# Patient Record
Sex: MALE | Race: WHITE | Employment: OTHER | URBAN - METROPOLITAN AREA
[De-identification: names, ages, dates, MRNs, and addresses within clinical notes are randomized per-mention and may not be internally consistent; named-entity substitution may affect disease eponyms.]

---

## 2016-10-15 LAB
ALBUMIN SERPL-MCNC: 4.5 G/DL
ALP BLD-CCNC: 69 U/L
ALT SERPL-CCNC: 39 U/L
ANION GAP SERPL CALCULATED.3IONS-SCNC: NORMAL MMOL/L
AST SERPL-CCNC: 26 U/L
BASOPHILS ABSOLUTE: NORMAL /ΜL
BASOPHILS RELATIVE PERCENT: NORMAL %
BILIRUB SERPL-MCNC: 0.7 MG/DL (ref 0.1–1.4)
BUN BLDV-MCNC: 17 MG/DL
CALCIUM SERPL-MCNC: 10 MG/DL
CHLORIDE BLD-SCNC: 106 MMOL/L
CHOLESTEROL, TOTAL: 112 MG/DL
CHOLESTEROL/HDL RATIO: NORMAL
CO2: 28 MMOL/L
CREAT SERPL-MCNC: 0.9 MG/DL
EOSINOPHILS ABSOLUTE: NORMAL /ΜL
EOSINOPHILS RELATIVE PERCENT: NORMAL %
GFR CALCULATED: 89
GLUCOSE BLD-MCNC: 94 MG/DL
HCT VFR BLD CALC: 48.6 % (ref 41–53)
HDLC SERPL-MCNC: 44 MG/DL (ref 35–70)
HEMOGLOBIN: 16.5 G/DL (ref 13.5–17.5)
LDL CHOLESTEROL CALCULATED: 51 MG/DL (ref 0–160)
LYMPHOCYTES ABSOLUTE: NORMAL /ΜL
LYMPHOCYTES RELATIVE PERCENT: NORMAL %
MCH RBC QN AUTO: NORMAL PG
MCHC RBC AUTO-ENTMCNC: NORMAL G/DL
MCV RBC AUTO: NORMAL FL
MONOCYTES ABSOLUTE: NORMAL /ΜL
MONOCYTES RELATIVE PERCENT: NORMAL %
NEUTROPHILS ABSOLUTE: NORMAL /ΜL
NEUTROPHILS RELATIVE PERCENT: NORMAL %
PLATELET # BLD: 173 K/ΜL
PMV BLD AUTO: NORMAL FL
POTASSIUM SERPL-SCNC: 4.3 MMOL/L
RBC # BLD: 5.58 10^6/ΜL
SODIUM BLD-SCNC: 144 MMOL/L
TOTAL PROTEIN: 7.2
TRIGL SERPL-MCNC: 84 MG/DL
TSH SERPL DL<=0.05 MIU/L-ACNC: 2.3 UIU/ML
VLDLC SERPL CALC-MCNC: NORMAL MG/DL
WBC # BLD: 5.2 10^3/ML

## 2017-01-17 RX ORDER — EZETIMIBE 10 MG/1
10 TABLET ORAL DAILY
Qty: 30 TABLET | Refills: 11 | Status: SHIPPED | OUTPATIENT
Start: 2017-01-17 | End: 2017-10-27 | Stop reason: SDUPTHER

## 2017-01-23 ENCOUNTER — HOSPITAL ENCOUNTER (OUTPATIENT)
Dept: GENERAL RADIOLOGY | Age: 67
Discharge: HOME | End: 2017-01-23
Attending: SPECIALIST | Admitting: SPECIALIST

## 2017-01-23 LAB — HBV SURFACE AB TITR SER: <3.5 {TITER}

## 2017-01-31 ENCOUNTER — HOSPITAL ENCOUNTER (OUTPATIENT)
Dept: GENERAL RADIOLOGY | Age: 67
Discharge: HOME | End: 2017-01-31
Attending: SPECIALIST | Admitting: SPECIALIST

## 2017-02-03 LAB
HBV DNA ULTRA QNT INTERP REALT: NOT DETECTED
HBV DNA ULTRA QNT REALTIME PCR: <1.3
HBV DNA ULTRA QNT REALTIME PCR: <20

## 2017-03-14 ENCOUNTER — HOSPITAL ENCOUNTER (OUTPATIENT)
Dept: SPEECH THERAPY | Age: 67
Discharge: HOME | End: 2017-03-31
Attending: OTOLARYNGOLOGY | Admitting: OTOLARYNGOLOGY

## 2017-04-01 ENCOUNTER — HOSPITAL ENCOUNTER (OUTPATIENT)
Dept: OTHER | Age: 67
Discharge: HOME | End: 2017-04-30
Attending: OTOLARYNGOLOGY | Admitting: OTOLARYNGOLOGY

## 2017-05-09 ENCOUNTER — OFFICE VISIT (OUTPATIENT)
Dept: CARDIOLOGY CLINIC | Age: 67
End: 2017-05-09

## 2017-05-09 VITALS
HEIGHT: 68 IN | BODY MASS INDEX: 30.25 KG/M2 | WEIGHT: 199.6 LBS | DIASTOLIC BLOOD PRESSURE: 68 MMHG | SYSTOLIC BLOOD PRESSURE: 126 MMHG | HEART RATE: 72 BPM

## 2017-05-09 DIAGNOSIS — I25.10 CORONARY ARTERY DISEASE INVOLVING NATIVE CORONARY ARTERY OF NATIVE HEART WITHOUT ANGINA PECTORIS: Primary | ICD-10-CM

## 2017-05-09 DIAGNOSIS — I10 ESSENTIAL HYPERTENSION: ICD-10-CM

## 2017-05-09 DIAGNOSIS — Z82.49 FAMILY HISTORY OF CORONARY ARTERY DISEASE: ICD-10-CM

## 2017-05-09 DIAGNOSIS — K21.9 GASTROESOPHAGEAL REFLUX DISEASE WITHOUT ESOPHAGITIS: ICD-10-CM

## 2017-05-09 DIAGNOSIS — E78.5 HYPERLIPIDEMIA, UNSPECIFIED HYPERLIPIDEMIA TYPE: ICD-10-CM

## 2017-05-09 DIAGNOSIS — Z95.1 S/P CABG X 1: ICD-10-CM

## 2017-05-09 PROCEDURE — G8598 ASA/ANTIPLAT THER USED: HCPCS | Performed by: INTERNAL MEDICINE

## 2017-05-09 PROCEDURE — 3017F COLORECTAL CA SCREEN DOC REV: CPT | Performed by: INTERNAL MEDICINE

## 2017-05-09 PROCEDURE — 1123F ACP DISCUSS/DSCN MKR DOCD: CPT | Performed by: INTERNAL MEDICINE

## 2017-05-09 PROCEDURE — 1036F TOBACCO NON-USER: CPT | Performed by: INTERNAL MEDICINE

## 2017-05-09 PROCEDURE — 99213 OFFICE O/P EST LOW 20 MIN: CPT | Performed by: INTERNAL MEDICINE

## 2017-05-09 PROCEDURE — G8427 DOCREV CUR MEDS BY ELIG CLIN: HCPCS | Performed by: INTERNAL MEDICINE

## 2017-05-09 PROCEDURE — G8419 CALC BMI OUT NRM PARAM NOF/U: HCPCS | Performed by: INTERNAL MEDICINE

## 2017-05-09 PROCEDURE — 4040F PNEUMOC VAC/ADMIN/RCVD: CPT | Performed by: INTERNAL MEDICINE

## 2017-05-09 RX ORDER — ATORVASTATIN CALCIUM 40 MG/1
40 TABLET, FILM COATED ORAL DAILY
Qty: 30 TABLET | Refills: 11 | Status: SHIPPED | OUTPATIENT
Start: 2017-05-09 | End: 2017-06-29 | Stop reason: SDUPTHER

## 2017-05-09 RX ORDER — FERROUS SULFATE 325(65) MG
325 TABLET ORAL
COMMUNITY
End: 2019-03-14

## 2017-05-09 RX ORDER — AMLODIPINE BESYLATE 5 MG/1
5 TABLET ORAL DAILY
Qty: 30 TABLET | Refills: 11 | Status: SHIPPED | OUTPATIENT
Start: 2017-05-09 | End: 2019-01-23

## 2017-06-29 RX ORDER — ATORVASTATIN CALCIUM 40 MG/1
TABLET, FILM COATED ORAL
Qty: 30 TABLET | Refills: 5 | Status: SHIPPED | OUTPATIENT
Start: 2017-06-29 | End: 2018-01-09 | Stop reason: SDUPTHER

## 2017-10-27 ENCOUNTER — OFFICE VISIT (OUTPATIENT)
Dept: CARDIOLOGY CLINIC | Age: 67
End: 2017-10-27

## 2017-10-27 VITALS — WEIGHT: 191.8 LBS | BODY MASS INDEX: 29.07 KG/M2 | HEIGHT: 68 IN

## 2017-10-27 DIAGNOSIS — E78.5 HYPERLIPIDEMIA, UNSPECIFIED HYPERLIPIDEMIA TYPE: ICD-10-CM

## 2017-10-27 DIAGNOSIS — Z82.49 FAMILY HISTORY OF CORONARY ARTERY DISEASE: ICD-10-CM

## 2017-10-27 DIAGNOSIS — I10 ESSENTIAL HYPERTENSION: ICD-10-CM

## 2017-10-27 DIAGNOSIS — I25.10 CORONARY ARTERY DISEASE INVOLVING NATIVE CORONARY ARTERY OF NATIVE HEART WITHOUT ANGINA PECTORIS: Primary | ICD-10-CM

## 2017-10-27 DIAGNOSIS — K21.9 GASTROESOPHAGEAL REFLUX DISEASE WITHOUT ESOPHAGITIS: ICD-10-CM

## 2017-10-27 DIAGNOSIS — Z95.1 S/P CABG X 1: ICD-10-CM

## 2017-10-27 PROCEDURE — G8427 DOCREV CUR MEDS BY ELIG CLIN: HCPCS | Performed by: INTERNAL MEDICINE

## 2017-10-27 PROCEDURE — G8417 CALC BMI ABV UP PARAM F/U: HCPCS | Performed by: INTERNAL MEDICINE

## 2017-10-27 PROCEDURE — G8484 FLU IMMUNIZE NO ADMIN: HCPCS | Performed by: INTERNAL MEDICINE

## 2017-10-27 PROCEDURE — 3017F COLORECTAL CA SCREEN DOC REV: CPT | Performed by: INTERNAL MEDICINE

## 2017-10-27 PROCEDURE — 1123F ACP DISCUSS/DSCN MKR DOCD: CPT | Performed by: INTERNAL MEDICINE

## 2017-10-27 PROCEDURE — 1036F TOBACCO NON-USER: CPT | Performed by: INTERNAL MEDICINE

## 2017-10-27 PROCEDURE — 99214 OFFICE O/P EST MOD 30 MIN: CPT | Performed by: INTERNAL MEDICINE

## 2017-10-27 PROCEDURE — 4040F PNEUMOC VAC/ADMIN/RCVD: CPT | Performed by: INTERNAL MEDICINE

## 2017-10-27 PROCEDURE — G8598 ASA/ANTIPLAT THER USED: HCPCS | Performed by: INTERNAL MEDICINE

## 2017-10-27 RX ORDER — EZETIMIBE 10 MG/1
10 TABLET ORAL DAILY
Qty: 30 TABLET | Refills: 6 | Status: SHIPPED | OUTPATIENT
Start: 2017-10-27 | End: 2018-07-28 | Stop reason: SDUPTHER

## 2017-10-27 NOTE — PATIENT INSTRUCTIONS
CAD:Yes   clinically stable. Patient is on optimal medical regimen ( see medication list above )  -     CORONARY ARTERY DISEASE: Patient is currently  asymptomatic from CAD. - changes in  treatment:   no           - Testing ordered:  no  [facility] [place] [facility] classification: 1  [place] RISK SCORE:  ZEKE RISK SCORE:  HTN:well controlled on current medical regimen, see list above. - changes in  treatment:   no      VHD: No significant VHD noted  DYSLIPIDEMIA: Patient's profile is at / near Goal.yes,                                 HDL is low                                Tolerating current medical regimen wellyes,                                  See most recent Lab values in Labs section above. OTHER RELEVANT DIAGNOSIS:as noted in patient's active problem list: recent Syncope ? Vaso Vagal.  TESTS ORDERED:  Shun Charles. All previously ordered tests reviewed. ARRHYTHMIAS: None known   MEDICATIONS: CPM.Patient to see [id number] [place] f/u in three months. Primary/secondary prevention is the goal by aggressive risk modification, healthy and therapeutic life style changes for cardiovascular risk reduction. Various goals are discussed and multiple questions answered.

## 2017-10-27 NOTE — PROGRESS NOTES
Jana Lyons is a 78 y.o. male who has    CHIEF COMPLAINT AS FOLLOWS:  CHEST PAIN: Patient denies any C/O chest pains at this time. SOB: No C/O SOB at this time. LEG EDEMA: No leg edema   PALPITATIONS: Denies any C/O Palpitations                                   DIZZINESS: No C/O Dizziness                         SYNCOPE:Reportedly passed out on September 17 th, while lifting a [employer]. OTHER:                                     HPI: Patient is here for F/U on his CAD, HTN & Dyslipidemia problems. He does not have any complaints at this time. Hima Weller has the following history recorded in care path:  Patient Active Problem List    Diagnosis Date Noted    Iron deficiency anemia due to chronic blood loss 03/23/2016     Priority: Low    Family history of coronary artery disease      Priority: Low    HTN (hypertension)      Priority: Low    CAD (coronary artery disease)      Priority: Low    Hyperlipidemia      Priority: Low    GERD (gastroesophageal reflux disease)      Priority: Low    S/P CABG x 1      Priority: Low     Current Outpatient Prescriptions   Medication Sig Dispense Refill    atorvastatin (LIPITOR) 40 MG tablet TAKE 1 TABLET BY MOUTH ONE TIME A DAY  30 tablet 5    ferrous sulfate 325 (65 FE) MG tablet Take 325 mg by mouth daily (with breakfast)      amLODIPine (NORVASC) 5 MG tablet Take 1 tablet by mouth daily 30 tablet 11    ezetimibe (ZETIA) 10 MG tablet Take 1 tablet by mouth daily 30 tablet 11    omeprazole (PRILOSEC) 20 MG capsule Take 20 mg by mouth daily.  therapeutic multivitamin-minerals (THERAGRAN-M) tablet Take 1 tablet by mouth daily.  aspirin 81 MG EC tablet Take 81 mg by mouth daily. No current facility-administered medications for this visit. Allergies: Review of patient's allergies indicates no known allergies.   Past Medical History: Negative for headaches, nasal congestion, sinus pain or vertigo  Eyes: Negative for visual disturbance. Endocrine: Negative for polydipsia/polyuria  Respiratory: Negative for shortness of breath  Cardiovascular: Negative for chest pain, dyspnea on exertion, claudication, edema, irregular heartbeat, murmur, palpitations or shortness of breath  Gastrointestinal: Negative for abdominal pain or heartburn  Genito-Urinary: Negative for urinary frequency/urgency  Musculoskeletal: Negative for muscle pain, muscular weakness, negative for pain in arm and leg or swelling in foot and leg  Neurological: Negative for dizziness, headaches, memory loss, numbness/tingling, visual changes, syncope  Dermatological: Negative for rash    Objective:  Ht 5' 8\" (1.727 m)   Wt 191 lb 12.8 oz (87 kg)   BMI 29.16 kg/m²   Wt Readings from Last 3 Encounters:   10/27/17 191 lb 12.8 oz (87 kg)   05/09/17 199 lb 9.6 oz (90.5 kg)   11/16/16 189 lb (85.7 kg)     Body mass index is 29.16 kg/m². GENERAL - Alert, oriented, pleasant, in no apparent distress. EYES: No jaundice, no conjunctival pallor. SKIN: It is warm & dry. No rashes. No Echhymosis    HEENT  No clinically significant abnormalities seen. Neck - Supple. No jugular venous distention noted. No carotid bruits. Cardiovascular  Normal S1 and S2 without obvious murmur or gallop. Extremities - No cyanosis, clubbing, or significant edema. Pulmonary  No respiratory distress. No wheezes or rales. Abdomen  No masses, tenderness, or organomegaly. Musculoskeletal  No significant edema. No joint deformities. No muscle wasting. Neurologic  Cranial nerves II through XII are grossly intact. There were no gross focal neurologic abnormalities.     Lab Review   No results found for: CKTOTAL, CKMB, CKMBINDEX, TROPONINI  BNP:  No results found for: BNP  PT/INR:  No results found for: INR  No results found for: LABA1C  Lab Results   Component Value Date    WBC 5.8 11/06/2014    HCT 40.3 (A) 11/06/2014     11/06/2014     Lab Results   Component Value Date    CHOL 125 10/06/2014    TRIG 105 10/06/2014    HDL 45 10/06/2014    LDLCALC 59 10/06/2014    LDLDIRECT 67 02/08/2012     Lab Results   Component Value Date    ALT 39 10/06/2014    AST 24 10/06/2014     BMP:    Lab Results   Component Value Date     10/06/2014    K 4.1 10/06/2014     10/06/2014    CO2 22 10/06/2014    BUN 14 10/06/2014    CREATININE 0.9 10/06/2014     CMP:   Lab Results   Component Value Date     10/06/2014    K 4.1 10/06/2014     10/06/2014    CO2 22 10/06/2014    BUN 14 10/06/2014    PROT 7.0 02/08/2012     TSH:    Lab Results   Component Value Date    TSH 2.4 08/16/2013       QUALITY MEASURES REVIEWED:  1.CAD:Patient is taking anti platelet agent:Yes  2. DYSLIPIDEMIA: Patient is on cholesterol lowering medication:Yes  3. Beta-Blocker therapy for CAD, if prior Myocardial Infarction:No  4. Atrial fibrillation & anticoagulation therapy No  5. Discussed weight management strategies. Impression:    1. Coronary artery disease involving native coronary artery of native heart without angina pectoris    2. S/P CABG x 1    3. Hyperlipidemia, unspecified hyperlipidemia type    4. Essential hypertension    5. Gastroesophageal reflux disease without esophagitis    6. Family history of coronary artery disease       Patient Active Problem List   Diagnosis Code    CAD (coronary artery disease) I25.10    Hyperlipidemia E78.5    GERD (gastroesophageal reflux disease) K21.9    S/P CABG x 1 Z95.1    HTN (hypertension) I10    Family history of coronary artery disease Z80.55    Iron deficiency anemia due to chronic blood loss D50.0       Assessment & Plan:    CAD:Yes   clinically stable. Patient is on optimal medical regimen ( see medication list above )  -     CORONARY ARTERY DISEASE: Patient is currently  asymptomatic from CAD.             - changes in  treatment:   no           - Testing ordered: no  [place] classification: 1  [place] RISK SCORE:  ZEKE RISK SCORE:  HTN:well controlled on current medical regimen, see list above. - changes in  treatment:   no      VHD: No significant VHD noted  DYSLIPIDEMIA: Patient's profile is at / near Goal.yes,                                 HDL is low                                Tolerating current medical regimen wellyes,                                  See most recent Lab values in Labs section above. OTHER RELEVANT DIAGNOSIS:as noted in patient's active problem list: recent Syncope ? Vaso Vagal.  TESTS ORDERED:  Clementina Hoskins. All previously ordered tests reviewed. ARRHYTHMIAS: None known   MEDICATIONS: CPM.Patient to see [id number] [place] f/u in three months. Primary/secondary prevention is the goal by aggressive risk modification, healthy and therapeutic life style changes for cardiovascular risk reduction. Various goals are discussed and multiple questions answered.

## 2017-10-31 ENCOUNTER — PROCEDURE VISIT (OUTPATIENT)
Dept: CARDIOLOGY CLINIC | Age: 67
End: 2017-10-31

## 2017-10-31 DIAGNOSIS — I25.10 CORONARY ARTERY DISEASE INVOLVING NATIVE CORONARY ARTERY OF NATIVE HEART WITHOUT ANGINA PECTORIS: ICD-10-CM

## 2017-10-31 DIAGNOSIS — K21.9 GASTROESOPHAGEAL REFLUX DISEASE WITHOUT ESOPHAGITIS: ICD-10-CM

## 2017-10-31 DIAGNOSIS — Z82.49 FAMILY HISTORY OF CORONARY ARTERY DISEASE: ICD-10-CM

## 2017-10-31 DIAGNOSIS — Z95.1 S/P CABG X 1: ICD-10-CM

## 2017-10-31 DIAGNOSIS — I10 ESSENTIAL HYPERTENSION: ICD-10-CM

## 2017-10-31 DIAGNOSIS — R55 SYNCOPE AND COLLAPSE: Primary | ICD-10-CM

## 2017-10-31 DIAGNOSIS — E78.5 HYPERLIPIDEMIA, UNSPECIFIED HYPERLIPIDEMIA TYPE: ICD-10-CM

## 2017-10-31 LAB
LV EF: 65 %
LVEF MODALITY: NORMAL

## 2017-10-31 PROCEDURE — 93018 CV STRESS TEST I&R ONLY: CPT | Performed by: INTERNAL MEDICINE

## 2017-10-31 PROCEDURE — 78452 HT MUSCLE IMAGE SPECT MULT: CPT | Performed by: INTERNAL MEDICINE

## 2017-10-31 PROCEDURE — A9500 TC99M SESTAMIBI: HCPCS | Performed by: INTERNAL MEDICINE

## 2017-10-31 PROCEDURE — 93017 CV STRESS TEST TRACING ONLY: CPT | Performed by: INTERNAL MEDICINE

## 2017-10-31 PROCEDURE — 93016 CV STRESS TEST SUPVJ ONLY: CPT | Performed by: INTERNAL MEDICINE

## 2017-10-31 NOTE — PROGRESS NOTES
11:03 AM    10/31/17    Site: antecubital right, condition patent and no redness    # of attempts: 1

## 2017-11-02 ENCOUNTER — TELEPHONE (OUTPATIENT)
Dept: CARDIOLOGY CLINIC | Age: 67
End: 2017-11-02

## 2017-11-07 ENCOUNTER — TELEPHONE (OUTPATIENT)
Dept: CARDIOLOGY CLINIC | Age: 67
End: 2017-11-07

## 2017-11-17 ENCOUNTER — INITIAL CONSULT (OUTPATIENT)
Dept: CARDIOLOGY CLINIC | Age: 67
End: 2017-11-17

## 2017-11-17 ENCOUNTER — NURSE ONLY (OUTPATIENT)
Dept: CARDIOLOGY CLINIC | Age: 67
End: 2017-11-17

## 2017-11-17 DIAGNOSIS — R55 SYNCOPE, UNSPECIFIED SYNCOPE TYPE: Primary | ICD-10-CM

## 2017-11-17 PROCEDURE — G8417 CALC BMI ABV UP PARAM F/U: HCPCS | Performed by: INTERNAL MEDICINE

## 2017-11-17 PROCEDURE — G8598 ASA/ANTIPLAT THER USED: HCPCS | Performed by: INTERNAL MEDICINE

## 2017-11-17 PROCEDURE — 1123F ACP DISCUSS/DSCN MKR DOCD: CPT | Performed by: INTERNAL MEDICINE

## 2017-11-17 PROCEDURE — 3017F COLORECTAL CA SCREEN DOC REV: CPT | Performed by: INTERNAL MEDICINE

## 2017-11-17 PROCEDURE — 99213 OFFICE O/P EST LOW 20 MIN: CPT | Performed by: INTERNAL MEDICINE

## 2017-11-17 PROCEDURE — 93000 ELECTROCARDIOGRAM COMPLETE: CPT | Performed by: INTERNAL MEDICINE

## 2017-11-17 PROCEDURE — 1036F TOBACCO NON-USER: CPT | Performed by: INTERNAL MEDICINE

## 2017-11-17 PROCEDURE — 4040F PNEUMOC VAC/ADMIN/RCVD: CPT | Performed by: INTERNAL MEDICINE

## 2017-11-17 PROCEDURE — G8484 FLU IMMUNIZE NO ADMIN: HCPCS | Performed by: INTERNAL MEDICINE

## 2017-11-17 PROCEDURE — G8427 DOCREV CUR MEDS BY ELIG CLIN: HCPCS | Performed by: INTERNAL MEDICINE

## 2017-11-17 NOTE — PROGRESS NOTES
Electrophysiology Consult Note      Reason for consultation:  Syncope    Chief complaint : Syncope    Referring physician:  Kristina Naidu      Primary care physician: Sheila Lawton MD      History of Present Illness:     Chief Complaint   Patient presents with    Loss of Consciousness     Mo-syncope/stress test 10/31. Patient denies CP, palpitations, dizziness or edema. Does report SOB but he says this is not unusual for him. He states he has only had the one syncope episode he had9/17/17 and has felt fine since. He has never had any previous episodes of syncope either. Past medical history:   Past Medical History:   Diagnosis Date    CAD (coronary artery disease)     Family history of coronary artery disease     H/O cardiac catheterization 03-    ([facility]) Total occ. LAD w/mild disease of LM and RCA.     H/O cardiovascular stress test 02-    (Exercise) EF 58%. Normal. Exercise cap. 11.0 METS.    H/O cardiovascular stress test 05-    (Cardiolite) EF 63%. Good exercise capacity. Hypertensive blood pressure response tp exercise. ETT neg for ischemia or arrhythmia. Cardiolite perfusion imaging reveals Ant. wall soft tissue attenuation artifact and mild ischemia of Inf. wall.  H/O colonoscopy with polypectomy 02-    Polyp in Rectum    H/O Doppler ultrasound 03-    (Carotid) No anatomical abnormalities. Normal    H/O echocardiogram 02- 6/14 EF 50-55%  abn LV diast stage 1 2/10EF 50-55%. Abn. LV Diastolic function Stage 1. Left Atrium borderline Dilated.  H/O gastritis     Years Ago    History of complete ECG 04-    HTN (hypertension)     Hx of cardiovascular stress test 10/28/2015    cardiolite-mild ischemia apical,EF64%    Hx of echocardiogram 6/9/2016    EF 55-60%. LA is mildly dilated. RV is mildly dilated. Mild MR. Mild tricuspid insufficiency. Significant VHD is absent.      Hyperlipidemia     flank pain and hematuria. Musculoskeletal: Positive for arthralgias. Negative for back pain, myalgias and neck stiffness. Skin: Negative for color change and rash. Allergic/Immunologic: Negative for food allergies. Neurological: Positive for syncope. Negative for numbness and headaches. Hematological: Does not bruise/bleed easily. Psychiatric/Behavioral: Negative for agitation, behavioral problems and confusion. Physical Examination:    BP (!) 140/82   Pulse 60   Temp 98 °F (36.7 °C)   Resp 14   Ht 5' 8\" (1.727 m)   Wt 196 lb 12.8 oz (89.3 kg)   SpO2 98%   BMI 29.92 kg/m²    Wt Readings from Last 3 Encounters:   11/17/17 196 lb 12.8 oz (89.3 kg)   10/27/17 191 lb 12.8 oz (87 kg)   05/09/17 199 lb 9.6 oz (90.5 kg)     Body mass index is 29.92 kg/m². Physical Exam   Constitutional: He is oriented to person, place, and time and well-developed, well-nourished, and in no distress. HENT:   Head: Normocephalic and atraumatic. Eyes: Conjunctivae and EOM are normal. Pupils are equal, round, and reactive to light. Right eye exhibits no discharge. Neck: Normal range of motion. No JVD present. No thyromegaly present. Cardiovascular: Normal rate, regular rhythm and normal heart sounds. Exam reveals no friction rub. No murmur heard. Pulmonary/Chest: Effort normal and breath sounds normal. No stridor. No respiratory distress. He has no wheezes. Abdominal: Soft. Bowel sounds are normal. He exhibits no distension. There is no tenderness. Musculoskeletal: Normal range of motion. He exhibits no edema or tenderness. Neurological: He is alert and oriented to person, place, and time. No cranial nerve deficit. Skin: Skin is warm and dry. No rash noted. No erythema.    Psychiatric: Mood and affect normal.       CBC:   Lab Results   Component Value Date    WBC 5.2 10/14/2016    HGB 16.5 10/14/2016    HCT 48.6 10/14/2016     10/14/2016     Lipids:   Lab Results   Component Value

## 2017-12-03 VITALS
HEART RATE: 60 BPM | BODY MASS INDEX: 29.83 KG/M2 | OXYGEN SATURATION: 98 % | SYSTOLIC BLOOD PRESSURE: 140 MMHG | WEIGHT: 196.8 LBS | DIASTOLIC BLOOD PRESSURE: 82 MMHG | HEIGHT: 68 IN | RESPIRATION RATE: 14 BRPM | TEMPERATURE: 98 F

## 2017-12-03 ASSESSMENT — ENCOUNTER SYMPTOMS
CONSTIPATION: 0
VOMITING: 0
SHORTNESS OF BREATH: 1
BACK PAIN: 0
CHEST TIGHTNESS: 0
DIARRHEA: 0
ABDOMINAL PAIN: 0
COUGH: 0
NAUSEA: 0
WHEEZING: 0
BLOOD IN STOOL: 0
EYE PAIN: 0
PHOTOPHOBIA: 0
COLOR CHANGE: 0

## 2017-12-22 ENCOUNTER — OFFICE VISIT (OUTPATIENT)
Dept: CARDIOLOGY CLINIC | Age: 67
End: 2017-12-22

## 2017-12-22 VITALS
BODY MASS INDEX: 30.31 KG/M2 | HEART RATE: 60 BPM | SYSTOLIC BLOOD PRESSURE: 136 MMHG | HEIGHT: 68 IN | WEIGHT: 200 LBS | DIASTOLIC BLOOD PRESSURE: 82 MMHG

## 2017-12-22 DIAGNOSIS — R55 SYNCOPE, UNSPECIFIED SYNCOPE TYPE: Primary | ICD-10-CM

## 2017-12-22 PROCEDURE — G8427 DOCREV CUR MEDS BY ELIG CLIN: HCPCS | Performed by: INTERNAL MEDICINE

## 2017-12-22 PROCEDURE — G8598 ASA/ANTIPLAT THER USED: HCPCS | Performed by: INTERNAL MEDICINE

## 2017-12-22 PROCEDURE — 93228 REMOTE 30 DAY ECG REV/REPORT: CPT | Performed by: INTERNAL MEDICINE

## 2017-12-22 PROCEDURE — G8484 FLU IMMUNIZE NO ADMIN: HCPCS | Performed by: INTERNAL MEDICINE

## 2017-12-22 PROCEDURE — 99212 OFFICE O/P EST SF 10 MIN: CPT | Performed by: INTERNAL MEDICINE

## 2017-12-22 PROCEDURE — G8417 CALC BMI ABV UP PARAM F/U: HCPCS | Performed by: INTERNAL MEDICINE

## 2017-12-22 PROCEDURE — 3017F COLORECTAL CA SCREEN DOC REV: CPT | Performed by: INTERNAL MEDICINE

## 2017-12-22 PROCEDURE — 1036F TOBACCO NON-USER: CPT | Performed by: INTERNAL MEDICINE

## 2017-12-22 PROCEDURE — 4040F PNEUMOC VAC/ADMIN/RCVD: CPT | Performed by: INTERNAL MEDICINE

## 2017-12-22 PROCEDURE — 1123F ACP DISCUSS/DSCN MKR DOCD: CPT | Performed by: INTERNAL MEDICINE

## 2017-12-22 ASSESSMENT — ENCOUNTER SYMPTOMS
VOMITING: 0
EYE PAIN: 0
WHEEZING: 0
BLOOD IN STOOL: 0
COUGH: 0
NAUSEA: 0
BACK PAIN: 0
SHORTNESS OF BREATH: 1
CHEST TIGHTNESS: 0
ABDOMINAL PAIN: 0
COLOR CHANGE: 0
CONSTIPATION: 0
DIARRHEA: 0
PHOTOPHOBIA: 0

## 2017-12-22 NOTE — PATIENT INSTRUCTIONS
Please remember to bring all medication bottles or a medication list with you to your appointment. If you have any questions, please call our office at [phone].

## 2017-12-22 NOTE — PROGRESS NOTES
Electrophysiology FU Note      Reason for consultation:  Syncope    Chief complaint : Syncope    Referring physician:  Nghia Rodriguez      Primary care physician: Danielle Benitez MD      History of Present Illness: This visit ( 12/22/2017)      Chief Complaint   Patient presents with    Loss of Consciousness     Pt is here for 1 month follow up event monitor. Pt denies chest pain, shortness of breath, dizziness, palpitations, and edema. Previous visit:    Chief Complaint   Patient presents with    Loss of Consciousness     Mo-syncope/stress test 10/31. Patient denies CP, palpitations, dizziness or edema. Does report SOB but he says this is not unusual for him. He states he has only had the one syncope episode he had9/17/17 and has felt fine since. He has never had any previous episodes of syncope either. Past medical history:   Past Medical History:   Diagnosis Date    CAD (coronary artery disease)     Family history of coronary artery disease     H/O cardiac catheterization 03-    ([facility]) Total occ. LAD w/mild disease of LM and RCA.     H/O cardiovascular stress test 02-    (Exercise) EF 58%. Normal. Exercise cap. 11.0 METS.    H/O cardiovascular stress test 05-    (Cardiolite) EF 63%. Good exercise capacity. Hypertensive blood pressure response tp exercise. ETT neg for ischemia or arrhythmia. Cardiolite perfusion imaging reveals Ant. wall soft tissue attenuation artifact and mild ischemia of Inf. wall.  H/O colonoscopy with polypectomy 02-    Polyp in Rectum    H/O Doppler ultrasound 03-    (Carotid) No anatomical abnormalities. Normal    H/O echocardiogram 02- 6/14 EF 50-55%  abn LV diast stage 1 2/10EF 50-55%. Abn. LV Diastolic function Stage 1. Left Atrium borderline Dilated.      H/O gastritis     Years Ago    History of complete ECG 04-    HTN (hypertension)     Hx of for chest pain, palpitations and leg swelling. Gastrointestinal: Negative for abdominal pain, blood in stool, constipation, diarrhea, nausea and vomiting. Endocrine: Negative for cold intolerance and heat intolerance. Genitourinary: Negative for dysuria, flank pain and hematuria. Musculoskeletal: Positive for arthralgias. Negative for back pain, myalgias and neck stiffness. Skin: Negative for color change and rash. Allergic/Immunologic: Negative for food allergies. Neurological: Positive for syncope. Negative for numbness and headaches. Hematological: Does not bruise/bleed easily. Psychiatric/Behavioral: Negative for agitation, behavioral problems and confusion. Physical Examination:    /82   Pulse 60   Ht 5' 8\" (1.727 m)   Wt 200 lb (90.7 kg)   BMI 30.41 kg/m²    Wt Readings from Last 3 Encounters:   12/22/17 200 lb (90.7 kg)   11/17/17 196 lb 12.8 oz (89.3 kg)   10/27/17 191 lb 12.8 oz (87 kg)     Body mass index is 30.41 kg/m². Physical Exam   Constitutional: He is oriented to person, place, and time and well-developed, well-nourished, and in no distress. HENT:   Head: Normocephalic and atraumatic. Eyes: Conjunctivae and EOM are normal. Pupils are equal, round, and reactive to light. Right eye exhibits no discharge. Neck: Normal range of motion. No JVD present. No thyromegaly present. Cardiovascular: Normal rate, regular rhythm and normal heart sounds. Exam reveals no friction rub. No murmur heard. Pulmonary/Chest: Effort normal and breath sounds normal. No stridor. No respiratory distress. He has no wheezes. Abdominal: Soft. Bowel sounds are normal. He exhibits no distension. There is no tenderness. Musculoskeletal: Normal range of motion. He exhibits no edema or tenderness. Neurological: He is alert and oriented to person, place, and time. No cranial nerve deficit. Skin: Skin is warm and dry. No rash noted. No erythema.    Psychiatric: Mood and affect normal.       CBC:   Lab Results   Component Value Date    WBC 5.2 10/14/2016    HGB 16.5 10/14/2016    HCT 48.6 10/14/2016     10/14/2016     Lipids:   Lab Results   Component Value Date    CHOL 112 10/14/2016    TRIG 84 10/14/2016    HDL 44 10/14/2016    LDLCALC 51 10/14/2016    LDLDIRECT 67 02/08/2012     PT/INR: No results found for: INR     BMP:    Lab Results   Component Value Date     10/14/2016    K 4.3 10/14/2016     10/14/2016    CO2 28 10/14/2016    BUN 17 10/14/2016     CMP:   Lab Results   Component Value Date    AST 26 10/14/2016    PROT 7.0 02/08/2012    BILITOT 0.7 10/14/2016    ALKPHOS 69 10/14/2016     TSH:    Lab Results   Component Value Date    TSH 2.3 10/14/2016           IMPRESSION / RECOMMENDATIONS:     Encounter Diagnoses   Name Primary?  Syncope, unspecified syncope type Yes   CAD SP CABG  HTN  HLD      No arrhythmia on event monitor  Patient has no symptoms  FU as needed  If recurs then may need to perform loop recorder        Thanks again for allowing me to participate in care of this patient. Please call me if you have any questions. With best regards.       Miriam Moreno MD, 12/22/2017 8:50 AM

## 2018-01-09 RX ORDER — ATORVASTATIN CALCIUM 40 MG/1
40 TABLET, FILM COATED ORAL DAILY
Qty: 30 TABLET | Refills: 6 | Status: SHIPPED | OUTPATIENT
Start: 2018-01-09 | End: 2018-07-17 | Stop reason: SDUPTHER

## 2018-01-16 ENCOUNTER — OFFICE VISIT (OUTPATIENT)
Dept: CARDIOLOGY CLINIC | Age: 68
End: 2018-01-16

## 2018-01-16 VITALS
SYSTOLIC BLOOD PRESSURE: 126 MMHG | HEART RATE: 68 BPM | BODY MASS INDEX: 30.43 KG/M2 | WEIGHT: 200.8 LBS | DIASTOLIC BLOOD PRESSURE: 70 MMHG | HEIGHT: 68 IN

## 2018-01-16 DIAGNOSIS — Z95.1 S/P CABG X 1: ICD-10-CM

## 2018-01-16 DIAGNOSIS — K21.9 GASTROESOPHAGEAL REFLUX DISEASE WITHOUT ESOPHAGITIS: ICD-10-CM

## 2018-01-16 DIAGNOSIS — E78.5 HYPERLIPIDEMIA, UNSPECIFIED HYPERLIPIDEMIA TYPE: ICD-10-CM

## 2018-01-16 DIAGNOSIS — I25.10 CORONARY ARTERY DISEASE INVOLVING NATIVE CORONARY ARTERY OF NATIVE HEART WITHOUT ANGINA PECTORIS: Primary | ICD-10-CM

## 2018-01-16 DIAGNOSIS — D50.0 IRON DEFICIENCY ANEMIA DUE TO CHRONIC BLOOD LOSS: ICD-10-CM

## 2018-01-16 DIAGNOSIS — Z82.49 FAMILY HISTORY OF CORONARY ARTERY DISEASE: ICD-10-CM

## 2018-01-16 DIAGNOSIS — I10 ESSENTIAL HYPERTENSION: ICD-10-CM

## 2018-01-16 PROCEDURE — G8427 DOCREV CUR MEDS BY ELIG CLIN: HCPCS | Performed by: INTERNAL MEDICINE

## 2018-01-16 PROCEDURE — 1036F TOBACCO NON-USER: CPT | Performed by: INTERNAL MEDICINE

## 2018-01-16 PROCEDURE — 99214 OFFICE O/P EST MOD 30 MIN: CPT | Performed by: INTERNAL MEDICINE

## 2018-01-16 PROCEDURE — G8417 CALC BMI ABV UP PARAM F/U: HCPCS | Performed by: INTERNAL MEDICINE

## 2018-01-16 PROCEDURE — 1123F ACP DISCUSS/DSCN MKR DOCD: CPT | Performed by: INTERNAL MEDICINE

## 2018-01-16 PROCEDURE — G8484 FLU IMMUNIZE NO ADMIN: HCPCS | Performed by: INTERNAL MEDICINE

## 2018-01-16 PROCEDURE — G8598 ASA/ANTIPLAT THER USED: HCPCS | Performed by: INTERNAL MEDICINE

## 2018-01-16 PROCEDURE — 3017F COLORECTAL CA SCREEN DOC REV: CPT | Performed by: INTERNAL MEDICINE

## 2018-01-16 PROCEDURE — 4040F PNEUMOC VAC/ADMIN/RCVD: CPT | Performed by: INTERNAL MEDICINE

## 2018-01-16 NOTE — PROGRESS NOTES
Constitutional: Negative for diaphoresis and fatigue  Psychological:Negative for anxiety or depression  HENT: Negative for headaches, nasal congestion, sinus pain or vertigo  Eyes: Negative for visual disturbance. Endocrine: Negative for polydipsia/polyuria  Respiratory: Negative for shortness of breath  Cardiovascular: Negative for chest pain, dyspnea on exertion, claudication, edema, irregular heartbeat, murmur, palpitations or shortness of breath  Gastrointestinal: Negative for abdominal pain or heartburn  Genito-Urinary: Negative for urinary frequency/urgency  Musculoskeletal: Negative for muscle pain, muscular weakness, negative for pain in arm and leg or swelling in foot and leg  Neurological: Negative for dizziness, headaches, memory loss, numbness/tingling, visual changes, syncope  Dermatological: Negative for rash    Objective:  /70   Pulse 68   Ht 5' 8\" (1.727 m)   Wt 200 lb 12.8 oz (91.1 kg)   BMI 30.53 kg/m²   Wt Readings from Last 3 Encounters:   01/16/18 200 lb 12.8 oz (91.1 kg)   12/22/17 200 lb (90.7 kg)   11/17/17 196 lb 12.8 oz (89.3 kg)     Body mass index is 30.53 kg/m². GENERAL - Alert, oriented, pleasant, in no apparent distress. EYES: No jaundice, no conjunctival pallor. SKIN: It is warm & dry. No rashes. No Echhymosis    HEENT  No clinically significant abnormalities seen. Neck - Supple. No jugular venous distention noted. No carotid bruits. Cardiovascular  Normal S1 and S2 without obvious murmur or gallop. Extremities - No cyanosis, clubbing, or significant edema. Pulmonary  No respiratory distress. No wheezes or rales. Abdomen  No masses, tenderness, or organomegaly. Musculoskeletal  No significant edema. No joint deformities. No muscle wasting. Neurologic  Cranial nerves II through XII are grossly intact. There were no gross focal neurologic abnormalities.     Lab Review   No results found for: CKTOTAL, CKMB, CKMBINDEX, TROPONINI  BNP:  No results found for: BNP  PT/INR:  No results found for: INR  No results found for: LABA1C  Lab Results   Component Value Date    WBC 5.2 10/14/2016    HCT 48.6 10/14/2016     10/14/2016     Lab Results   Component Value Date    CHOL 112 10/14/2016    TRIG 84 10/14/2016    HDL 44 10/14/2016    LDLCALC 51 10/14/2016    LDLDIRECT 67 02/08/2012     Lab Results   Component Value Date    ALT 39 10/14/2016    AST 26 10/14/2016     BMP:    Lab Results   Component Value Date     10/14/2016    K 4.3 10/14/2016     10/14/2016    CO2 28 10/14/2016    BUN 17 10/14/2016    CREATININE 0.9 10/14/2016     CMP:   Lab Results   Component Value Date     10/14/2016    K 4.3 10/14/2016     10/14/2016    CO2 28 10/14/2016    BUN 17 10/14/2016    PROT 7.0 02/08/2012     TSH:    Lab Results   Component Value Date    TSH 2.3 10/14/2016       QUALITY MEASURES REVIEWED:  1.CAD:Patient is taking anti platelet agent:Yes  2. DYSLIPIDEMIA: Patient is on cholesterol lowering medication:Yes  3. Beta-Blocker therapy for CAD, if prior Myocardial Infarction:No  4. Counselled regarding smoking cessation. 5. Atrial fibrillation & anticoagulation therapy No  6. Discussed weight management strategies. Impression:    1. Coronary artery disease involving native coronary artery of native heart without angina pectoris    2. S/P CABG x 1    3. Iron deficiency anemia due to chronic blood loss    4. Hyperlipidemia, unspecified hyperlipidemia type    5. Essential hypertension    6. Gastroesophageal reflux disease without esophagitis    7.  Family history of coronary artery disease       Patient Active Problem List   Diagnosis Code    CAD (coronary artery disease) I25.10    Hyperlipidemia E78.5    GERD (gastroesophageal reflux disease) K21.9    S/P CABG x 1 Z95.1    HTN (hypertension) I10    Family history of coronary artery disease Z80.55    Iron deficiency anemia due to chronic blood loss D50.0       Assessment & Plan:    CAD:Yes clinically stable. Patient is on optimal medical regimen ( see medication list above )  -     CORONARY ARTERY DISEASE: Patient is currently  asymptomatic from CAD. - changes in  treatment:   no           - Testing ordered:  no  [facility] classification: 1  [place] RISK SCORE:  ZEKE RISK SCORE:  HTN:well controlled on current medical regimen, see list above. - changes in  treatment:   no   CARDIOMYOPATHY: None known   CONGESTIVE HEART FAILURE: NO KNOWN HISTORY.     VHD: No significant VHD noted  DYSLIPIDEMIA: Patient's profile is at / near [employer],                                                       Tolerating current medical regimen wellyes,                                                               See most recent Lab values in Labs section above. OTHER RELEVANT DIAGNOSIS:as noted in patient's active problem list:                                     Syncope: appreciate 's input. W/U has been negative. Loop recorder if recurrence. TESTS ORDERED: None this visit                                      All previously ordered tests reviewed. ARRHYTHMIAS: None known   MEDICATIONS: CPM   Office f/u in six months. Primary/secondary prevention is the goal by aggressive risk modification, healthy and therapeutic life style changes for cardiovascular risk reduction. Various goals are discussed and multiple questions answered.

## 2018-02-20 LAB
ALBUMIN SERPL-MCNC: 4.2 G/DL
ALP BLD-CCNC: 72 U/L
ALT SERPL-CCNC: 47 U/L
ANION GAP SERPL CALCULATED.3IONS-SCNC: NORMAL MMOL/L
AST SERPL-CCNC: 29 U/L
BASOPHILS ABSOLUTE: 0 /ΜL
BASOPHILS RELATIVE PERCENT: 0.5 %
BILIRUB SERPL-MCNC: 0.9 MG/DL (ref 0.1–1.4)
BUN BLDV-MCNC: 15 MG/DL
CALCIUM SERPL-MCNC: 9.6 MG/DL
CHLORIDE BLD-SCNC: 104 MMOL/L
CHOLESTEROL, TOTAL: 140 MG/DL
CHOLESTEROL/HDL RATIO: NORMAL
CO2: 26 MMOL/L
CREAT SERPL-MCNC: 0.8 MG/DL
EOSINOPHILS ABSOLUTE: 0.3 /ΜL
EOSINOPHILS RELATIVE PERCENT: 4 %
GFR CALCULATED: NORMAL
GLUCOSE BLD-MCNC: 99 MG/DL
HCT VFR BLD CALC: 49.2 % (ref 41–53)
HDLC SERPL-MCNC: 43 MG/DL (ref 35–70)
HEMOGLOBIN: 17.1 G/DL (ref 13.5–17.5)
LDL CHOLESTEROL CALCULATED: 72 MG/DL (ref 0–160)
LYMPHOCYTES ABSOLUTE: 1.5 /ΜL
LYMPHOCYTES RELATIVE PERCENT: 22.6 %
MCH RBC QN AUTO: 29.8 PG
MCHC RBC AUTO-ENTMCNC: 34.8 G/DL
MCV RBC AUTO: 85.6 FL
MONOCYTES ABSOLUTE: 0.6 /ΜL
MONOCYTES RELATIVE PERCENT: 12 %
NEUTROPHILS ABSOLUTE: 4.1 /ΜL
NEUTROPHILS RELATIVE PERCENT: 60.9 %
PLATELET # BLD: 182 K/ΜL
PMV BLD AUTO: NORMAL FL
POTASSIUM SERPL-SCNC: 4.3 MMOL/L
RBC # BLD: 5.75 10^6/ΜL
SODIUM BLD-SCNC: 144 MMOL/L
TOTAL PROTEIN: 7
TRIGL SERPL-MCNC: 127 MG/DL
VLDLC SERPL CALC-MCNC: 25 MG/DL
WBC # BLD: 6.8 10^3/ML

## 2018-07-17 ENCOUNTER — OFFICE VISIT (OUTPATIENT)
Dept: CARDIOLOGY CLINIC | Age: 68
End: 2018-07-17

## 2018-07-17 VITALS
BODY MASS INDEX: 29.4 KG/M2 | SYSTOLIC BLOOD PRESSURE: 136 MMHG | HEIGHT: 68 IN | WEIGHT: 194 LBS | DIASTOLIC BLOOD PRESSURE: 76 MMHG | HEART RATE: 60 BPM

## 2018-07-17 DIAGNOSIS — E78.5 HYPERLIPIDEMIA, UNSPECIFIED HYPERLIPIDEMIA TYPE: ICD-10-CM

## 2018-07-17 DIAGNOSIS — I25.10 CORONARY ARTERY DISEASE INVOLVING NATIVE CORONARY ARTERY OF NATIVE HEART WITHOUT ANGINA PECTORIS: Primary | ICD-10-CM

## 2018-07-17 DIAGNOSIS — I10 ESSENTIAL HYPERTENSION: ICD-10-CM

## 2018-07-17 DIAGNOSIS — K21.9 GASTROESOPHAGEAL REFLUX DISEASE WITHOUT ESOPHAGITIS: ICD-10-CM

## 2018-07-17 PROCEDURE — 99213 OFFICE O/P EST LOW 20 MIN: CPT | Performed by: NURSE PRACTITIONER

## 2018-07-17 RX ORDER — AMLODIPINE BESYLATE 5 MG/1
5 TABLET ORAL DAILY
COMMUNITY
End: 2018-11-01 | Stop reason: SDUPTHER

## 2018-07-17 RX ORDER — ATORVASTATIN CALCIUM 40 MG/1
40 TABLET, FILM COATED ORAL DAILY
Qty: 90 TABLET | Refills: 3 | Status: SHIPPED | OUTPATIENT
Start: 2018-07-17 | End: 2019-08-21 | Stop reason: SDUPTHER

## 2018-07-17 RX ORDER — OMEPRAZOLE 40 MG/1
CAPSULE, DELAYED RELEASE ORAL
Refills: 6 | COMMUNITY
Start: 2018-06-19 | End: 2019-08-21

## 2018-07-17 NOTE — PROGRESS NOTES
Mulu Neves is a 75 y.o. male who has    CHIEF COMPLAINT AS FOLLOWS:  CHEST PAIN: Patient denies any C/O chest pains at this time. SOB: No C/O SOB at this time. LEG EDEMA: No leg edema   PALPITATIONS: Denies any C/O Palpitations   DIZZINESS: No C/O Dizziness   SYNCOPE: None   OTHER: GERD- stable                                    HPI: Patient is here for F/U on his CAD, HTN & Dyslipidemia problems. He does not have any complaints at this time. Alice Walsh has the following history recorded in care path:  Patient Active Problem List    Diagnosis Date Noted    Iron deficiency anemia due to chronic blood loss 03/23/2016    Family history of coronary artery disease     HTN (hypertension)     CAD (coronary artery disease)     Hyperlipidemia     GERD (gastroesophageal reflux disease)     S/P CABG x 1      Current Outpatient Prescriptions   Medication Sig Dispense Refill    amLODIPine (NORVASC) 5 MG tablet Take 5 mg by mouth daily      atorvastatin (LIPITOR) 40 MG tablet Take 1 tablet by mouth daily 90 tablet 3    ezetimibe (ZETIA) 10 MG tablet Take 1 tablet by mouth daily 30 tablet 6    aspirin 81 MG EC tablet Take 81 mg by mouth daily.  omeprazole (PRILOSEC) 40 MG delayed release capsule TAKE ONE CAPSULE BY MOUTH EVERY DAY FOLLOW WITH A MEAL OR SNACK 45 TO 60 MINS LATER  6    ferrous sulfate 325 (65 FE) MG tablet Take 325 mg by mouth daily (with breakfast)      amLODIPine (NORVASC) 5 MG tablet Take 1 tablet by mouth daily 30 tablet 11    therapeutic multivitamin-minerals (THERAGRAN-M) tablet Take 1 tablet by mouth daily. No current facility-administered medications for this visit. Allergies: Patient has no known allergies.   Past Medical History:   Diagnosis Date    CAD (coronary artery disease)     Family history of coronary artery disease fatigue  Psychological:Negative for anxiety or depression  HENT: Negative for headaches, nasal congestion, sinus pain or vertigo  Eyes: Negative for visual disturbance. Endocrine: Negative for polydipsia/polyuria  Respiratory: Negative for shortness of breath  Cardiovascular: Negative for chest pain, dyspnea on exertion, claudication, edema, irregular heartbeat, murmur, palpitations or shortness of breath  Gastrointestinal: Negative for abdominal pain or heartburn  Genito-Urinary: Negative for urinary frequency/urgency  Musculoskeletal: Negative for muscle pain, muscular weakness, negative for pain in arm and leg or swelling in foot and leg  Neurological: Negative for dizziness, headaches, memory loss, numbness/tingling, visual changes, syncope  Dermatological: Negative for rash    Objective:  /76   Pulse 60   Ht 5' 8\" (1.727 m)   Wt 194 lb (88 kg)   BMI 29.50 kg/m²   Wt Readings from Last 3 Encounters:   07/17/18 194 lb (88 kg)   01/16/18 200 lb 12.8 oz (91.1 kg)   12/22/17 200 lb (90.7 kg)     Body mass index is 29.5 kg/m². GENERAL - Alert, oriented, pleasant, in no apparent distress. EYES: No jaundice, no conjunctival pallor. SKIN: It is warm & dry. No rashes. No Echhymosis    HEENT  No clinically significant abnormalities seen. Neck - Supple. No jugular venous distention noted. No carotid bruits. Cardiovascular  Normal S1 and S2 without obvious murmur or gallop. Extremities - No cyanosis, clubbing, or significant edema. Pulmonary  No respiratory distress. No wheezes or rales. Abdomen  No masses, tenderness, or organomegaly. Musculoskeletal  No significant edema. No joint deformities. No muscle wasting. Neurologic  Cranial nerves II through XII are grossly intact. There were no gross focal neurologic abnormalities.     Lab Review   No results found for: CKTOTAL, CKMB, CKMBINDEX, TROPONINT  BNP:  No results found for: BNP  PT/INR:  No results found for: INR  No results found for: LABA1C  Lab Results   Component Value Date    WBC 5.2 10/14/2016    HCT 48.6 10/14/2016     10/14/2016     Lab Results   Component Value Date    CHOL 112 10/14/2016    TRIG 84 10/14/2016    HDL 44 10/14/2016    LDLCALC 51 10/14/2016    LDLDIRECT 67 02/08/2012     Lab Results   Component Value Date    ALT 39 10/14/2016    AST 26 10/14/2016     BMP:    Lab Results   Component Value Date     10/14/2016    K 4.3 10/14/2016     10/14/2016    CO2 28 10/14/2016    BUN 17 10/14/2016    CREATININE 0.9 10/14/2016     CMP:   Lab Results   Component Value Date     10/14/2016    K 4.3 10/14/2016     10/14/2016    CO2 28 10/14/2016    BUN 17 10/14/2016    PROT 7.0 02/08/2012     TSH:    Lab Results   Component Value Date    TSH 2.3 10/14/2016       QUALITY MEASURES REVIEWED:  1.CAD:Patient is taking anti platelet agent:yes  2. DYSLIPIDEMIA: Patient is on cholesterol lowering medication:yes  3. Beta-Blocker therapy for CAD, if prior Myocardial Infarction:N/A  4. Non smoker  5. Atrial fibrillation & anticoagulation therapy N/A    Impression:    1. Coronary artery disease involving native coronary artery of native heart without angina pectoris    2. Essential hypertension    3. Gastroesophageal reflux disease without esophagitis    4. Hyperlipidemia, unspecified hyperlipidemia type       Patient Active Problem List   Diagnosis Code    CAD (coronary artery disease) I25.10    Hyperlipidemia E78.5    GERD (gastroesophageal reflux disease) K21.9    S/P CABG x 1 Z95.1    HTN (hypertension) I10    Family history of coronary artery disease Z80.55    Iron deficiency anemia due to chronic blood loss D50.0       Assessment & Plan:    CAD:yes   clinically stable. Patient is on optimal medical regimen ( see medication list above )  -     CORONARY ARTERY DISEASE: Patient is currently  asymptomatic from CAD.             - changes in  treatment:  no           - Testing ordered: no  HTN:well controlled on current medical regimen, see list above. - changes in  treatment:   no   CARDIOMYOPATHY: None known   CONGESTIVE HEART FAILURE: NO KNOWN HISTORY.      VHD: No significant VHD noted  DYSLIPIDEMIA: Patient's profile is at / near Goal.yes                                HDL is low                                Tolerating current medical regimen wellyes                                See most recent Lab values in Labs section above. Diabetes mellitis: .no                                    PAD: None known. claudication symptoms. Adalberto Chatman no                CAROTID ARTERY DISEASE:.no               No symptoms described pertaining to Carotid artery disease               Up to date on non invasive testing . N/A               Patient is on Guidelines recommended therapy. N/A  OTHER RELEVANT DIAGNOSIS: as noted in patient's active problem list:  TESTS ORDERED: None this visit                                            All previously ordered tests reviewed. ARRHYTHMIAS: None known                                 MEDICATIONS: CPM   Office f/u in six months. .    Primary/secondary prevention is the goal by aggressive risk modification, healthy and therapeutic life style changes for cardiovascular risk reduction. Various goals are discussed and multiple questions answered.

## 2018-07-31 RX ORDER — EZETIMIBE 10 MG/1
10 TABLET ORAL DAILY
Qty: 30 TABLET | Refills: 6 | Status: SHIPPED | OUTPATIENT
Start: 2018-07-31 | End: 2019-02-17 | Stop reason: SDUPTHER

## 2018-08-22 LAB
ALBUMIN SERPL-MCNC: 4.4 G/DL
ALP BLD-CCNC: 68 U/L
ALT SERPL-CCNC: 36 U/L
ANION GAP SERPL CALCULATED.3IONS-SCNC: NORMAL MMOL/L
AST SERPL-CCNC: 27 U/L
BASOPHILS ABSOLUTE: 0 /ΜL
BASOPHILS RELATIVE PERCENT: 0.5 %
BILIRUB SERPL-MCNC: 0.7 MG/DL (ref 0.1–1.4)
BUN BLDV-MCNC: 16 MG/DL
CALCIUM SERPL-MCNC: 9.8 MG/DL
CHLORIDE BLD-SCNC: 104 MMOL/L
CHOLESTEROL, TOTAL: 124 MG/DL
CHOLESTEROL/HDL RATIO: NORMAL
CO2: 26 MMOL/L
CREAT SERPL-MCNC: 1 MG/DL
EOSINOPHILS ABSOLUTE: 0.4 /ΜL
EOSINOPHILS RELATIVE PERCENT: 4.6 %
GFR CALCULATED: NORMAL
GLUCOSE BLD-MCNC: 119 MG/DL
HCT VFR BLD CALC: 46.9 % (ref 41–53)
HDLC SERPL-MCNC: 41 MG/DL (ref 35–70)
HEMOGLOBIN: 16.3 G/DL (ref 13.5–17.5)
LDL CHOLESTEROL CALCULATED: 62 MG/DL (ref 0–160)
LYMPHOCYTES ABSOLUTE: 1.8 /ΜL
LYMPHOCYTES RELATIVE PERCENT: 22.1 %
MCH RBC QN AUTO: 29.8 PG
MCHC RBC AUTO-ENTMCNC: 34.8 G/DL
MCV RBC AUTO: 85.5 FL
MONOCYTES ABSOLUTE: 0.7 /ΜL
MONOCYTES RELATIVE PERCENT: 9.2 %
NEUTROPHILS ABSOLUTE: 5.1 /ΜL
NEUTROPHILS RELATIVE PERCENT: 63.6 %
PLATELET # BLD: 196 K/ΜL
PMV BLD AUTO: NORMAL FL
POTASSIUM SERPL-SCNC: 3.7 MMOL/L
RBC # BLD: 5.49 10^6/ΜL
SODIUM BLD-SCNC: 142 MMOL/L
TOTAL PROTEIN: 7.4
TRIGL SERPL-MCNC: 107 MG/DL
VLDLC SERPL CALC-MCNC: 21 MG/DL
WBC # BLD: 8 10^3/ML

## 2018-11-01 RX ORDER — AMLODIPINE BESYLATE 5 MG/1
5 TABLET ORAL DAILY
Qty: 30 TABLET | Refills: 5 | Status: SHIPPED | OUTPATIENT
Start: 2018-11-01 | End: 2019-04-25 | Stop reason: SDUPTHER

## 2019-01-23 ENCOUNTER — OFFICE VISIT (OUTPATIENT)
Dept: CARDIOLOGY CLINIC | Age: 69
End: 2019-01-23
Payer: MEDICARE

## 2019-01-23 VITALS
SYSTOLIC BLOOD PRESSURE: 110 MMHG | WEIGHT: 200 LBS | HEART RATE: 64 BPM | DIASTOLIC BLOOD PRESSURE: 70 MMHG | HEIGHT: 68 IN | BODY MASS INDEX: 30.31 KG/M2

## 2019-01-23 DIAGNOSIS — Z82.49 FAMILY HISTORY OF CORONARY ARTERY DISEASE: ICD-10-CM

## 2019-01-23 DIAGNOSIS — I25.10 CORONARY ARTERY DISEASE INVOLVING NATIVE CORONARY ARTERY OF NATIVE HEART WITHOUT ANGINA PECTORIS: Primary | ICD-10-CM

## 2019-01-23 DIAGNOSIS — I10 ESSENTIAL HYPERTENSION: ICD-10-CM

## 2019-01-23 DIAGNOSIS — E78.5 HYPERLIPIDEMIA, UNSPECIFIED HYPERLIPIDEMIA TYPE: ICD-10-CM

## 2019-01-23 DIAGNOSIS — Z95.1 S/P CABG X 1: ICD-10-CM

## 2019-01-23 DIAGNOSIS — K21.9 GASTROESOPHAGEAL REFLUX DISEASE WITHOUT ESOPHAGITIS: ICD-10-CM

## 2019-01-23 PROCEDURE — 3017F COLORECTAL CA SCREEN DOC REV: CPT | Performed by: INTERNAL MEDICINE

## 2019-01-23 PROCEDURE — G8484 FLU IMMUNIZE NO ADMIN: HCPCS | Performed by: INTERNAL MEDICINE

## 2019-01-23 PROCEDURE — G8598 ASA/ANTIPLAT THER USED: HCPCS | Performed by: INTERNAL MEDICINE

## 2019-01-23 PROCEDURE — G8417 CALC BMI ABV UP PARAM F/U: HCPCS | Performed by: INTERNAL MEDICINE

## 2019-01-23 PROCEDURE — 1036F TOBACCO NON-USER: CPT | Performed by: INTERNAL MEDICINE

## 2019-01-23 PROCEDURE — 1123F ACP DISCUSS/DSCN MKR DOCD: CPT | Performed by: INTERNAL MEDICINE

## 2019-01-23 PROCEDURE — 1101F PT FALLS ASSESS-DOCD LE1/YR: CPT | Performed by: INTERNAL MEDICINE

## 2019-01-23 PROCEDURE — G8427 DOCREV CUR MEDS BY ELIG CLIN: HCPCS | Performed by: INTERNAL MEDICINE

## 2019-01-23 PROCEDURE — 99213 OFFICE O/P EST LOW 20 MIN: CPT | Performed by: INTERNAL MEDICINE

## 2019-01-23 PROCEDURE — 4040F PNEUMOC VAC/ADMIN/RCVD: CPT | Performed by: INTERNAL MEDICINE

## 2019-02-19 RX ORDER — EZETIMIBE 10 MG/1
10 TABLET ORAL DAILY
Qty: 30 TABLET | Refills: 6 | Status: SHIPPED | OUTPATIENT
Start: 2019-02-19 | End: 2019-12-13 | Stop reason: SDUPTHER

## 2019-03-14 ENCOUNTER — APPOINTMENT (OUTPATIENT)
Dept: GENERAL RADIOLOGY | Age: 69
DRG: 166 | End: 2019-03-14
Payer: MEDICARE

## 2019-03-14 ENCOUNTER — HOSPITAL ENCOUNTER (INPATIENT)
Age: 69
LOS: 7 days | Discharge: HOME | DRG: 166 | End: 2019-03-21
Attending: EMERGENCY MEDICINE | Admitting: HOSPITALIST
Payer: MEDICARE

## 2019-03-14 ENCOUNTER — APPOINTMENT (OUTPATIENT)
Dept: CT IMAGING | Age: 69
DRG: 166 | End: 2019-03-14
Payer: MEDICARE

## 2019-03-14 DIAGNOSIS — R06.00 DYSPNEA, UNSPECIFIED TYPE: Primary | ICD-10-CM

## 2019-03-14 DIAGNOSIS — J18.9 PNEUMONIA DUE TO ORGANISM: ICD-10-CM

## 2019-03-14 DIAGNOSIS — D72.829 LEUKOCYTOSIS, UNSPECIFIED TYPE: ICD-10-CM

## 2019-03-14 DIAGNOSIS — R09.02 HYPOXEMIA: ICD-10-CM

## 2019-03-14 PROBLEM — J96.01 ACUTE RESPIRATORY FAILURE WITH HYPOXIA (HCC): Status: ACTIVE | Noted: 2019-03-14

## 2019-03-14 PROBLEM — N39.0 UTI (URINARY TRACT INFECTION): Status: ACTIVE | Noted: 2019-03-14

## 2019-03-14 LAB
ALBUMIN SERPL-MCNC: 3.4 GM/DL (ref 3.4–5)
ALP BLD-CCNC: 73 IU/L (ref 40–129)
ALT SERPL-CCNC: 17 U/L (ref 10–40)
ANION GAP SERPL CALCULATED.3IONS-SCNC: 13 MMOL/L (ref 4–16)
AST SERPL-CCNC: 19 IU/L (ref 15–37)
BACTERIA: ABNORMAL /HPF
BASE EXCESS: ABNORMAL (ref 0–3.3)
BASOPHILS ABSOLUTE: 0 K/CU MM
BASOPHILS RELATIVE PERCENT: 0.1 % (ref 0–1)
BILIRUB SERPL-MCNC: 2 MG/DL (ref 0–1)
BILIRUBIN URINE: NEGATIVE MG/DL
BLOOD, URINE: ABNORMAL
BUN BLDV-MCNC: 36 MG/DL (ref 6–23)
CALCIUM SERPL-MCNC: 8.6 MG/DL (ref 8.3–10.6)
CHLORIDE BLD-SCNC: 102 MMOL/L (ref 99–110)
CLARITY: ABNORMAL
CO2: 19 MMOL/L (ref 21–32)
COLOR: ABNORMAL
COMMENT: ABNORMAL
CREAT SERPL-MCNC: 1.1 MG/DL (ref 0.9–1.3)
DIFFERENTIAL TYPE: ABNORMAL
EOSINOPHILS ABSOLUTE: 0 K/CU MM
EOSINOPHILS RELATIVE PERCENT: 0 % (ref 0–3)
GFR AFRICAN AMERICAN: >60 ML/MIN/1.73M2
GFR NON-AFRICAN AMERICAN: >60 ML/MIN/1.73M2
GLUCOSE BLD-MCNC: 136 MG/DL (ref 70–99)
GLUCOSE, URINE: 50 MG/DL
HCO3 VENOUS: 23.1 MMOL/L (ref 19–25)
HCT VFR BLD CALC: 47.5 % (ref 42–52)
HEMOGLOBIN: 15.7 GM/DL (ref 13.5–18)
IMMATURE NEUTROPHIL %: 1 % (ref 0–0.43)
KETONES, URINE: NEGATIVE MG/DL
LACTATE: 1.5 MMOL/L (ref 0.4–2)
LEUKOCYTE ESTERASE, URINE: ABNORMAL
LYMPHOCYTES ABSOLUTE: 0.9 K/CU MM
LYMPHOCYTES RELATIVE PERCENT: 4.4 % (ref 24–44)
MCH RBC QN AUTO: 29 PG (ref 27–31)
MCHC RBC AUTO-ENTMCNC: 33.1 % (ref 32–36)
MCV RBC AUTO: 87.8 FL (ref 78–100)
MONOCYTES ABSOLUTE: 1.6 K/CU MM
MONOCYTES RELATIVE PERCENT: 8 % (ref 0–4)
MUCUS: ABNORMAL HPF
NITRITE URINE, QUANTITATIVE: POSITIVE
NUCLEATED RBC %: 0 %
O2 SAT, VEN: 88.7 % (ref 50–70)
PCO2, VEN: 34 MMHG (ref 38–52)
PDW BLD-RTO: 12.7 % (ref 11.7–14.9)
PH VENOUS: 7.44 (ref 7.32–7.42)
PH, URINE: 5 (ref 5–8)
PLATELET # BLD: 151 K/CU MM (ref 140–440)
PMV BLD AUTO: 10.6 FL (ref 7.5–11.1)
PO2, VEN: 51 MMHG (ref 28–48)
POTASSIUM SERPL-SCNC: 3.9 MMOL/L (ref 3.5–5.1)
PRO-BNP: 632.8 PG/ML
PROTEIN UA: 100 MG/DL
RAPID INFLUENZA  B AGN: NEGATIVE
RAPID INFLUENZA A AGN: NEGATIVE
RBC # BLD: 5.41 M/CU MM (ref 4.6–6.2)
RBC URINE: 10 /HPF (ref 0–3)
SEGMENTED NEUTROPHILS ABSOLUTE COUNT: 17.4 K/CU MM
SEGMENTED NEUTROPHILS RELATIVE PERCENT: 86.5 % (ref 36–66)
SODIUM BLD-SCNC: 134 MMOL/L (ref 135–145)
SPECIFIC GRAVITY UA: 1.03 (ref 1–1.03)
TOTAL CK: 310 IU/L (ref 38–174)
TOTAL IMMATURE NEUTOROPHIL: 0.2 K/CU MM
TOTAL NUCLEATED RBC: 0 K/CU MM
TOTAL PROTEIN: 7.3 GM/DL (ref 6.4–8.2)
TRANSITIONAL EPITHELIAL: <1 /HPF
TRICHOMONAS: ABNORMAL /HPF
TROPONIN T: <0.01 NG/ML
TSH HIGH SENSITIVITY: 3.95 UIU/ML (ref 0.27–4.2)
UROBILINOGEN, URINE: NORMAL MG/DL (ref 0.2–1)
WBC # BLD: 20.1 K/CU MM (ref 4–10.5)
WBC CLUMP: ABNORMAL /HPF
WBC UA: 686 /HPF (ref 0–2)

## 2019-03-14 PROCEDURE — 81001 URINALYSIS AUTO W/SCOPE: CPT

## 2019-03-14 PROCEDURE — 71045 X-RAY EXAM CHEST 1 VIEW: CPT

## 2019-03-14 PROCEDURE — 83605 ASSAY OF LACTIC ACID: CPT

## 2019-03-14 PROCEDURE — 2580000003 HC RX 258: Performed by: EMERGENCY MEDICINE

## 2019-03-14 PROCEDURE — 2580000003 HC RX 258: Performed by: NURSE PRACTITIONER

## 2019-03-14 PROCEDURE — 99291 CRITICAL CARE FIRST HOUR: CPT

## 2019-03-14 PROCEDURE — 6370000000 HC RX 637 (ALT 250 FOR IP): Performed by: NURSE PRACTITIONER

## 2019-03-14 PROCEDURE — 87040 BLOOD CULTURE FOR BACTERIA: CPT

## 2019-03-14 PROCEDURE — 6370000000 HC RX 637 (ALT 250 FOR IP): Performed by: EMERGENCY MEDICINE

## 2019-03-14 PROCEDURE — 87804 INFLUENZA ASSAY W/OPTIC: CPT

## 2019-03-14 PROCEDURE — 84484 ASSAY OF TROPONIN QUANT: CPT

## 2019-03-14 PROCEDURE — 82805 BLOOD GASES W/O2 SATURATION: CPT

## 2019-03-14 PROCEDURE — 87899 AGENT NOS ASSAY W/OPTIC: CPT

## 2019-03-14 PROCEDURE — 87449 NOS EACH ORGANISM AG IA: CPT

## 2019-03-14 PROCEDURE — 87086 URINE CULTURE/COLONY COUNT: CPT

## 2019-03-14 PROCEDURE — 85025 COMPLETE CBC W/AUTO DIFF WBC: CPT

## 2019-03-14 PROCEDURE — 82550 ASSAY OF CK (CPK): CPT

## 2019-03-14 PROCEDURE — 36415 COLL VENOUS BLD VENIPUNCTURE: CPT

## 2019-03-14 PROCEDURE — 80053 COMPREHEN METABOLIC PANEL: CPT

## 2019-03-14 PROCEDURE — 70450 CT HEAD/BRAIN W/O DYE: CPT

## 2019-03-14 PROCEDURE — 96365 THER/PROPH/DIAG IV INF INIT: CPT

## 2019-03-14 PROCEDURE — 6360000002 HC RX W HCPCS: Performed by: EMERGENCY MEDICINE

## 2019-03-14 PROCEDURE — 94761 N-INVAS EAR/PLS OXIMETRY MLT: CPT

## 2019-03-14 PROCEDURE — 83880 ASSAY OF NATRIURETIC PEPTIDE: CPT

## 2019-03-14 PROCEDURE — 84443 ASSAY THYROID STIM HORMONE: CPT

## 2019-03-14 PROCEDURE — 1200000000 HC SEMI PRIVATE

## 2019-03-14 PROCEDURE — 93005 ELECTROCARDIOGRAM TRACING: CPT | Performed by: EMERGENCY MEDICINE

## 2019-03-14 RX ORDER — IPRATROPIUM BROMIDE AND ALBUTEROL SULFATE 2.5; .5 MG/3ML; MG/3ML
1 SOLUTION RESPIRATORY (INHALATION)
Status: DISCONTINUED | OUTPATIENT
Start: 2019-03-15 | End: 2019-03-18

## 2019-03-14 RX ORDER — 0.9 % SODIUM CHLORIDE 0.9 %
1000 INTRAVENOUS SOLUTION INTRAVENOUS ONCE
Status: COMPLETED | OUTPATIENT
Start: 2019-03-14 | End: 2019-03-14

## 2019-03-14 RX ORDER — ACETAMINOPHEN 325 MG/1
650 TABLET ORAL EVERY 4 HOURS PRN
Status: DISCONTINUED | OUTPATIENT
Start: 2019-03-14 | End: 2019-03-21 | Stop reason: HOSPADM

## 2019-03-14 RX ORDER — AMLODIPINE BESYLATE 5 MG/1
5 TABLET ORAL DAILY
Status: DISCONTINUED | OUTPATIENT
Start: 2019-03-15 | End: 2019-03-21 | Stop reason: HOSPADM

## 2019-03-14 RX ORDER — SODIUM CHLORIDE 0.9 % (FLUSH) 0.9 %
10 SYRINGE (ML) INJECTION EVERY 12 HOURS SCHEDULED
Status: DISCONTINUED | OUTPATIENT
Start: 2019-03-14 | End: 2019-03-21 | Stop reason: HOSPADM

## 2019-03-14 RX ORDER — SODIUM CHLORIDE 9 MG/ML
INJECTION, SOLUTION INTRAVENOUS CONTINUOUS
Status: DISCONTINUED | OUTPATIENT
Start: 2019-03-14 | End: 2019-03-18

## 2019-03-14 RX ORDER — ASPIRIN 81 MG/1
81 TABLET ORAL DAILY
Status: DISCONTINUED | OUTPATIENT
Start: 2019-03-15 | End: 2019-03-21 | Stop reason: HOSPADM

## 2019-03-14 RX ORDER — PRAMIPEXOLE DIHYDROCHLORIDE 0.25 MG/1
0.12 TABLET ORAL NIGHTLY
Status: DISCONTINUED | OUTPATIENT
Start: 2019-03-14 | End: 2019-03-21 | Stop reason: HOSPADM

## 2019-03-14 RX ORDER — ACETAMINOPHEN 500 MG
1000 TABLET ORAL ONCE
Status: COMPLETED | OUTPATIENT
Start: 2019-03-14 | End: 2019-03-14

## 2019-03-14 RX ORDER — PANTOPRAZOLE SODIUM 40 MG/1
40 TABLET, DELAYED RELEASE ORAL
Status: DISCONTINUED | OUTPATIENT
Start: 2019-03-15 | End: 2019-03-21 | Stop reason: HOSPADM

## 2019-03-14 RX ORDER — EZETIMIBE 10 MG/1
10 TABLET ORAL DAILY
Status: DISCONTINUED | OUTPATIENT
Start: 2019-03-15 | End: 2019-03-14 | Stop reason: CLARIF

## 2019-03-14 RX ORDER — ATORVASTATIN CALCIUM 40 MG/1
40 TABLET, FILM COATED ORAL DAILY
Status: DISCONTINUED | OUTPATIENT
Start: 2019-03-15 | End: 2019-03-21 | Stop reason: HOSPADM

## 2019-03-14 RX ORDER — ONDANSETRON 2 MG/ML
4 INJECTION INTRAMUSCULAR; INTRAVENOUS EVERY 6 HOURS PRN
Status: DISCONTINUED | OUTPATIENT
Start: 2019-03-14 | End: 2019-03-21 | Stop reason: HOSPADM

## 2019-03-14 RX ORDER — PRAMIPEXOLE DIHYDROCHLORIDE 0.12 MG/1
0.12 TABLET ORAL NIGHTLY
Refills: 2 | COMMUNITY
Start: 2019-02-20 | End: 2019-05-23 | Stop reason: SDUPTHER

## 2019-03-14 RX ORDER — SODIUM CHLORIDE 0.9 % (FLUSH) 0.9 %
10 SYRINGE (ML) INJECTION PRN
Status: DISCONTINUED | OUTPATIENT
Start: 2019-03-14 | End: 2019-03-21 | Stop reason: HOSPADM

## 2019-03-14 RX ADMIN — SODIUM CHLORIDE 1000 ML: 9 INJECTION, SOLUTION INTRAVENOUS at 21:34

## 2019-03-14 RX ADMIN — SODIUM CHLORIDE: 9 INJECTION, SOLUTION INTRAVENOUS at 22:46

## 2019-03-14 RX ADMIN — ACETAMINOPHEN 1000 MG: 500 TABLET ORAL at 16:36

## 2019-03-14 RX ADMIN — AZITHROMYCIN MONOHYDRATE 500 MG: 500 INJECTION, POWDER, LYOPHILIZED, FOR SOLUTION INTRAVENOUS at 20:07

## 2019-03-14 RX ADMIN — SODIUM CHLORIDE, PRESERVATIVE FREE 10 ML: 5 INJECTION INTRAVENOUS at 22:45

## 2019-03-14 RX ADMIN — PRAMIPEXOLE DIHYDROCHLORIDE 0.12 MG: 0.25 TABLET ORAL at 22:44

## 2019-03-14 RX ADMIN — CEFTRIAXONE SODIUM 1 G: 1 INJECTION, POWDER, FOR SOLUTION INTRAMUSCULAR; INTRAVENOUS at 18:00

## 2019-03-14 RX ADMIN — SODIUM CHLORIDE 1000 ML: 9 INJECTION, SOLUTION INTRAVENOUS at 16:36

## 2019-03-14 ASSESSMENT — PAIN SCALES - GENERAL
PAINLEVEL_OUTOF10: 0

## 2019-03-15 LAB
ADENOVIRUS DETECTION BY PCR: NOT DETECTED
ANION GAP SERPL CALCULATED.3IONS-SCNC: 13 MMOL/L (ref 4–16)
ANISOCYTOSIS: ABNORMAL
BANDED NEUTROPHILS ABSOLUTE COUNT: 2.36 K/CU MM
BANDED NEUTROPHILS RELATIVE PERCENT: 15 % (ref 5–11)
BORDETELLA PERTUSSIS PCR: NOT DETECTED
BUN BLDV-MCNC: 37 MG/DL (ref 6–23)
CALCIUM SERPL-MCNC: 8.4 MG/DL (ref 8.3–10.6)
CHLAMYDOPHILA PNEUMONIA PCR: NOT DETECTED
CHLORIDE BLD-SCNC: 104 MMOL/L (ref 99–110)
CO2: 23 MMOL/L (ref 21–32)
CORONAVIRUS 229E PCR: NOT DETECTED
CORONAVIRUS HKU1 PCR: NOT DETECTED
CORONAVIRUS NL63 PCR: NOT DETECTED
CORONAVIRUS OC43 PCR: NOT DETECTED
CREAT SERPL-MCNC: 1 MG/DL (ref 0.9–1.3)
DIFFERENTIAL TYPE: ABNORMAL
GFR AFRICAN AMERICAN: >60 ML/MIN/1.73M2
GFR NON-AFRICAN AMERICAN: >60 ML/MIN/1.73M2
GLUCOSE BLD-MCNC: 133 MG/DL (ref 70–99)
HCT VFR BLD CALC: 44 % (ref 42–52)
HEMOGLOBIN: 14.4 GM/DL (ref 13.5–18)
HUMAN METAPNEUMOVIRUS PCR: NOT DETECTED
INFLUENZA A BY PCR: NOT DETECTED
INFLUENZA A H1 (2009) PCR: NOT DETECTED
INFLUENZA A H1 PANDEMIC PCR: NOT DETECTED
INFLUENZA A H3 PCR: NOT DETECTED
INFLUENZA B BY PCR: NOT DETECTED
LEGIONELLA URINARY AG: NEGATIVE
LYMPHOCYTES ABSOLUTE: 0.8 K/CU MM
LYMPHOCYTES RELATIVE PERCENT: 5 % (ref 24–44)
MCH RBC QN AUTO: 29.2 PG (ref 27–31)
MCHC RBC AUTO-ENTMCNC: 32.7 % (ref 32–36)
MCV RBC AUTO: 89.2 FL (ref 78–100)
MONOCYTES ABSOLUTE: 1.1 K/CU MM
MONOCYTES RELATIVE PERCENT: 7 % (ref 0–4)
MYCOPLASMA PNEUMONIAE PCR: NOT DETECTED
PARAINFLUENZA 1 PCR: NOT DETECTED
PARAINFLUENZA 2 PCR: NOT DETECTED
PARAINFLUENZA 3 PCR: NOT DETECTED
PARAINFLUENZA 4 PCR: NOT DETECTED
PDW BLD-RTO: 12.7 % (ref 11.7–14.9)
PLATELET # BLD: 136 K/CU MM (ref 140–440)
PMV BLD AUTO: 10.8 FL (ref 7.5–11.1)
POTASSIUM SERPL-SCNC: 4.2 MMOL/L (ref 3.5–5.1)
RBC # BLD: 4.93 M/CU MM (ref 4.6–6.2)
RBC # BLD: ABNORMAL 10*6/UL
RHINOVIRUS ENTEROVIRUS PCR: NOT DETECTED
RSV PCR: NOT DETECTED
SEGMENTED NEUTROPHILS ABSOLUTE COUNT: 11.4 K/CU MM
SEGMENTED NEUTROPHILS RELATIVE PERCENT: 73 % (ref 36–66)
SODIUM BLD-SCNC: 140 MMOL/L (ref 135–145)
STREP PNEUMONIAE ANTIGEN: NORMAL
TOXIC GRANULATION: PRESENT
WBC # BLD: 15.7 K/CU MM (ref 4–10.5)
WBC # BLD: ABNORMAL 10*3/UL

## 2019-03-15 PROCEDURE — 87581 M.PNEUMON DNA AMP PROBE: CPT

## 2019-03-15 PROCEDURE — 94761 N-INVAS EAR/PLS OXIMETRY MLT: CPT

## 2019-03-15 PROCEDURE — 84156 ASSAY OF PROTEIN URINE: CPT

## 2019-03-15 PROCEDURE — 87798 DETECT AGENT NOS DNA AMP: CPT

## 2019-03-15 PROCEDURE — 1200000000 HC SEMI PRIVATE

## 2019-03-15 PROCEDURE — 36415 COLL VENOUS BLD VENIPUNCTURE: CPT

## 2019-03-15 PROCEDURE — 6370000000 HC RX 637 (ALT 250 FOR IP): Performed by: NURSE PRACTITIONER

## 2019-03-15 PROCEDURE — 85007 BL SMEAR W/DIFF WBC COUNT: CPT

## 2019-03-15 PROCEDURE — 94640 AIRWAY INHALATION TREATMENT: CPT

## 2019-03-15 PROCEDURE — 82570 ASSAY OF URINE CREATININE: CPT

## 2019-03-15 PROCEDURE — 94664 DEMO&/EVAL PT USE INHALER: CPT

## 2019-03-15 PROCEDURE — 6360000002 HC RX W HCPCS: Performed by: NURSE PRACTITIONER

## 2019-03-15 PROCEDURE — 87486 CHLMYD PNEUM DNA AMP PROBE: CPT

## 2019-03-15 PROCEDURE — 85027 COMPLETE CBC AUTOMATED: CPT

## 2019-03-15 PROCEDURE — 2580000003 HC RX 258: Performed by: NURSE PRACTITIONER

## 2019-03-15 PROCEDURE — 80048 BASIC METABOLIC PNL TOTAL CA: CPT

## 2019-03-15 RX ADMIN — CEFTRIAXONE 1 G: 1 INJECTION, POWDER, FOR SOLUTION INTRAMUSCULAR; INTRAVENOUS at 09:15

## 2019-03-15 RX ADMIN — AZITHROMYCIN MONOHYDRATE 500 MG: 500 INJECTION, POWDER, LYOPHILIZED, FOR SOLUTION INTRAVENOUS at 10:14

## 2019-03-15 RX ADMIN — IPRATROPIUM BROMIDE AND ALBUTEROL SULFATE 1 AMPULE: .5; 3 SOLUTION RESPIRATORY (INHALATION) at 11:34

## 2019-03-15 RX ADMIN — ENOXAPARIN SODIUM 30 MG: 30 INJECTION SUBCUTANEOUS at 09:15

## 2019-03-15 RX ADMIN — PANTOPRAZOLE SODIUM 40 MG: 40 TABLET, DELAYED RELEASE ORAL at 06:43

## 2019-03-15 RX ADMIN — ASPIRIN 81 MG: 81 TABLET, COATED ORAL at 09:15

## 2019-03-15 RX ADMIN — SODIUM CHLORIDE: 9 INJECTION, SOLUTION INTRAVENOUS at 11:35

## 2019-03-15 RX ADMIN — ATORVASTATIN CALCIUM 40 MG: 40 TABLET, FILM COATED ORAL at 09:15

## 2019-03-15 RX ADMIN — SERTRALINE HYDROCHLORIDE 50 MG: 50 TABLET ORAL at 09:15

## 2019-03-15 RX ADMIN — IPRATROPIUM BROMIDE AND ALBUTEROL SULFATE 1 AMPULE: .5; 3 SOLUTION RESPIRATORY (INHALATION) at 08:07

## 2019-03-15 RX ADMIN — PRAMIPEXOLE DIHYDROCHLORIDE 0.12 MG: 0.25 TABLET ORAL at 20:32

## 2019-03-15 RX ADMIN — IPRATROPIUM BROMIDE AND ALBUTEROL SULFATE 1 AMPULE: .5; 3 SOLUTION RESPIRATORY (INHALATION) at 16:01

## 2019-03-15 RX ADMIN — IPRATROPIUM BROMIDE AND ALBUTEROL SULFATE 1 AMPULE: .5; 3 SOLUTION RESPIRATORY (INHALATION) at 20:50

## 2019-03-15 RX ADMIN — AMLODIPINE BESYLATE 5 MG: 5 TABLET ORAL at 09:15

## 2019-03-15 RX ADMIN — SODIUM CHLORIDE: 9 INJECTION, SOLUTION INTRAVENOUS at 20:31

## 2019-03-15 ASSESSMENT — PAIN SCALES - GENERAL
PAINLEVEL_OUTOF10: 0

## 2019-03-15 ASSESSMENT — ENCOUNTER SYMPTOMS
GASTROINTESTINAL NEGATIVE: 1
COUGH: 1
ALLERGIC/IMMUNOLOGIC NEGATIVE: 1
EYES NEGATIVE: 1

## 2019-03-16 ENCOUNTER — APPOINTMENT (OUTPATIENT)
Dept: GENERAL RADIOLOGY | Age: 69
DRG: 166 | End: 2019-03-16
Payer: MEDICARE

## 2019-03-16 LAB
BASOPHILS ABSOLUTE: 0 K/CU MM
BASOPHILS RELATIVE PERCENT: 0.1 % (ref 0–1)
BILIRUB SERPL-MCNC: 1 MG/DL (ref 0–1)
BILIRUBIN DIRECT: 0.3 MG/DL (ref 0–0.3)
BILIRUBIN, INDIRECT: 0.7 MG/DL (ref 0–0.7)
CREATININE URINE: 207.7 MG/DL (ref 39–259)
CULTURE: ABNORMAL
DIFFERENTIAL TYPE: ABNORMAL
EOSINOPHILS ABSOLUTE: 0 K/CU MM
EOSINOPHILS RELATIVE PERCENT: 0.4 % (ref 0–3)
ESTIMATED AVERAGE GLUCOSE: 134 MG/DL
HBA1C MFR BLD: 6.3 % (ref 4.2–6.3)
HCT VFR BLD CALC: 40.5 % (ref 42–52)
HEMOGLOBIN: 13.5 GM/DL (ref 13.5–18)
HIGH SENSITIVE C-REACTIVE PROTEIN: 88 MG/L
IMMATURE NEUTROPHIL %: 0.4 % (ref 0–0.43)
LYMPHOCYTES ABSOLUTE: 0.8 K/CU MM
LYMPHOCYTES RELATIVE PERCENT: 9.7 % (ref 24–44)
Lab: ABNORMAL
MCH RBC QN AUTO: 29.1 PG (ref 27–31)
MCHC RBC AUTO-ENTMCNC: 33.3 % (ref 32–36)
MCV RBC AUTO: 87.3 FL (ref 78–100)
MONOCYTES ABSOLUTE: 1.2 K/CU MM
MONOCYTES RELATIVE PERCENT: 15.6 % (ref 0–4)
NUCLEATED RBC %: 0 %
PDW BLD-RTO: 12.9 % (ref 11.7–14.9)
PLATELET # BLD: 150 K/CU MM (ref 140–440)
PMV BLD AUTO: 11 FL (ref 7.5–11.1)
PROCALCITONIN: 0.48
PROSTATE SPECIFIC ANTIGEN: 64.36 NG/ML (ref 0–4)
PROT/CREAT RATIO, UR: ABNORMAL
RBC # BLD: 4.64 M/CU MM (ref 4.6–6.2)
SEGMENTED NEUTROPHILS ABSOLUTE COUNT: 5.7 K/CU MM
SEGMENTED NEUTROPHILS RELATIVE PERCENT: 73.8 % (ref 36–66)
SPECIMEN: ABNORMAL
TOTAL COLONY COUNT: ABNORMAL
TOTAL IMMATURE NEUTOROPHIL: 0.03 K/CU MM
TOTAL NUCLEATED RBC: 0 K/CU MM
URINE TOTAL PROTEIN: 180.6 MG/DL
WBC # BLD: 7.8 K/CU MM (ref 4–10.5)

## 2019-03-16 PROCEDURE — 6360000002 HC RX W HCPCS: Performed by: NURSE PRACTITIONER

## 2019-03-16 PROCEDURE — 82247 BILIRUBIN TOTAL: CPT

## 2019-03-16 PROCEDURE — 94640 AIRWAY INHALATION TREATMENT: CPT

## 2019-03-16 PROCEDURE — 71046 X-RAY EXAM CHEST 2 VIEWS: CPT

## 2019-03-16 PROCEDURE — 6370000000 HC RX 637 (ALT 250 FOR IP): Performed by: NURSE PRACTITIONER

## 2019-03-16 PROCEDURE — 6370000000 HC RX 637 (ALT 250 FOR IP): Performed by: SPECIALIST

## 2019-03-16 PROCEDURE — 84145 PROCALCITONIN (PCT): CPT

## 2019-03-16 PROCEDURE — 1200000000 HC SEMI PRIVATE

## 2019-03-16 PROCEDURE — G0103 PSA SCREENING: HCPCS

## 2019-03-16 PROCEDURE — 2580000003 HC RX 258: Performed by: NURSE PRACTITIONER

## 2019-03-16 PROCEDURE — 93010 ELECTROCARDIOGRAM REPORT: CPT | Performed by: INTERNAL MEDICINE

## 2019-03-16 PROCEDURE — 82248 BILIRUBIN DIRECT: CPT

## 2019-03-16 PROCEDURE — 86141 C-REACTIVE PROTEIN HS: CPT

## 2019-03-16 PROCEDURE — 83036 HEMOGLOBIN GLYCOSYLATED A1C: CPT

## 2019-03-16 PROCEDURE — 36415 COLL VENOUS BLD VENIPUNCTURE: CPT

## 2019-03-16 PROCEDURE — 94760 N-INVAS EAR/PLS OXIMETRY 1: CPT

## 2019-03-16 PROCEDURE — 85025 COMPLETE CBC W/AUTO DIFF WBC: CPT

## 2019-03-16 RX ORDER — TAMSULOSIN HYDROCHLORIDE 0.4 MG/1
0.4 CAPSULE ORAL DAILY
Status: DISCONTINUED | OUTPATIENT
Start: 2019-03-16 | End: 2019-03-21 | Stop reason: HOSPADM

## 2019-03-16 RX ORDER — PHENAZOPYRIDINE HYDROCHLORIDE 100 MG/1
100 TABLET, FILM COATED ORAL
Status: DISCONTINUED | OUTPATIENT
Start: 2019-03-16 | End: 2019-03-21 | Stop reason: HOSPADM

## 2019-03-16 RX ADMIN — SODIUM CHLORIDE: 9 INJECTION, SOLUTION INTRAVENOUS at 19:05

## 2019-03-16 RX ADMIN — PANTOPRAZOLE SODIUM 40 MG: 40 TABLET, DELAYED RELEASE ORAL at 09:02

## 2019-03-16 RX ADMIN — AMLODIPINE BESYLATE 5 MG: 5 TABLET ORAL at 08:44

## 2019-03-16 RX ADMIN — PRAMIPEXOLE DIHYDROCHLORIDE 0.12 MG: 0.25 TABLET ORAL at 20:07

## 2019-03-16 RX ADMIN — ENOXAPARIN SODIUM 30 MG: 30 INJECTION SUBCUTANEOUS at 08:54

## 2019-03-16 RX ADMIN — CEFTRIAXONE 1 G: 1 INJECTION, POWDER, FOR SOLUTION INTRAMUSCULAR; INTRAVENOUS at 08:54

## 2019-03-16 RX ADMIN — ATORVASTATIN CALCIUM 40 MG: 40 TABLET, FILM COATED ORAL at 08:44

## 2019-03-16 RX ADMIN — AZITHROMYCIN MONOHYDRATE 500 MG: 500 INJECTION, POWDER, LYOPHILIZED, FOR SOLUTION INTRAVENOUS at 09:30

## 2019-03-16 RX ADMIN — IPRATROPIUM BROMIDE AND ALBUTEROL SULFATE 1 AMPULE: .5; 3 SOLUTION RESPIRATORY (INHALATION) at 11:16

## 2019-03-16 RX ADMIN — ASPIRIN 81 MG: 81 TABLET, COATED ORAL at 08:45

## 2019-03-16 RX ADMIN — TAMSULOSIN HYDROCHLORIDE 0.4 MG: 0.4 CAPSULE ORAL at 15:10

## 2019-03-16 RX ADMIN — PHENAZOPYRIDINE HYDROCHLORIDE 100 MG: 100 TABLET ORAL at 16:32

## 2019-03-16 RX ADMIN — SERTRALINE HYDROCHLORIDE 50 MG: 50 TABLET ORAL at 08:44

## 2019-03-16 RX ADMIN — SODIUM CHLORIDE: 9 INJECTION, SOLUTION INTRAVENOUS at 20:07

## 2019-03-16 RX ADMIN — IPRATROPIUM BROMIDE AND ALBUTEROL SULFATE 1 AMPULE: .5; 3 SOLUTION RESPIRATORY (INHALATION) at 15:30

## 2019-03-16 ASSESSMENT — PAIN SCALES - GENERAL
PAINLEVEL_OUTOF10: 0
PAINLEVEL_OUTOF10: 0

## 2019-03-17 ENCOUNTER — APPOINTMENT (OUTPATIENT)
Dept: CT IMAGING | Age: 69
DRG: 166 | End: 2019-03-17
Payer: MEDICARE

## 2019-03-17 PROCEDURE — 6370000000 HC RX 637 (ALT 250 FOR IP): Performed by: NURSE PRACTITIONER

## 2019-03-17 PROCEDURE — 6360000004 HC RX CONTRAST MEDICATION: Performed by: INTERNAL MEDICINE

## 2019-03-17 PROCEDURE — 94761 N-INVAS EAR/PLS OXIMETRY MLT: CPT

## 2019-03-17 PROCEDURE — G0009 ADMIN PNEUMOCOCCAL VACCINE: HCPCS | Performed by: INTERNAL MEDICINE

## 2019-03-17 PROCEDURE — 90670 PCV13 VACCINE IM: CPT | Performed by: INTERNAL MEDICINE

## 2019-03-17 PROCEDURE — 2580000003 HC RX 258: Performed by: NURSE PRACTITIONER

## 2019-03-17 PROCEDURE — 94640 AIRWAY INHALATION TREATMENT: CPT

## 2019-03-17 PROCEDURE — 6360000002 HC RX W HCPCS: Performed by: NURSE PRACTITIONER

## 2019-03-17 PROCEDURE — 1200000000 HC SEMI PRIVATE

## 2019-03-17 PROCEDURE — 6360000002 HC RX W HCPCS: Performed by: INTERNAL MEDICINE

## 2019-03-17 PROCEDURE — 6370000000 HC RX 637 (ALT 250 FOR IP): Performed by: SPECIALIST

## 2019-03-17 PROCEDURE — 71275 CT ANGIOGRAPHY CHEST: CPT

## 2019-03-17 RX ORDER — SODIUM CHLORIDE 0.9 % (FLUSH) 0.9 %
10 SYRINGE (ML) INJECTION PRN
Status: DISCONTINUED | OUTPATIENT
Start: 2019-03-17 | End: 2019-03-21 | Stop reason: HOSPADM

## 2019-03-17 RX ADMIN — ASPIRIN 81 MG: 81 TABLET, COATED ORAL at 08:07

## 2019-03-17 RX ADMIN — SERTRALINE HYDROCHLORIDE 50 MG: 50 TABLET ORAL at 08:07

## 2019-03-17 RX ADMIN — CEFTRIAXONE 1 G: 1 INJECTION, POWDER, FOR SOLUTION INTRAMUSCULAR; INTRAVENOUS at 08:06

## 2019-03-17 RX ADMIN — AZITHROMYCIN MONOHYDRATE 500 MG: 500 INJECTION, POWDER, LYOPHILIZED, FOR SOLUTION INTRAVENOUS at 10:12

## 2019-03-17 RX ADMIN — PHENAZOPYRIDINE HYDROCHLORIDE 100 MG: 100 TABLET ORAL at 11:45

## 2019-03-17 RX ADMIN — IOPAMIDOL 75 ML: 755 INJECTION, SOLUTION INTRAVENOUS at 14:55

## 2019-03-17 RX ADMIN — ENOXAPARIN SODIUM 30 MG: 30 INJECTION SUBCUTANEOUS at 08:07

## 2019-03-17 RX ADMIN — PHENAZOPYRIDINE HYDROCHLORIDE 100 MG: 100 TABLET ORAL at 08:06

## 2019-03-17 RX ADMIN — TAMSULOSIN HYDROCHLORIDE 0.4 MG: 0.4 CAPSULE ORAL at 08:06

## 2019-03-17 RX ADMIN — PNEUMOCOCCAL 13-VALENT CONJUGATE VACCINE 0.5 ML: 2.2; 2.2; 2.2; 2.2; 2.2; 4.4; 2.2; 2.2; 2.2; 2.2; 2.2; 2.2; 2.2 INJECTION, SUSPENSION INTRAMUSCULAR at 11:44

## 2019-03-17 RX ADMIN — AMLODIPINE BESYLATE 5 MG: 5 TABLET ORAL at 08:07

## 2019-03-17 RX ADMIN — ATORVASTATIN CALCIUM 40 MG: 40 TABLET, FILM COATED ORAL at 08:06

## 2019-03-17 RX ADMIN — PHENAZOPYRIDINE HYDROCHLORIDE 100 MG: 100 TABLET ORAL at 17:03

## 2019-03-17 RX ADMIN — IPRATROPIUM BROMIDE AND ALBUTEROL SULFATE 1 AMPULE: .5; 3 SOLUTION RESPIRATORY (INHALATION) at 11:40

## 2019-03-17 RX ADMIN — IPRATROPIUM BROMIDE AND ALBUTEROL SULFATE 1 AMPULE: .5; 3 SOLUTION RESPIRATORY (INHALATION) at 16:33

## 2019-03-17 RX ADMIN — IPRATROPIUM BROMIDE AND ALBUTEROL SULFATE 1 AMPULE: .5; 3 SOLUTION RESPIRATORY (INHALATION) at 07:48

## 2019-03-17 RX ADMIN — PRAMIPEXOLE DIHYDROCHLORIDE 0.12 MG: 0.25 TABLET ORAL at 21:18

## 2019-03-17 ASSESSMENT — PAIN SCALES - GENERAL: PAINLEVEL_OUTOF10: 0

## 2019-03-18 LAB
ALBUMIN SERPL-MCNC: 3.3 GM/DL (ref 3.4–5)
ALP BLD-CCNC: 90 IU/L (ref 40–128)
ALT SERPL-CCNC: 132 U/L (ref 10–40)
ANION GAP SERPL CALCULATED.3IONS-SCNC: 12 MMOL/L (ref 4–16)
AST SERPL-CCNC: 96 IU/L (ref 15–37)
BILIRUB SERPL-MCNC: 0.7 MG/DL (ref 0–1)
BUN BLDV-MCNC: 13 MG/DL (ref 6–23)
CALCIUM SERPL-MCNC: 8.4 MG/DL (ref 8.3–10.6)
CHLORIDE BLD-SCNC: 106 MMOL/L (ref 99–110)
CO2: 20 MMOL/L (ref 21–32)
CREAT SERPL-MCNC: 0.8 MG/DL (ref 0.9–1.3)
GFR AFRICAN AMERICAN: >60 ML/MIN/1.73M2
GFR NON-AFRICAN AMERICAN: >60 ML/MIN/1.73M2
GLUCOSE BLD-MCNC: 119 MG/DL (ref 70–99)
POTASSIUM SERPL-SCNC: 3.8 MMOL/L (ref 3.5–5.1)
SODIUM BLD-SCNC: 138 MMOL/L (ref 135–145)
TOTAL PROTEIN: 5.9 GM/DL (ref 6.4–8.2)

## 2019-03-18 PROCEDURE — 80053 COMPREHEN METABOLIC PANEL: CPT

## 2019-03-18 PROCEDURE — 94640 AIRWAY INHALATION TREATMENT: CPT

## 2019-03-18 PROCEDURE — 6370000000 HC RX 637 (ALT 250 FOR IP): Performed by: INTERNAL MEDICINE

## 2019-03-18 PROCEDURE — 51798 US URINE CAPACITY MEASURE: CPT

## 2019-03-18 PROCEDURE — 6370000000 HC RX 637 (ALT 250 FOR IP): Performed by: NURSE PRACTITIONER

## 2019-03-18 PROCEDURE — 94761 N-INVAS EAR/PLS OXIMETRY MLT: CPT

## 2019-03-18 PROCEDURE — 6370000000 HC RX 637 (ALT 250 FOR IP): Performed by: SPECIALIST

## 2019-03-18 PROCEDURE — 6360000002 HC RX W HCPCS: Performed by: NURSE PRACTITIONER

## 2019-03-18 PROCEDURE — 36415 COLL VENOUS BLD VENIPUNCTURE: CPT

## 2019-03-18 PROCEDURE — 2580000003 HC RX 258: Performed by: NURSE PRACTITIONER

## 2019-03-18 PROCEDURE — 99223 1ST HOSP IP/OBS HIGH 75: CPT | Performed by: INTERNAL MEDICINE

## 2019-03-18 PROCEDURE — 1200000000 HC SEMI PRIVATE

## 2019-03-18 RX ORDER — IPRATROPIUM BROMIDE AND ALBUTEROL SULFATE 2.5; .5 MG/3ML; MG/3ML
1 SOLUTION RESPIRATORY (INHALATION) 4 TIMES DAILY
Status: DISCONTINUED | OUTPATIENT
Start: 2019-03-18 | End: 2019-03-21 | Stop reason: HOSPADM

## 2019-03-18 RX ADMIN — SODIUM CHLORIDE, PRESERVATIVE FREE 10 ML: 5 INJECTION INTRAVENOUS at 21:17

## 2019-03-18 RX ADMIN — SODIUM CHLORIDE: 9 INJECTION, SOLUTION INTRAVENOUS at 16:19

## 2019-03-18 RX ADMIN — PANTOPRAZOLE SODIUM 40 MG: 40 TABLET, DELAYED RELEASE ORAL at 05:27

## 2019-03-18 RX ADMIN — PHENAZOPYRIDINE HYDROCHLORIDE 100 MG: 100 TABLET ORAL at 08:08

## 2019-03-18 RX ADMIN — ASPIRIN 81 MG: 81 TABLET, COATED ORAL at 08:08

## 2019-03-18 RX ADMIN — PHENAZOPYRIDINE HYDROCHLORIDE 100 MG: 100 TABLET ORAL at 12:02

## 2019-03-18 RX ADMIN — IPRATROPIUM BROMIDE AND ALBUTEROL SULFATE 1 AMPULE: .5; 3 SOLUTION RESPIRATORY (INHALATION) at 15:16

## 2019-03-18 RX ADMIN — IPRATROPIUM BROMIDE AND ALBUTEROL SULFATE 1 AMPULE: .5; 3 SOLUTION RESPIRATORY (INHALATION) at 19:54

## 2019-03-18 RX ADMIN — ENOXAPARIN SODIUM 30 MG: 30 INJECTION SUBCUTANEOUS at 08:07

## 2019-03-18 RX ADMIN — CEFTRIAXONE 1 G: 1 INJECTION, POWDER, FOR SOLUTION INTRAMUSCULAR; INTRAVENOUS at 08:09

## 2019-03-18 RX ADMIN — IPRATROPIUM BROMIDE AND ALBUTEROL SULFATE 1 AMPULE: .5; 3 SOLUTION RESPIRATORY (INHALATION) at 07:22

## 2019-03-18 RX ADMIN — AZITHROMYCIN MONOHYDRATE 500 MG: 500 INJECTION, POWDER, LYOPHILIZED, FOR SOLUTION INTRAVENOUS at 10:10

## 2019-03-18 RX ADMIN — SODIUM CHLORIDE: 9 INJECTION, SOLUTION INTRAVENOUS at 01:49

## 2019-03-18 RX ADMIN — ATORVASTATIN CALCIUM 40 MG: 40 TABLET, FILM COATED ORAL at 08:08

## 2019-03-18 RX ADMIN — IPRATROPIUM BROMIDE AND ALBUTEROL SULFATE 1 AMPULE: .5; 3 SOLUTION RESPIRATORY (INHALATION) at 11:20

## 2019-03-18 RX ADMIN — TAMSULOSIN HYDROCHLORIDE 0.4 MG: 0.4 CAPSULE ORAL at 08:08

## 2019-03-18 RX ADMIN — PRAMIPEXOLE DIHYDROCHLORIDE 0.12 MG: 0.25 TABLET ORAL at 20:55

## 2019-03-18 RX ADMIN — SERTRALINE HYDROCHLORIDE 50 MG: 50 TABLET ORAL at 08:08

## 2019-03-18 RX ADMIN — PHENAZOPYRIDINE HYDROCHLORIDE 100 MG: 100 TABLET ORAL at 16:19

## 2019-03-18 ASSESSMENT — PAIN SCALES - GENERAL
PAINLEVEL_OUTOF10: 0
PAINLEVEL_OUTOF10: 0

## 2019-03-19 ENCOUNTER — APPOINTMENT (OUTPATIENT)
Dept: GENERAL RADIOLOGY | Age: 69
DRG: 166 | End: 2019-03-19
Payer: MEDICARE

## 2019-03-19 ENCOUNTER — APPOINTMENT (OUTPATIENT)
Dept: NUCLEAR MEDICINE | Age: 69
DRG: 166 | End: 2019-03-19
Payer: MEDICARE

## 2019-03-19 LAB
APTT: 23.4 SECONDS (ref 21.2–33)
CULTURE: NORMAL
CULTURE: NORMAL
HAV IGM SER IA-ACNC: NON REACTIVE
HEPATITIS B CORE IGM ANTIBODY: NON REACTIVE
HEPATITIS B SURFACE ANTIGEN: NON REACTIVE
HEPATITIS C ANTIBODY: NON REACTIVE
INR BLD: 1.07 INDEX
LV EF: 53 %
LVEF MODALITY: NORMAL
Lab: NORMAL
Lab: NORMAL
PROTHROMBIN TIME: 12.2 SECONDS (ref 9.12–12.5)
SPECIMEN: NORMAL
SPECIMEN: NORMAL

## 2019-03-19 PROCEDURE — 85610 PROTHROMBIN TIME: CPT

## 2019-03-19 PROCEDURE — 2580000003 HC RX 258: Performed by: NURSE PRACTITIONER

## 2019-03-19 PROCEDURE — 1200000000 HC SEMI PRIVATE

## 2019-03-19 PROCEDURE — 6370000000 HC RX 637 (ALT 250 FOR IP): Performed by: INTERNAL MEDICINE

## 2019-03-19 PROCEDURE — 78306 BONE IMAGING WHOLE BODY: CPT

## 2019-03-19 PROCEDURE — 94640 AIRWAY INHALATION TREATMENT: CPT

## 2019-03-19 PROCEDURE — 99221 1ST HOSP IP/OBS SF/LOW 40: CPT | Performed by: INTERNAL MEDICINE

## 2019-03-19 PROCEDURE — 3430000000 HC RX DIAGNOSTIC RADIOPHARMACEUTICAL: Performed by: INTERNAL MEDICINE

## 2019-03-19 PROCEDURE — 71046 X-RAY EXAM CHEST 2 VIEWS: CPT

## 2019-03-19 PROCEDURE — 80074 ACUTE HEPATITIS PANEL: CPT

## 2019-03-19 PROCEDURE — 85730 THROMBOPLASTIN TIME PARTIAL: CPT

## 2019-03-19 PROCEDURE — 94762 N-INVAS EAR/PLS OXIMTRY CONT: CPT

## 2019-03-19 PROCEDURE — 84165 PROTEIN E-PHORESIS SERUM: CPT

## 2019-03-19 PROCEDURE — 93306 TTE W/DOPPLER COMPLETE: CPT

## 2019-03-19 PROCEDURE — 6360000002 HC RX W HCPCS: Performed by: NURSE PRACTITIONER

## 2019-03-19 PROCEDURE — 36415 COLL VENOUS BLD VENIPUNCTURE: CPT

## 2019-03-19 PROCEDURE — 99232 SBSQ HOSP IP/OBS MODERATE 35: CPT | Performed by: INTERNAL MEDICINE

## 2019-03-19 PROCEDURE — 6370000000 HC RX 637 (ALT 250 FOR IP): Performed by: NURSE PRACTITIONER

## 2019-03-19 PROCEDURE — 6370000000 HC RX 637 (ALT 250 FOR IP): Performed by: SPECIALIST

## 2019-03-19 PROCEDURE — 83883 ASSAY NEPHELOMETRY NOT SPEC: CPT

## 2019-03-19 PROCEDURE — A9503 TC99M MEDRONATE: HCPCS | Performed by: INTERNAL MEDICINE

## 2019-03-19 PROCEDURE — 51798 US URINE CAPACITY MEASURE: CPT

## 2019-03-19 PROCEDURE — 94761 N-INVAS EAR/PLS OXIMETRY MLT: CPT

## 2019-03-19 RX ORDER — TC 99M MEDRONATE 20 MG/10ML
25 INJECTION, POWDER, LYOPHILIZED, FOR SOLUTION INTRAVENOUS
Status: COMPLETED | OUTPATIENT
Start: 2019-03-19 | End: 2019-03-19

## 2019-03-19 RX ADMIN — CEFTRIAXONE 1 G: 1 INJECTION, POWDER, FOR SOLUTION INTRAMUSCULAR; INTRAVENOUS at 08:31

## 2019-03-19 RX ADMIN — AZITHROMYCIN MONOHYDRATE 500 MG: 500 INJECTION, POWDER, LYOPHILIZED, FOR SOLUTION INTRAVENOUS at 09:23

## 2019-03-19 RX ADMIN — PHENAZOPYRIDINE HYDROCHLORIDE 100 MG: 100 TABLET ORAL at 08:28

## 2019-03-19 RX ADMIN — SODIUM CHLORIDE, PRESERVATIVE FREE 10 ML: 5 INJECTION INTRAVENOUS at 21:11

## 2019-03-19 RX ADMIN — PANTOPRAZOLE SODIUM 40 MG: 40 TABLET, DELAYED RELEASE ORAL at 05:47

## 2019-03-19 RX ADMIN — SODIUM CHLORIDE, PRESERVATIVE FREE 10 ML: 5 INJECTION INTRAVENOUS at 08:29

## 2019-03-19 RX ADMIN — PRAMIPEXOLE DIHYDROCHLORIDE 0.12 MG: 0.25 TABLET ORAL at 21:11

## 2019-03-19 RX ADMIN — IPRATROPIUM BROMIDE AND ALBUTEROL SULFATE 1 AMPULE: .5; 3 SOLUTION RESPIRATORY (INHALATION) at 19:35

## 2019-03-19 RX ADMIN — AMLODIPINE BESYLATE 5 MG: 5 TABLET ORAL at 08:28

## 2019-03-19 RX ADMIN — PHENAZOPYRIDINE HYDROCHLORIDE 100 MG: 100 TABLET ORAL at 16:45

## 2019-03-19 RX ADMIN — IPRATROPIUM BROMIDE AND ALBUTEROL SULFATE 1 AMPULE: .5; 3 SOLUTION RESPIRATORY (INHALATION) at 08:44

## 2019-03-19 RX ADMIN — ASPIRIN 81 MG: 81 TABLET, COATED ORAL at 08:28

## 2019-03-19 RX ADMIN — TAMSULOSIN HYDROCHLORIDE 0.4 MG: 0.4 CAPSULE ORAL at 08:28

## 2019-03-19 RX ADMIN — ENOXAPARIN SODIUM 30 MG: 30 INJECTION SUBCUTANEOUS at 08:29

## 2019-03-19 RX ADMIN — SERTRALINE HYDROCHLORIDE 50 MG: 50 TABLET ORAL at 08:29

## 2019-03-19 RX ADMIN — IPRATROPIUM BROMIDE AND ALBUTEROL SULFATE 1 AMPULE: .5; 3 SOLUTION RESPIRATORY (INHALATION) at 12:13

## 2019-03-19 RX ADMIN — PHENAZOPYRIDINE HYDROCHLORIDE 100 MG: 100 TABLET ORAL at 11:29

## 2019-03-19 RX ADMIN — TC 99M MEDRONATE 25 MILLICURIE: 20 INJECTION, POWDER, LYOPHILIZED, FOR SOLUTION INTRAVENOUS at 09:45

## 2019-03-19 RX ADMIN — ATORVASTATIN CALCIUM 40 MG: 40 TABLET, FILM COATED ORAL at 08:28

## 2019-03-19 ASSESSMENT — PAIN SCALES - GENERAL
PAINLEVEL_OUTOF10: 0
PAINLEVEL_OUTOF10: 0

## 2019-03-20 ENCOUNTER — APPOINTMENT (OUTPATIENT)
Dept: CT IMAGING | Age: 69
DRG: 166 | End: 2019-03-20
Payer: MEDICARE

## 2019-03-20 LAB
ALBUMIN SERPL-MCNC: 3.3 GM/DL (ref 3.4–5)
ALP BLD-CCNC: 86 IU/L (ref 40–129)
ALT SERPL-CCNC: 136 U/L (ref 10–40)
AST SERPL-CCNC: 79 IU/L (ref 15–37)
BILIRUB SERPL-MCNC: 0.7 MG/DL (ref 0–1)
BILIRUBIN DIRECT: 0.2 MG/DL (ref 0–0.3)
BILIRUBIN, INDIRECT: 0.5 MG/DL (ref 0–0.7)
TOTAL PROTEIN: 6.1 GM/DL (ref 6.4–8.2)

## 2019-03-20 PROCEDURE — 94761 N-INVAS EAR/PLS OXIMETRY MLT: CPT

## 2019-03-20 PROCEDURE — 20220 BONE BIOPSY TROCAR/NDL SUPFC: CPT

## 2019-03-20 PROCEDURE — 6370000000 HC RX 637 (ALT 250 FOR IP): Performed by: NURSE PRACTITIONER

## 2019-03-20 PROCEDURE — 36415 COLL VENOUS BLD VENIPUNCTURE: CPT

## 2019-03-20 PROCEDURE — 80076 HEPATIC FUNCTION PANEL: CPT

## 2019-03-20 PROCEDURE — 6370000000 HC RX 637 (ALT 250 FOR IP): Performed by: INTERNAL MEDICINE

## 2019-03-20 PROCEDURE — 99231 SBSQ HOSP IP/OBS SF/LOW 25: CPT | Performed by: INTERNAL MEDICINE

## 2019-03-20 PROCEDURE — 88311 DECALCIFY TISSUE: CPT

## 2019-03-20 PROCEDURE — 94640 AIRWAY INHALATION TREATMENT: CPT

## 2019-03-20 PROCEDURE — 6360000002 HC RX W HCPCS: Performed by: NURSE PRACTITIONER

## 2019-03-20 PROCEDURE — 88307 TISSUE EXAM BY PATHOLOGIST: CPT

## 2019-03-20 PROCEDURE — 0PB43ZX EXCISION OF THORACIC VERTEBRA, PERCUTANEOUS APPROACH, DIAGNOSTIC: ICD-10-PCS | Performed by: RADIOLOGY

## 2019-03-20 PROCEDURE — 1200000000 HC SEMI PRIVATE

## 2019-03-20 PROCEDURE — 2580000003 HC RX 258: Performed by: NURSE PRACTITIONER

## 2019-03-20 PROCEDURE — 88185 FLOWCYTOMETRY/TC ADD-ON: CPT

## 2019-03-20 PROCEDURE — 99232 SBSQ HOSP IP/OBS MODERATE 35: CPT | Performed by: INTERNAL MEDICINE

## 2019-03-20 PROCEDURE — 88184 FLOWCYTOMETRY/ TC 1 MARKER: CPT

## 2019-03-20 PROCEDURE — 2709999900 HC NON-CHARGEABLE SUPPLY

## 2019-03-20 PROCEDURE — 6370000000 HC RX 637 (ALT 250 FOR IP): Performed by: SPECIALIST

## 2019-03-20 RX ADMIN — PHENAZOPYRIDINE HYDROCHLORIDE 100 MG: 100 TABLET ORAL at 16:17

## 2019-03-20 RX ADMIN — AZITHROMYCIN MONOHYDRATE 500 MG: 500 INJECTION, POWDER, LYOPHILIZED, FOR SOLUTION INTRAVENOUS at 11:48

## 2019-03-20 RX ADMIN — CEFTRIAXONE 1 G: 1 INJECTION, POWDER, FOR SOLUTION INTRAMUSCULAR; INTRAVENOUS at 10:10

## 2019-03-20 RX ADMIN — ATORVASTATIN CALCIUM 40 MG: 40 TABLET, FILM COATED ORAL at 10:22

## 2019-03-20 RX ADMIN — ASPIRIN 81 MG: 81 TABLET, COATED ORAL at 10:22

## 2019-03-20 RX ADMIN — ENOXAPARIN SODIUM 30 MG: 30 INJECTION SUBCUTANEOUS at 10:28

## 2019-03-20 RX ADMIN — PHENAZOPYRIDINE HYDROCHLORIDE 100 MG: 100 TABLET ORAL at 10:38

## 2019-03-20 RX ADMIN — PRAMIPEXOLE DIHYDROCHLORIDE 0.12 MG: 0.25 TABLET ORAL at 20:52

## 2019-03-20 RX ADMIN — TAMSULOSIN HYDROCHLORIDE 0.4 MG: 0.4 CAPSULE ORAL at 10:24

## 2019-03-20 RX ADMIN — PHENAZOPYRIDINE HYDROCHLORIDE 100 MG: 100 TABLET ORAL at 20:52

## 2019-03-20 RX ADMIN — SERTRALINE HYDROCHLORIDE 50 MG: 50 TABLET ORAL at 10:25

## 2019-03-20 RX ADMIN — SODIUM CHLORIDE, PRESERVATIVE FREE 10 ML: 5 INJECTION INTRAVENOUS at 20:53

## 2019-03-20 RX ADMIN — IPRATROPIUM BROMIDE AND ALBUTEROL SULFATE 1 AMPULE: .5; 3 SOLUTION RESPIRATORY (INHALATION) at 07:45

## 2019-03-20 RX ADMIN — PANTOPRAZOLE SODIUM 40 MG: 40 TABLET, DELAYED RELEASE ORAL at 10:37

## 2019-03-20 RX ADMIN — SODIUM CHLORIDE, PRESERVATIVE FREE 10 ML: 5 INJECTION INTRAVENOUS at 11:49

## 2019-03-20 RX ADMIN — IPRATROPIUM BROMIDE AND ALBUTEROL SULFATE 1 AMPULE: .5; 3 SOLUTION RESPIRATORY (INHALATION) at 15:48

## 2019-03-20 ASSESSMENT — PAIN SCALES - GENERAL
PAINLEVEL_OUTOF10: 0

## 2019-03-21 VITALS
SYSTOLIC BLOOD PRESSURE: 128 MMHG | TEMPERATURE: 98 F | BODY MASS INDEX: 30.01 KG/M2 | RESPIRATION RATE: 16 BRPM | DIASTOLIC BLOOD PRESSURE: 76 MMHG | HEIGHT: 68 IN | WEIGHT: 198 LBS | OXYGEN SATURATION: 92 % | HEART RATE: 61 BPM

## 2019-03-21 PROCEDURE — 99232 SBSQ HOSP IP/OBS MODERATE 35: CPT | Performed by: INTERNAL MEDICINE

## 2019-03-21 PROCEDURE — 6370000000 HC RX 637 (ALT 250 FOR IP): Performed by: NURSE PRACTITIONER

## 2019-03-21 PROCEDURE — 6370000000 HC RX 637 (ALT 250 FOR IP): Performed by: SPECIALIST

## 2019-03-21 RX ORDER — TAMSULOSIN HYDROCHLORIDE 0.4 MG/1
0.4 CAPSULE ORAL DAILY
Qty: 30 CAPSULE | Refills: 3 | Status: SHIPPED | OUTPATIENT
Start: 2019-03-22 | End: 2019-08-19 | Stop reason: ALTCHOICE

## 2019-03-21 RX ORDER — CIPROFLOXACIN 500 MG/1
500 TABLET, FILM COATED ORAL 2 TIMES DAILY
Qty: 14 TABLET | Refills: 0 | Status: SHIPPED | OUTPATIENT
Start: 2019-03-21 | End: 2019-03-28

## 2019-03-21 RX ADMIN — ATORVASTATIN CALCIUM 40 MG: 40 TABLET, FILM COATED ORAL at 08:33

## 2019-03-21 RX ADMIN — PHENAZOPYRIDINE HYDROCHLORIDE 100 MG: 100 TABLET ORAL at 08:33

## 2019-03-21 RX ADMIN — AMLODIPINE BESYLATE 5 MG: 5 TABLET ORAL at 08:33

## 2019-03-21 RX ADMIN — ASPIRIN 81 MG: 81 TABLET, COATED ORAL at 08:33

## 2019-03-21 RX ADMIN — PANTOPRAZOLE SODIUM 40 MG: 40 TABLET, DELAYED RELEASE ORAL at 06:18

## 2019-03-21 RX ADMIN — TAMSULOSIN HYDROCHLORIDE 0.4 MG: 0.4 CAPSULE ORAL at 08:33

## 2019-03-21 RX ADMIN — SERTRALINE HYDROCHLORIDE 50 MG: 50 TABLET ORAL at 08:33

## 2019-03-21 ASSESSMENT — PAIN SCALES - GENERAL
PAINLEVEL_OUTOF10: 0

## 2019-03-22 LAB
EKG ATRIAL RATE: 75 BPM
EKG DIAGNOSIS: NORMAL
EKG P AXIS: 4 DEGREES
EKG P-R INTERVAL: 186 MS
EKG Q-T INTERVAL: 380 MS
EKG QRS DURATION: 96 MS
EKG QTC CALCULATION (BAZETT): 424 MS
EKG R AXIS: -24 DEGREES
EKG T AXIS: 19 DEGREES
EKG VENTRICULAR RATE: 75 BPM
KAPPA QUANT FREE LIGHT CHAINS: 1.82 MG/DL (ref 0.33–1.94)
KAPPA/LAMBDA FREE LIGHT CHAIN RATIO: 1.21 (ref 0.26–1.65)
KAPPA/LAMBDA FREE LIGHT CHAIN RATIO: NORMAL (ref 0.26–1.65)
LAMBDA FREE LIGHT CHAINS URINE/ VOL: 1.51 MG/DL (ref 0.57–2.63)

## 2019-03-23 LAB
ALBUMIN ELP: 2.8 GM/DL (ref 3.2–5.6)
ALPHA-1-GLOBULIN: 0.3 GM/DL (ref 0.1–0.4)
ALPHA-2-GLOBULIN: 0.9 GM/DL (ref 0.4–1.2)
BETA GLOBULIN: 1 GM/DL (ref 0.5–1.3)
GAMMA GLOBULIN: 1.1 GM/DL (ref 0.5–1.6)
SPEP INTERPRETATION: ABNORMAL
TOTAL PROTEIN: 6.1 GM/DL (ref 6.4–8.2)

## 2019-04-01 LAB — PROSTATE SPECIFIC ANTIGEN: 6.92 NG/ML (ref 0–4)

## 2019-04-05 ENCOUNTER — INITIAL CONSULT (OUTPATIENT)
Dept: PULMONOLOGY | Age: 69
End: 2019-04-05
Payer: MEDICARE

## 2019-04-05 VITALS
WEIGHT: 199 LBS | HEIGHT: 68 IN | HEART RATE: 66 BPM | DIASTOLIC BLOOD PRESSURE: 52 MMHG | OXYGEN SATURATION: 95 % | SYSTOLIC BLOOD PRESSURE: 100 MMHG | BODY MASS INDEX: 30.16 KG/M2

## 2019-04-05 DIAGNOSIS — G47.19 EXCESSIVE DAYTIME SLEEPINESS: ICD-10-CM

## 2019-04-05 DIAGNOSIS — J42 CHRONIC BRONCHITIS, UNSPECIFIED CHRONIC BRONCHITIS TYPE (HCC): ICD-10-CM

## 2019-04-05 DIAGNOSIS — G47.33 OBSTRUCTIVE SLEEP APNEA: ICD-10-CM

## 2019-04-05 DIAGNOSIS — G47.34 NOCTURNAL HYPOXEMIA: ICD-10-CM

## 2019-04-05 DIAGNOSIS — R06.02 SHORTNESS OF BREATH: Primary | ICD-10-CM

## 2019-04-05 PROBLEM — J44.9 COPD (CHRONIC OBSTRUCTIVE PULMONARY DISEASE) (HCC): Status: ACTIVE | Noted: 2019-04-05

## 2019-04-05 LAB
EXPIRATORY TIME-POST: NORMAL SEC
EXPIRATORY TIME: NORMAL SEC
FEF 25-75% %CHNG: NORMAL
FEF 25-75% %PRED-POST: NORMAL %
FEF 25-75% %PRED-PRE: NORMAL L/SEC
FEF 25-75% PRED: NORMAL L/SEC
FEF 25-75%-POST: NORMAL L/SEC
FEF 25-75%-PRE: NORMAL L/SEC
FEV1 %PRED-POST: 43 %
FEV1 %PRED-PRE: 62 %
FEV1 PRED: 3.01 L
FEV1-POST: 1.28 L
FEV1-PRE: 1.85 L
FEV1/FVC %PRED-POST: 54 %
FEV1/FVC %PRED-PRE: 69 %
FEV1/FVC PRED: 73.8 %
FEV1/FVC-POST: 39.6 %
FEV1/FVC-PRE: 50.8 %
FVC %PRED-POST: 79 L
FVC %PRED-PRE: 89 %
FVC PRED: 4.09 L
FVC-POST: 3.23 L
FVC-PRE: 3.64 L
PEF %PRED-POST: NORMAL %
PEF %PRED-PRE: NORMAL L/SEC
PEF PRED: NORMAL L/SEC
PEF%CHNG: NORMAL
PEF-POST: NORMAL L/SEC
PEF-PRE: NORMAL L/SEC

## 2019-04-05 PROCEDURE — 3023F SPIROM DOC REV: CPT | Performed by: INTERNAL MEDICINE

## 2019-04-05 PROCEDURE — 3017F COLORECTAL CA SCREEN DOC REV: CPT | Performed by: INTERNAL MEDICINE

## 2019-04-05 PROCEDURE — G8417 CALC BMI ABV UP PARAM F/U: HCPCS | Performed by: INTERNAL MEDICINE

## 2019-04-05 PROCEDURE — 1111F DSCHRG MED/CURRENT MED MERGE: CPT | Performed by: INTERNAL MEDICINE

## 2019-04-05 PROCEDURE — 4040F PNEUMOC VAC/ADMIN/RCVD: CPT | Performed by: INTERNAL MEDICINE

## 2019-04-05 PROCEDURE — G8598 ASA/ANTIPLAT THER USED: HCPCS | Performed by: INTERNAL MEDICINE

## 2019-04-05 PROCEDURE — 1036F TOBACCO NON-USER: CPT | Performed by: INTERNAL MEDICINE

## 2019-04-05 PROCEDURE — G8926 SPIRO NO PERF OR DOC: HCPCS | Performed by: INTERNAL MEDICINE

## 2019-04-05 PROCEDURE — G8427 DOCREV CUR MEDS BY ELIG CLIN: HCPCS | Performed by: INTERNAL MEDICINE

## 2019-04-05 PROCEDURE — 1123F ACP DISCUSS/DSCN MKR DOCD: CPT | Performed by: INTERNAL MEDICINE

## 2019-04-05 PROCEDURE — 99214 OFFICE O/P EST MOD 30 MIN: CPT | Performed by: INTERNAL MEDICINE

## 2019-04-05 ASSESSMENT — PULMONARY FUNCTION TESTS
FEV1_PERCENT_PREDICTED_PRE: 62
FVC_PERCENT_PREDICTED_PRE: 89
FEV1_POST: 1.28
FEV1/FVC_POST: 39.6
FVC_POST: 3.23
FEV1/FVC_PRE: 50.8
FVC_PERCENT_PREDICTED_POST: 79
FEV1_PERCENT_PREDICTED_POST: 43
FEV1/FVC_PERCENT_PREDICTED_POST: 54
FVC_PRE: 3.64
FVC_PREDICTED: 4.09
FEV1/FVC_PREDICTED: 73.8
FEV1_PREDICTED: 3.01
FEV1_PRE: 1.85
FEV1/FVC_PERCENT_PREDICTED_PRE: 69

## 2019-04-05 NOTE — PROGRESS NOTES
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 He did have biopsy which was negative. During that hospitalization he was noted have mild pulmonary atelectasis and a history suggesting obstructive sleep apnea with excessive daytime sleepiness. He did have apnea screen is demonstrated an AHI of 23.5 events per hour. He underwent home oxygen evaluation and did qualify for nocturnal oxygen. He is feeling much better. He denies chest pain or chest discomfort. He does not have a history of COPD or chronic lung disease and has not been on inhaled medications. He mentions he's had several sleep studies in the past but was never prescribed CPAP therapy but was told that he had idiopathic hypersomnolence. Mentions that he never smoked cigarettes but did smoke a pipe for a number of years. He denies ever being asthmatic or having recurrent episodes of bronchitis. He did have significant secondary smoke exposure  OBJECTIVE:  BP (!) 100/52   Pulse 66   Ht 5' 8\" (1.727 m)   Wt 199 lb (90.3 kg)   SpO2 95%   BMI 30.26 kg/m²    Mallampati IIII  [place] sleepiness scale 11. Neck circumference 17 inches. BMI 30.26  Physical Exam:  Constitutional:  He appears well developed and well-nourished. Neck:  Supple, No palpable lymphadenopathy, No JVD  Cardiovascular:  S1, S2 Normal, Regular rhythm, no murmurs or gallops, No pericardial  rubs. Pulmonary:  Breath sounds are clear throughout all areas.  I hear no wheezing or rhonchi in his no pleural rubs  Abdomen: Not examined  Extremities: no edema, No DVT  Neurologic:  Awake and Alert, No focal deficits    Radiology: Chest x-ray on 3/19/19 showed no acute process was stable exam  PFT: Office spirometry demonstrates a severe obstructive defect. Following bronchodilators. He had no significant response       ASSESSMENT:    1. Shortness of breath    2. Nocturnal hypoxemia    3. Obstructive sleep apnea    4. Excessive daytime sleepiness    5. Chronic bronchitis, unspecified chronic bronchitis type ([address]          PLAN:  I am concerned that he might have advanced COPD and I would like to repeat his pulmonary function tests at [facility] lab at a later date. I do not have a good explanation for why he has such advanced loss of lung function on PFT. For the time being and since he remains asymptomatic, I am not going to start bronchodilator therapy. I will obtain a formal split-night sleep study to evaluate his abnormal apnea screen within AHI of 23.5 events per hour. He also has a history of excessive daytime sleepiness. I will continue to follow him  Return in about 3 months (around 7/5/2019) for Nocturnal hypoxemia, Recheck for Obstructive Sleep Apnea, Recheck for Shortness of Breath. This dictation was performed with a verbal recognition program and it was checked for errors. It is possible that there are still dictated errors within this office note. Any errors should be brought immediately to my attention for correction. All efforts were made to ensure that this office note is accurate.

## 2019-04-09 DIAGNOSIS — G47.34 NOCTURNAL HYPOXEMIA: ICD-10-CM

## 2019-04-09 DIAGNOSIS — R06.02 SHORTNESS OF BREATH: ICD-10-CM

## 2019-04-09 PROCEDURE — 94060 EVALUATION OF WHEEZING: CPT | Performed by: INTERNAL MEDICINE

## 2019-04-13 PROBLEM — N39.0 UTI (URINARY TRACT INFECTION): Status: RESOLVED | Noted: 2019-03-14 | Resolved: 2019-04-13

## 2019-04-25 RX ORDER — AMLODIPINE BESYLATE 5 MG/1
5 TABLET ORAL DAILY
Qty: 30 TABLET | Refills: 5 | Status: SHIPPED | OUTPATIENT
Start: 2019-04-25 | End: 2019-11-13 | Stop reason: SDUPTHER

## 2019-04-29 ENCOUNTER — HOSPITAL ENCOUNTER (OUTPATIENT)
Dept: SLEEP CENTER | Age: 69
Discharge: HOME | End: 2019-04-29
Payer: MEDICARE

## 2019-04-29 PROCEDURE — 95810 POLYSOM 6/> YRS 4/> PARAM: CPT | Performed by: INTERNAL MEDICINE

## 2019-04-29 PROCEDURE — 95810 POLYSOM 6/> YRS 4/> PARAM: CPT

## 2019-04-29 ASSESSMENT — SLEEP AND FATIGUE QUESTIONNAIRES
HOW LIKELY ARE YOU TO NOD OFF OR FALL ASLEEP WHILE WATCHING TV: 2
HOW LIKELY ARE YOU TO NOD OFF OR FALL ASLEEP WHILE LYING DOWN TO REST IN THE AFTERNOON WHEN CIRCUMSTANCES PERMIT: 1
HOW LIKELY ARE YOU TO NOD OFF OR FALL ASLEEP WHEN YOU ARE A PASSENGER IN A CAR FOR AN HOUR WITHOUT A BREAK: 1
HOW LIKELY ARE YOU TO NOD OFF OR FALL ASLEEP WHILE SITTING AND TALKING TO SOMEONE: 1
HOW LIKELY ARE YOU TO NOD OFF OR FALL ASLEEP IN A CAR, WHILE STOPPED FOR A FEW MINUTES IN TRAFFIC: 0
HOW LIKELY ARE YOU TO NOD OFF OR FALL ASLEEP WHILE SITTING INACTIVE IN A PUBLIC PLACE: 2
ESS TOTAL SCORE: 10
HOW LIKELY ARE YOU TO NOD OFF OR FALL ASLEEP WHILE SITTING QUIETLY AFTER LUNCH WITHOUT ALCOHOL: 1
HOW LIKELY ARE YOU TO NOD OFF OR FALL ASLEEP WHILE SITTING AND READING: 2

## 2019-04-30 NOTE — PROGRESS NOTES
4/30/2019  sleep study  for [facility]  1950 is complete. Results are pending physician review.     Electronically signed by Cj Heaton on 4/30/2019 at 6:02 AM

## 2019-05-02 NOTE — PROGRESS NOTES
Results for the most recent sleep study on Kelly Wong  1950 are finalized and available. Please see media tab.     Electronically signed by Alex Bird on 5/2/2019 at 4:22 PM

## 2019-05-23 RX ORDER — PRAMIPEXOLE DIHYDROCHLORIDE 0.12 MG/1
TABLET ORAL
Qty: 30 TABLET | Refills: 5 | Status: SHIPPED | OUTPATIENT
Start: 2019-05-23 | End: 2019-11-27 | Stop reason: SDUPTHER

## 2019-05-30 ENCOUNTER — OFFICE VISIT (OUTPATIENT)
Dept: PULMONOLOGY | Age: 69
End: 2019-05-30
Payer: MEDICARE

## 2019-05-30 VITALS
DIASTOLIC BLOOD PRESSURE: 78 MMHG | HEART RATE: 74 BPM | RESPIRATION RATE: 12 BRPM | BODY MASS INDEX: 30.16 KG/M2 | SYSTOLIC BLOOD PRESSURE: 118 MMHG | WEIGHT: 199 LBS | HEIGHT: 68 IN | OXYGEN SATURATION: 94 %

## 2019-05-30 DIAGNOSIS — R06.09 DYSPNEA ON EXERTION: ICD-10-CM

## 2019-05-30 DIAGNOSIS — J42 CHRONIC BRONCHITIS, UNSPECIFIED CHRONIC BRONCHITIS TYPE (HCC): Primary | ICD-10-CM

## 2019-05-30 DIAGNOSIS — G47.34 NOCTURNAL HYPOXEMIA: ICD-10-CM

## 2019-05-30 DIAGNOSIS — G47.61 PERIODIC LIMB MOVEMENTS OF SLEEP: ICD-10-CM

## 2019-05-30 PROBLEM — G47.33 OSA (OBSTRUCTIVE SLEEP APNEA): Status: RESOLVED | Noted: 2019-04-05 | Resolved: 2019-05-30

## 2019-05-30 PROCEDURE — 1123F ACP DISCUSS/DSCN MKR DOCD: CPT | Performed by: INTERNAL MEDICINE

## 2019-05-30 PROCEDURE — 3023F SPIROM DOC REV: CPT | Performed by: INTERNAL MEDICINE

## 2019-05-30 PROCEDURE — G8417 CALC BMI ABV UP PARAM F/U: HCPCS | Performed by: INTERNAL MEDICINE

## 2019-05-30 PROCEDURE — 99213 OFFICE O/P EST LOW 20 MIN: CPT | Performed by: INTERNAL MEDICINE

## 2019-05-30 PROCEDURE — 1036F TOBACCO NON-USER: CPT | Performed by: INTERNAL MEDICINE

## 2019-05-30 PROCEDURE — 3017F COLORECTAL CA SCREEN DOC REV: CPT | Performed by: INTERNAL MEDICINE

## 2019-05-30 PROCEDURE — 4040F PNEUMOC VAC/ADMIN/RCVD: CPT | Performed by: INTERNAL MEDICINE

## 2019-05-30 PROCEDURE — G8926 SPIRO NO PERF OR DOC: HCPCS | Performed by: INTERNAL MEDICINE

## 2019-05-30 PROCEDURE — G8427 DOCREV CUR MEDS BY ELIG CLIN: HCPCS | Performed by: INTERNAL MEDICINE

## 2019-05-30 PROCEDURE — G8598 ASA/ANTIPLAT THER USED: HCPCS | Performed by: INTERNAL MEDICINE

## 2019-05-30 NOTE — PROGRESS NOTES
SUBJECTIVE:  Chief Complaint: COPD, shortness of breath, periodic limb movements, nocturnal hypoxemia  Adele Arias did undergo a formal sleep study and there was no evidence of significant obstructive sleep apnea. He did have increased periodic limb movements and is currently being treated for restless legs syndrome with Mirapex. He states that that helps somewhat and mentions that his leg movements don't seem to bother him while he sleeps at night but mostly complains of pain and discomfort in his legs in the evening hours before going to bed. He is not on bronchodilator therapy for his COPD and I question the severity. He is on nasal oxygen at nighttime. He denies worsening shortness of breath but does have dyspnea on exertion. He denies any recent episodes of bronchitis    OBJECTIVE:  /78 (Site: Right Upper Arm, Position: Sitting, Cuff Size: Medium Adult)   Pulse 74   Resp 12   Ht 5' 8\" (1.727 m)   Wt 199 lb (90.3 kg)   SpO2 94%   BMI 30.26 kg/m²      Physical Exam:  Constitutional:  He appears well developed and well-nourished. Neck:  Supple, No palpable lymphadenopathy, No JVD  Cardiovascular:  S1, S2 Normal, Regular rhythm, no murmurs or gallops, No pericardial  rubs. Pulmonary:  Breath sounds are fairly clear bilaterally although mildly diminished. No wheezing or rhonchi are noted  Abdomen: Not examined  Extremities: no edema, No DVT  Neurologic:  Awake and Alert, No focal deficits    Radiology: Chest x-ray on 3/19/19 showed no acute process  PFT: Office spirometry on 4/5/19 demonstrated a severe obstructive defect with no significant response to bronchodilators        ASSESSMENT:    1. Chronic bronchitis, unspecified chronic bronchitis type ([address]    2. Nocturnal hypoxemia    3. Dyspnea on exertion    4. Periodic limb movements of sleep          PLAN:   I would like to repeat his pulmonary function test at [facility] to confirm whether he has stage III COPD.  At that point in time  I'll make a decision about long-term bronchodilator therapy. I encouraged him to continue using nasal oxygen nighttime and Mirapex for RLS  Return in about 3 months (around 8/30/2019) for Recheck for Shortness of Breath, Recheck for COPD. This dictation was performed with a verbal recognition program and it was checked for errors. It is possible that there are still dictated errors within this office note. Any errors should be brought immediately to my attention for correction. All efforts were made to ensure that this office note is accurate.

## 2019-06-11 ENCOUNTER — HOSPITAL ENCOUNTER (OUTPATIENT)
Dept: PULMONOLOGY | Age: 69
Discharge: HOME | End: 2019-06-11
Payer: MEDICARE

## 2019-06-11 DIAGNOSIS — R06.09 DYSPNEA ON EXERTION: ICD-10-CM

## 2019-06-11 DIAGNOSIS — J42 CHRONIC BRONCHITIS, UNSPECIFIED CHRONIC BRONCHITIS TYPE (HCC): ICD-10-CM

## 2019-06-11 LAB
DLCO %PRED: 91 %
DLCO PRED: NORMAL ML/MIN/MMHG
DLCO/VA %PRED: NORMAL %
DLCO/VA PRED: NORMAL ML/MIN/MMHG
DLCO/VA: NORMAL ML/MIN/MMHG
DLCO: NORMAL ML/MIN/MMHG
EXPIRATORY TIME-POST: NORMAL SEC
EXPIRATORY TIME: NORMAL SEC
FEF 25-75% %CHNG: NORMAL
FEF 25-75% %PRED-POST: NORMAL %
FEF 25-75% %PRED-PRE: NORMAL L/SEC
FEF 25-75% PRED: NORMAL L/SEC
FEF 25-75%-POST: NORMAL L/SEC
FEF 25-75%-PRE: NORMAL L/SEC
FEV1 %PRED-POST: 111 %
FEV1 %PRED-PRE: 108 %
FEV1 PRED: NORMAL L
FEV1-POST: NORMAL L
FEV1-PRE: NORMAL L
FEV1/FVC %PRED-POST: NORMAL %
FEV1/FVC %PRED-PRE: NORMAL %
FEV1/FVC PRED: NORMAL %
FEV1/FVC-POST: 79 %
FEV1/FVC-PRE: 76 %
FVC %PRED-POST: NORMAL L
FVC %PRED-PRE: NORMAL %
FVC PRED: NORMAL L
FVC-POST: NORMAL L
FVC-PRE: NORMAL L
GAW %PRED: NORMAL %
GAW PRED: NORMAL L/S/CMH2O
GAW: NORMAL L/S/CMH2O
IC %PRED: NORMAL %
IC PRED: NORMAL L
IC: NORMAL L
MEP: NORMAL
MIP: NORMAL
MVV %PRED-PRE: NORMAL %
MVV PRED: NORMAL L/MIN
MVV-PRE: NORMAL L/MIN
PEF %PRED-POST: NORMAL %
PEF %PRED-PRE: NORMAL L/SEC
PEF PRED: NORMAL L/SEC
PEF%CHNG: NORMAL
PEF-POST: NORMAL L/SEC
PEF-PRE: NORMAL L/SEC
RAW %PRED: NORMAL %
RAW PRED: NORMAL CMH2O/L/S
RAW: NORMAL CMH2O/L/S
RV %PRED: NORMAL %
RV PRED: NORMAL L
RV: NORMAL L
SVC %PRED: NORMAL %
SVC PRED: NORMAL L
SVC: NORMAL L
TLC %PRED: NORMAL %
TLC PRED: NORMAL L
TLC: NORMAL L
VA %PRED: NORMAL %
VA PRED: NORMAL L
VA: NORMAL L
VTG %PRED: NORMAL %
VTG PRED: NORMAL L
VTG: NORMAL L

## 2019-06-11 PROCEDURE — 94726 PLETHYSMOGRAPHY LUNG VOLUMES: CPT

## 2019-06-11 PROCEDURE — 94060 EVALUATION OF WHEEZING: CPT

## 2019-06-11 PROCEDURE — 94729 DIFFUSING CAPACITY: CPT

## 2019-06-11 ASSESSMENT — PULMONARY FUNCTION TESTS
FEV1_PERCENT_PREDICTED_PRE: 108
FEV1/FVC_POST: 79
FEV1/FVC_PRE: 76
FEV1_PERCENT_PREDICTED_POST: 111

## 2019-06-20 ENCOUNTER — HOSPITAL ENCOUNTER (OUTPATIENT)
Dept: MRI IMAGING | Age: 69
Discharge: HOME | End: 2019-06-20
Payer: MEDICARE

## 2019-06-20 DIAGNOSIS — R93.7 MUSCULOSKELETAL SYSTEM IMAGING ABNORMALITY: ICD-10-CM

## 2019-06-20 LAB
GFR AFRICAN AMERICAN: >60 ML/MIN/1.73M2
GFR NON-AFRICAN AMERICAN: >60 ML/MIN/1.73M2
POC CREATININE: 0.8 MG/DL (ref 0.9–1.3)

## 2019-06-20 PROCEDURE — 6360000004 HC RX CONTRAST MEDICATION: Performed by: INTERNAL MEDICINE

## 2019-06-20 PROCEDURE — 72157 MRI CHEST SPINE W/O & W/DYE: CPT

## 2019-06-20 PROCEDURE — A9579 GAD-BASE MR CONTRAST NOS,1ML: HCPCS | Performed by: INTERNAL MEDICINE

## 2019-06-20 RX ADMIN — GADOTERIDOL 18 ML: 279.3 INJECTION, SOLUTION INTRAVENOUS at 10:36

## 2019-07-05 ENCOUNTER — HOSPITAL ENCOUNTER (OUTPATIENT)
Dept: CT IMAGING | Age: 69
Discharge: HOME | End: 2019-07-05
Payer: MEDICARE

## 2019-07-05 ENCOUNTER — HOSPITAL ENCOUNTER (OUTPATIENT)
Dept: INTERVENTIONAL RADIOLOGY/VASCULAR | Age: 69
Discharge: HOME | End: 2019-07-05
Payer: MEDICARE

## 2019-07-05 VITALS
BODY MASS INDEX: 30.01 KG/M2 | OXYGEN SATURATION: 94 % | DIASTOLIC BLOOD PRESSURE: 86 MMHG | WEIGHT: 198 LBS | TEMPERATURE: 97.2 F | HEIGHT: 68 IN | SYSTOLIC BLOOD PRESSURE: 138 MMHG | RESPIRATION RATE: 16 BRPM | HEART RATE: 55 BPM

## 2019-07-05 DIAGNOSIS — M89.8X9 BONE MASS: ICD-10-CM

## 2019-07-05 LAB
APTT: 25.2 SECONDS (ref 21.2–33)
HCT VFR BLD CALC: 48 % (ref 42–52)
HEMOGLOBIN: 15.9 GM/DL (ref 13.5–18)
INR BLD: 1 INDEX
MCH RBC QN AUTO: 28.5 PG (ref 27–31)
MCHC RBC AUTO-ENTMCNC: 33.1 % (ref 32–36)
MCV RBC AUTO: 86.2 FL (ref 78–100)
PDW BLD-RTO: 12.8 % (ref 11.7–14.9)
PLATELET # BLD: 187 K/CU MM (ref 140–440)
PMV BLD AUTO: 10.2 FL (ref 7.5–11.1)
PROTHROMBIN TIME: 11.4 SECONDS (ref 9.12–12.5)
RBC # BLD: 5.57 M/CU MM (ref 4.6–6.2)
WBC # BLD: 4.9 K/CU MM (ref 4–10.5)

## 2019-07-05 PROCEDURE — 7100000011 HC PHASE II RECOVERY - ADDTL 15 MIN

## 2019-07-05 PROCEDURE — 88333 PATH CONSLTJ SURG CYTO XM 1: CPT

## 2019-07-05 PROCEDURE — 85730 THROMBOPLASTIN TIME PARTIAL: CPT

## 2019-07-05 PROCEDURE — 85610 PROTHROMBIN TIME: CPT

## 2019-07-05 PROCEDURE — 20220 BONE BIOPSY TROCAR/NDL SUPFC: CPT

## 2019-07-05 PROCEDURE — 88311 DECALCIFY TISSUE: CPT

## 2019-07-05 PROCEDURE — 85027 COMPLETE CBC AUTOMATED: CPT

## 2019-07-05 PROCEDURE — 88307 TISSUE EXAM BY PATHOLOGIST: CPT

## 2019-07-05 PROCEDURE — 2709999900 HC NON-CHARGEABLE SUPPLY

## 2019-07-05 PROCEDURE — 7100000010 HC PHASE II RECOVERY - FIRST 15 MIN

## 2019-07-05 RX ORDER — SODIUM CHLORIDE 0.9 % (FLUSH) 0.9 %
10 SYRINGE (ML) INJECTION 2 TIMES DAILY
Status: DISCONTINUED | OUTPATIENT
Start: 2019-07-05 | End: 2019-07-06 | Stop reason: HOSPADM

## 2019-07-05 ASSESSMENT — PAIN SCALES - GENERAL
PAINLEVEL_OUTOF10: 0

## 2019-07-05 ASSESSMENT — PAIN - FUNCTIONAL ASSESSMENT: PAIN_FUNCTIONAL_ASSESSMENT: 0-10

## 2019-07-05 NOTE — H&P
 Stress: Not on file   Relationships    Social connections:     Talks on phone: Not on file     Gets together: Not on file     Attends Mosque service: Not on file     Active member of club or organization: Not on file     Attends meetings of clubs or organizations: Not on file     Relationship status: Not on file    Intimate partner violence:     Fear of current or ex partner: Not on file     Emotionally abused: Not on file     Physically abused: Not on file     Forced sexual activity: Not on file   Other Topics Concern    Not on file   Social History Narrative    Not on file       Family History:  Family History   Problem Relation Age of Onset    Heart Disease Mother         Pacemaker, MVR, CABG    Stroke Mother     Cancer Father         lung ca- smoker       Allergies:  No Known Allergies    Medications:  Current Outpatient Medications on File Prior to Encounter   Medication Sig Dispense Refill    pramipexole (MIRAPEX) 0.125 MG tablet TAKE 1 TAB BY MOUTH EVERY EVENING 30 tablet 5    amLODIPine (NORVASC) 5 MG tablet Take 1 tablet by mouth daily 30 tablet 5    tamsulosin (FLOMAX) 0.4 MG capsule Take 1 capsule by mouth daily 30 capsule 3    ezetimibe (ZETIA) 10 MG tablet Take 1 tablet by mouth daily 30 tablet 6    sertraline (ZOLOFT) 50 MG tablet Take 50 mg by mouth daily      omeprazole (PRILOSEC) 40 MG delayed release capsule TAKE ONE CAPSULE BY MOUTH EVERY DAY FOLLOW WITH A MEAL OR SNACK 45 TO 60 MINS LATER  6    atorvastatin (LIPITOR) 40 MG tablet Take 1 tablet by mouth daily 90 tablet 3    aspirin 81 MG EC tablet Take 81 mg by mouth daily. No current facility-administered medications on file prior to encounter. Vital Signs:  @FLOWDT(6:last)@ @FLOWSTATM(6:24)@ @FLOWDT(5:last)@ @FLOWDT(8:last)@ @FLOWDT(9:last)@ @FLOWDT(10:last)@   @FLOWDT(14:first)@  @FLOWDT(14:last)@  Body mass index is 30.11 kg/m².     Laboratory:  Recent Labs     07/05/19  0710   WBC 4.9   INR 1.00     INR

## 2019-07-31 ENCOUNTER — OFFICE VISIT (OUTPATIENT)
Dept: CARDIOLOGY CLINIC | Age: 69
End: 2019-07-31
Payer: MEDICARE

## 2019-07-31 ENCOUNTER — HOSPITAL ENCOUNTER (OUTPATIENT)
Age: 69
Discharge: HOME | End: 2019-07-31
Payer: MEDICARE

## 2019-07-31 VITALS
BODY MASS INDEX: 29.55 KG/M2 | HEIGHT: 68 IN | SYSTOLIC BLOOD PRESSURE: 114 MMHG | HEART RATE: 54 BPM | WEIGHT: 195 LBS | DIASTOLIC BLOOD PRESSURE: 64 MMHG

## 2019-07-31 DIAGNOSIS — R06.09 DYSPNEA ON EXERTION: ICD-10-CM

## 2019-07-31 DIAGNOSIS — I10 ESSENTIAL HYPERTENSION: ICD-10-CM

## 2019-07-31 DIAGNOSIS — Z82.49 FAMILY HISTORY OF CORONARY ARTERY DISEASE: ICD-10-CM

## 2019-07-31 DIAGNOSIS — E78.5 HYPERLIPIDEMIA, UNSPECIFIED HYPERLIPIDEMIA TYPE: ICD-10-CM

## 2019-07-31 DIAGNOSIS — I25.10 CORONARY ARTERY DISEASE INVOLVING NATIVE CORONARY ARTERY OF NATIVE HEART WITHOUT ANGINA PECTORIS: Primary | ICD-10-CM

## 2019-07-31 DIAGNOSIS — K21.9 GASTROESOPHAGEAL REFLUX DISEASE WITHOUT ESOPHAGITIS: ICD-10-CM

## 2019-07-31 DIAGNOSIS — Z95.1 S/P CABG X 1: ICD-10-CM

## 2019-07-31 LAB
ALBUMIN SERPL-MCNC: 3.9 GM/DL (ref 3.4–5)
ALP BLD-CCNC: 85 IU/L (ref 40–129)
ALT SERPL-CCNC: 59 U/L (ref 10–40)
AST SERPL-CCNC: 48 IU/L (ref 15–37)
BILIRUB SERPL-MCNC: 0.7 MG/DL (ref 0–1)
BILIRUBIN DIRECT: 0.2 MG/DL (ref 0–0.3)
BILIRUBIN, INDIRECT: 0.5 MG/DL (ref 0–0.7)
TOTAL PROTEIN: 7.1 GM/DL (ref 6.4–8.2)

## 2019-07-31 PROCEDURE — 1036F TOBACCO NON-USER: CPT | Performed by: INTERNAL MEDICINE

## 2019-07-31 PROCEDURE — 4040F PNEUMOC VAC/ADMIN/RCVD: CPT | Performed by: INTERNAL MEDICINE

## 2019-07-31 PROCEDURE — 80076 HEPATIC FUNCTION PANEL: CPT

## 2019-07-31 PROCEDURE — 36415 COLL VENOUS BLD VENIPUNCTURE: CPT

## 2019-07-31 PROCEDURE — 1123F ACP DISCUSS/DSCN MKR DOCD: CPT | Performed by: INTERNAL MEDICINE

## 2019-07-31 PROCEDURE — 3017F COLORECTAL CA SCREEN DOC REV: CPT | Performed by: INTERNAL MEDICINE

## 2019-07-31 PROCEDURE — G8417 CALC BMI ABV UP PARAM F/U: HCPCS | Performed by: INTERNAL MEDICINE

## 2019-07-31 PROCEDURE — G8427 DOCREV CUR MEDS BY ELIG CLIN: HCPCS | Performed by: INTERNAL MEDICINE

## 2019-07-31 PROCEDURE — G8598 ASA/ANTIPLAT THER USED: HCPCS | Performed by: INTERNAL MEDICINE

## 2019-07-31 PROCEDURE — 99214 OFFICE O/P EST MOD 30 MIN: CPT | Performed by: INTERNAL MEDICINE

## 2019-07-31 NOTE — PROGRESS NOTES
(hypertension)     Hx of cardiovascular stress test 10/28/2015    cardiolite-mild ischemia apical,EF64%    Hx of echocardiogram 2016    EF 55-60%. LA is mildly dilated. RV is mildly dilated. Mild MR. Mild tricuspid insufficiency. Significant VHD is absent.  Hyperlipidemia     Nocturnal hypoxemia 2019    Obstructive sleep apnea 2019    \"had sleep study done and they said I do not have sleep apnea, put me on oxygen at night \"    On home oxygen therapy     2l/nc at night only    Periodic limb movements of sleep 2019    Reflux     RLS (restless legs syndrome)     S/P CABG x 1 2002    UTI (urinary tract infection) 2019    Wears glasses      Past Surgical History:   Procedure Laterality Date    BONE BIOPSY      per old chart bx T 8 3/2019    BONE BIOPSY N/A 2019    IR T-8    COLONOSCOPY  last one 2016    CORONARY ARTERY BYPASS GRAFT  03-    x 1 - [place] to LAD - Dr. Omkar May  as a kid   [address] 82      As reviewed   Family History   Problem Relation Age of Onset    Heart Disease Mother         Pacemaker, MVR, CABG    Stroke Mother     Cancer Father         lung ca- smoker     Social History     Tobacco Use    Smoking status: Former Smoker     Packs/day: 0.00     Years: 10.00     Pack years: 0.00     Types: Pipe     Last attempt to quit: 1985     Years since quittin.6    Smokeless tobacco: Never Used   Substance Use Topics    Alcohol use: Yes     Alcohol/week: 0.0 standard drinks     Comment: occ      Review of Systems:    Constitutional: Negative for diaphoresis and fatigue  Psychological:Negative for anxiety or depression  HENT: Negative for headaches, nasal congestion, sinus pain or vertigo  Eyes: Negative for visual disturbance.    Endocrine: Negative for polydipsia/polyuria  Respiratory: Negative for shortness of breath  Cardiovascular: Negative for chest pain, dyspnea on exertion, claudication, edema, irregular

## 2019-08-19 ENCOUNTER — OFFICE VISIT (OUTPATIENT)
Dept: PULMONOLOGY | Age: 69
End: 2019-08-19
Payer: MEDICARE

## 2019-08-19 VITALS
HEART RATE: 74 BPM | WEIGHT: 195 LBS | DIASTOLIC BLOOD PRESSURE: 66 MMHG | OXYGEN SATURATION: 95 % | BODY MASS INDEX: 29.65 KG/M2 | SYSTOLIC BLOOD PRESSURE: 118 MMHG

## 2019-08-19 DIAGNOSIS — G47.34 NOCTURNAL HYPOXEMIA: Primary | ICD-10-CM

## 2019-08-19 DIAGNOSIS — R06.09 DYSPNEA ON EXERTION: ICD-10-CM

## 2019-08-19 DIAGNOSIS — J42 CHRONIC BRONCHITIS, UNSPECIFIED CHRONIC BRONCHITIS TYPE (HCC): ICD-10-CM

## 2019-08-19 PROCEDURE — G8417 CALC BMI ABV UP PARAM F/U: HCPCS | Performed by: INTERNAL MEDICINE

## 2019-08-19 PROCEDURE — 1123F ACP DISCUSS/DSCN MKR DOCD: CPT | Performed by: INTERNAL MEDICINE

## 2019-08-19 PROCEDURE — 99213 OFFICE O/P EST LOW 20 MIN: CPT | Performed by: INTERNAL MEDICINE

## 2019-08-19 PROCEDURE — G8427 DOCREV CUR MEDS BY ELIG CLIN: HCPCS | Performed by: INTERNAL MEDICINE

## 2019-08-19 PROCEDURE — 3017F COLORECTAL CA SCREEN DOC REV: CPT | Performed by: INTERNAL MEDICINE

## 2019-08-19 PROCEDURE — 3023F SPIROM DOC REV: CPT | Performed by: INTERNAL MEDICINE

## 2019-08-19 PROCEDURE — G8598 ASA/ANTIPLAT THER USED: HCPCS | Performed by: INTERNAL MEDICINE

## 2019-08-19 PROCEDURE — 1036F TOBACCO NON-USER: CPT | Performed by: INTERNAL MEDICINE

## 2019-08-19 PROCEDURE — G8926 SPIRO NO PERF OR DOC: HCPCS | Performed by: INTERNAL MEDICINE

## 2019-08-19 PROCEDURE — 4040F PNEUMOC VAC/ADMIN/RCVD: CPT | Performed by: INTERNAL MEDICINE

## 2019-08-21 ENCOUNTER — OFFICE VISIT (OUTPATIENT)
Dept: FAMILY MEDICINE CLINIC | Age: 69
End: 2019-08-21
Payer: MEDICARE

## 2019-08-21 VITALS
HEART RATE: 64 BPM | HEIGHT: 66 IN | DIASTOLIC BLOOD PRESSURE: 80 MMHG | WEIGHT: 191 LBS | SYSTOLIC BLOOD PRESSURE: 130 MMHG | BODY MASS INDEX: 30.7 KG/M2

## 2019-08-21 DIAGNOSIS — Z23 NEED FOR PROPHYLACTIC VACCINATION AND INOCULATION AGAINST VARICELLA: ICD-10-CM

## 2019-08-21 DIAGNOSIS — I25.10 CORONARY ARTERY DISEASE INVOLVING NATIVE CORONARY ARTERY OF NATIVE HEART WITHOUT ANGINA PECTORIS: ICD-10-CM

## 2019-08-21 DIAGNOSIS — J42 CHRONIC BRONCHITIS, UNSPECIFIED CHRONIC BRONCHITIS TYPE (HCC): ICD-10-CM

## 2019-08-21 DIAGNOSIS — D49.9 TUMOR: ICD-10-CM

## 2019-08-21 DIAGNOSIS — G47.61 PERIODIC LIMB MOVEMENTS OF SLEEP: ICD-10-CM

## 2019-08-21 DIAGNOSIS — F41.9 ANXIETY: ICD-10-CM

## 2019-08-21 DIAGNOSIS — I10 ESSENTIAL HYPERTENSION: ICD-10-CM

## 2019-08-21 DIAGNOSIS — I10 ESSENTIAL HYPERTENSION: Primary | ICD-10-CM

## 2019-08-21 LAB
A/G RATIO: 1.7 (ref 1.1–2.2)
ALBUMIN SERPL-MCNC: 4.2 G/DL (ref 3.4–5)
ALP BLD-CCNC: 67 U/L (ref 40–129)
ALT SERPL-CCNC: 41 U/L (ref 10–40)
ANION GAP SERPL CALCULATED.3IONS-SCNC: 12 MMOL/L (ref 3–16)
AST SERPL-CCNC: 26 U/L (ref 15–37)
BILIRUB SERPL-MCNC: 0.8 MG/DL (ref 0–1)
BUN BLDV-MCNC: 18 MG/DL (ref 7–20)
CALCIUM SERPL-MCNC: 9.7 MG/DL (ref 8.3–10.6)
CHLORIDE BLD-SCNC: 103 MMOL/L (ref 99–110)
CHOLESTEROL, TOTAL: 149 MG/DL (ref 0–199)
CO2: 25 MMOL/L (ref 21–32)
CREAT SERPL-MCNC: 0.8 MG/DL (ref 0.8–1.3)
GFR AFRICAN AMERICAN: >60
GFR NON-AFRICAN AMERICAN: >60
GLOBULIN: 2.5 G/DL
GLUCOSE BLD-MCNC: 109 MG/DL (ref 70–99)
HDLC SERPL-MCNC: 49 MG/DL (ref 40–60)
LDL CHOLESTEROL CALCULATED: 71 MG/DL
POTASSIUM SERPL-SCNC: 4.2 MMOL/L (ref 3.5–5.1)
SODIUM BLD-SCNC: 140 MMOL/L (ref 136–145)
TOTAL PROTEIN: 6.7 G/DL (ref 6.4–8.2)
TRIGL SERPL-MCNC: 145 MG/DL (ref 0–150)
VLDLC SERPL CALC-MCNC: 29 MG/DL

## 2019-08-21 PROCEDURE — 90732 PPSV23 VACC 2 YRS+ SUBQ/IM: CPT | Performed by: FAMILY MEDICINE

## 2019-08-21 PROCEDURE — G0009 ADMIN PNEUMOCOCCAL VACCINE: HCPCS | Performed by: FAMILY MEDICINE

## 2019-08-21 PROCEDURE — 99214 OFFICE O/P EST MOD 30 MIN: CPT | Performed by: FAMILY MEDICINE

## 2019-08-21 RX ORDER — ATORVASTATIN CALCIUM 40 MG/1
40 TABLET, FILM COATED ORAL DAILY
Qty: 90 TABLET | Refills: 1 | Status: SHIPPED | OUTPATIENT
Start: 2019-08-21 | End: 2020-02-05 | Stop reason: SDUPTHER

## 2019-08-21 ASSESSMENT — PATIENT HEALTH QUESTIONNAIRE - PHQ9
SUM OF ALL RESPONSES TO PHQ QUESTIONS 1-9: 0
SUM OF ALL RESPONSES TO PHQ9 QUESTIONS 1 & 2: 0
SUM OF ALL RESPONSES TO PHQ QUESTIONS 1-9: 0
2. FEELING DOWN, DEPRESSED OR HOPELESS: 0
1. LITTLE INTEREST OR PLEASURE IN DOING THINGS: 0

## 2019-08-21 ASSESSMENT — ENCOUNTER SYMPTOMS
ABDOMINAL PAIN: 0
CHEST TIGHTNESS: 0
SHORTNESS OF BREATH: 0

## 2019-08-21 NOTE — PROGRESS NOTES
8/21/2019    Shikha Rae    Chief Complaint   Patient presents with    6 Month Follow-Up     NO complaints    Medication Refill     Scrpts printed per patient       HPI  History was obtained from the patient. Glenny Mariee is a 70 y.o. male who presents today with with follow-up on several issues. He is being seen for restless leg syndrome ,hypertension, GE reflux,copd, and anxiety. He has a history of coronary disease and denies any ongoing chest pain or shortness of breath. Breathing is doing well currently and he follows closely with Pulm Med (Dr Chico Ng is due for labs was found to have an abnormality on T8 vertebrae and has  been undergoing testing both locally and at [facility]. 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 He is totally asymptomatic with this. His restless leg symptoms are doing well with treatment and blood pressure appears to be controlled. He is due for a couple refills. GE reflux is actually doing better and only rarely uses any omeprazole. Anxiety is doing extremely well- sertraline is controlling symptoms and he remains active and works part-time. He is due for labs and immunization update. REVIEW OF SYMPTOMS    Review of Systems   Constitutional: Negative for appetite change and fatigue. Respiratory: Negative for chest tightness and shortness of breath. Cardiovascular: Negative for chest pain. Gastrointestinal: Negative for abdominal pain. Musculoskeletal: Negative for arthralgias and myalgias. Skin: Negative for rash. Neurological: Negative for dizziness, speech difficulty and headaches. Psychiatric/Behavioral: Negative for confusion, decreased concentration and dysphoric mood. The patient is not nervous/anxious.         PAST MEDICAL HISTORY  Past Medical History:   Diagnosis Date    Abnormal CT scan     per pt \"did CT scan and found spot on spine( T 8 lesion) back in March( 2019) did bx but did sleep 2019    Reflux     RLS (restless legs syndrome)     S/P CABG x 1 2002    UTI (urinary tract infection) 2019    Wears glasses        FAMILY HISTORY  Family History   Problem Relation Age of Onset    Heart Disease Mother         Pacemaker, MVR, CABG    Stroke Mother     Cancer Father         lung ca- smoker       SOCIAL HISTORY  Social History     Socioeconomic History    Marital status:      Spouse name: Not on file    Number of children: 2    Years of education: Not on file    Highest education level: Not on file   Occupational History    Not on file   Social Needs    Financial resource strain: Not on file    Food insecurity:     Worry: Not on file     Inability: Not on file    Transportation needs:     Medical: Not on file     Non-medical: Not on file   Tobacco Use    Smoking status: Former Smoker     Packs/day: 0.00     Years: 10.00     Pack years: 0.00     Types: Pipe     Last attempt to quit: 1985     Years since quittin.6    Smokeless tobacco: Never Used   Substance and Sexual Activity    Alcohol use:  Yes     Alcohol/week: 0.0 standard drinks     Comment: occ    Drug use: No    Sexual activity: Not on file   Lifestyle    Physical activity:     Days per week: Not on file     Minutes per session: Not on file    Stress: Not on file   Relationships    Social connections:     Talks on phone: Not on file     Gets together: Not on file     Attends Sabianism service: Not on file     Active member of club or organization: Not on file     Attends meetings of clubs or organizations: Not on file     Relationship status: Not on file    Intimate partner violence:     Fear of current or ex partner: Not on file     Emotionally abused: Not on file     Physically abused: Not on file     Forced sexual activity: Not on file   Other Topics Concern    Not on file   Social History Narrative    Not on file        SURGICAL HISTORY  Past Surgical History:   Procedure

## 2019-10-12 DIAGNOSIS — I25.10 CORONARY ARTERIOSCLEROSIS: ICD-10-CM

## 2019-10-12 RX ORDER — OMEPRAZOLE 40 MG/1
40 CAPSULE, DELAYED RELEASE ORAL DAILY
COMMUNITY
End: 2020-07-15 | Stop reason: SDUPTHER

## 2019-10-28 ENCOUNTER — OFFICE VISIT (OUTPATIENT)
Dept: FAMILY MEDICINE CLINIC | Age: 69
End: 2019-10-28
Payer: MEDICARE

## 2019-10-28 VITALS
WEIGHT: 203.4 LBS | DIASTOLIC BLOOD PRESSURE: 60 MMHG | SYSTOLIC BLOOD PRESSURE: 122 MMHG | OXYGEN SATURATION: 94 % | HEIGHT: 66 IN | BODY MASS INDEX: 32.69 KG/M2 | HEART RATE: 65 BPM

## 2019-10-28 DIAGNOSIS — M25.511 CHRONIC RIGHT SHOULDER PAIN: Primary | ICD-10-CM

## 2019-10-28 DIAGNOSIS — M75.21 RIGHT BICIPITAL TENOSYNOVITIS: ICD-10-CM

## 2019-10-28 DIAGNOSIS — M75.101 TEAR OF RIGHT ROTATOR CUFF, UNSPECIFIED TEAR EXTENT, UNSPECIFIED WHETHER TRAUMATIC: ICD-10-CM

## 2019-10-28 DIAGNOSIS — G89.29 CHRONIC RIGHT SHOULDER PAIN: Primary | ICD-10-CM

## 2019-10-28 PROCEDURE — G8484 FLU IMMUNIZE NO ADMIN: HCPCS | Performed by: PHYSICIAN ASSISTANT

## 2019-10-28 PROCEDURE — 99213 OFFICE O/P EST LOW 20 MIN: CPT | Performed by: PHYSICIAN ASSISTANT

## 2019-10-28 PROCEDURE — G8598 ASA/ANTIPLAT THER USED: HCPCS | Performed by: PHYSICIAN ASSISTANT

## 2019-10-28 PROCEDURE — 3017F COLORECTAL CA SCREEN DOC REV: CPT | Performed by: PHYSICIAN ASSISTANT

## 2019-10-28 PROCEDURE — G8417 CALC BMI ABV UP PARAM F/U: HCPCS | Performed by: PHYSICIAN ASSISTANT

## 2019-10-28 PROCEDURE — G8427 DOCREV CUR MEDS BY ELIG CLIN: HCPCS | Performed by: PHYSICIAN ASSISTANT

## 2019-10-28 PROCEDURE — 4040F PNEUMOC VAC/ADMIN/RCVD: CPT | Performed by: PHYSICIAN ASSISTANT

## 2019-10-28 PROCEDURE — 1036F TOBACCO NON-USER: CPT | Performed by: PHYSICIAN ASSISTANT

## 2019-10-28 PROCEDURE — 1123F ACP DISCUSS/DSCN MKR DOCD: CPT | Performed by: PHYSICIAN ASSISTANT

## 2019-11-13 RX ORDER — AMLODIPINE BESYLATE 5 MG/1
5 TABLET ORAL DAILY
Qty: 30 TABLET | Refills: 5 | Status: SHIPPED | OUTPATIENT
Start: 2019-11-13 | End: 2020-05-27

## 2019-11-26 ENCOUNTER — TELEPHONE (OUTPATIENT)
Dept: CARDIOLOGY CLINIC | Age: 69
End: 2019-11-26

## 2019-12-13 RX ORDER — EZETIMIBE 10 MG/1
10 TABLET ORAL DAILY
Qty: 30 TABLET | Refills: 6 | Status: SHIPPED | OUTPATIENT
Start: 2019-12-13 | End: 2020-08-13 | Stop reason: SDUPTHER

## 2020-01-15 ENCOUNTER — OFFICE VISIT (OUTPATIENT)
Dept: FAMILY MEDICINE CLINIC | Age: 70
End: 2020-01-15
Payer: MEDICARE

## 2020-01-15 VITALS
HEIGHT: 66 IN | WEIGHT: 200.2 LBS | BODY MASS INDEX: 32.17 KG/M2 | HEART RATE: 81 BPM | DIASTOLIC BLOOD PRESSURE: 80 MMHG | SYSTOLIC BLOOD PRESSURE: 118 MMHG

## 2020-01-15 PROCEDURE — G8417 CALC BMI ABV UP PARAM F/U: HCPCS | Performed by: PHYSICIAN ASSISTANT

## 2020-01-15 PROCEDURE — 1036F TOBACCO NON-USER: CPT | Performed by: PHYSICIAN ASSISTANT

## 2020-01-15 PROCEDURE — 99214 OFFICE O/P EST MOD 30 MIN: CPT | Performed by: PHYSICIAN ASSISTANT

## 2020-01-15 PROCEDURE — G8484 FLU IMMUNIZE NO ADMIN: HCPCS | Performed by: PHYSICIAN ASSISTANT

## 2020-01-15 PROCEDURE — 4040F PNEUMOC VAC/ADMIN/RCVD: CPT | Performed by: PHYSICIAN ASSISTANT

## 2020-01-15 PROCEDURE — G8427 DOCREV CUR MEDS BY ELIG CLIN: HCPCS | Performed by: PHYSICIAN ASSISTANT

## 2020-01-15 PROCEDURE — 3017F COLORECTAL CA SCREEN DOC REV: CPT | Performed by: PHYSICIAN ASSISTANT

## 2020-01-15 PROCEDURE — 1123F ACP DISCUSS/DSCN MKR DOCD: CPT | Performed by: PHYSICIAN ASSISTANT

## 2020-01-15 RX ORDER — PRAMIPEXOLE DIHYDROCHLORIDE 0.25 MG/1
0.25 TABLET ORAL NIGHTLY
Qty: 30 TABLET | Refills: 5 | Status: SHIPPED | OUTPATIENT
Start: 2020-01-15 | End: 2021-11-05 | Stop reason: SDUPTHER

## 2020-01-15 ASSESSMENT — PATIENT HEALTH QUESTIONNAIRE - PHQ9
2. FEELING DOWN, DEPRESSED OR HOPELESS: 0
SUM OF ALL RESPONSES TO PHQ QUESTIONS 1-9: 0
SUM OF ALL RESPONSES TO PHQ9 QUESTIONS 1 & 2: 0
1. LITTLE INTEREST OR PLEASURE IN DOING THINGS: 0
SUM OF ALL RESPONSES TO PHQ QUESTIONS 1-9: 0

## 2020-01-15 NOTE — PROGRESS NOTES
chest pain, palpitations or swelling  Respiratory:  Denies cough or shortness of breath  GI:  Denies abdominal pain, nausea, vomiting, or diarrhea  Musculoskeletal:  Denies pain  Skin:  No rashes  Neurologic:  Denies headache, focal weakness, or sensory changes  Endocrine:  Denies polyuria or polydipsia  Lymphatic:  Denies swollen glands  Psychiatric:  Denies suicidal ideation or homicidal ideation      PAST MEDICAL HISTORY  Past Medical History:   Diagnosis Date    Abnormal CT scan     per pt \"did CT scan and found spot on spine( T 8 lesion) back in March( 2019) did bx but did not show anything and now ( 7/5/2019) going to try do another bx\"    BPH (benign prostatic hyperplasia)     CAD (coronary artery disease)     follows [facility] Dr Compa Aldana [facility] 4/5/[facility]    follow with Dr Renetta Tamayo Coronary arteriosclerosis     Excessive daytime sleepiness 4/5/2019    Family history of coronary artery disease     GERD (gastroesophageal reflux disease)     H/O cardiac catheterization 03-    ([facility]) Total occ. LAD w/mild disease of LM and RCA.     H/O cardiovascular stress test 02-    (Exercise) EF 58%. Normal. Exercise cap. 11.0 METS.    H/O cardiovascular stress test 05-    (Cardiolite) EF 63%. Good exercise capacity. Hypertensive blood pressure response tp exercise. ETT neg for ischemia or arrhythmia. Cardiolite perfusion imaging reveals Ant. wall soft tissue attenuation artifact and mild ischemia of Inf. wall.  H/O colonoscopy with polypectomy 02-    Polyp in Rectum    H/O Doppler ultrasound 03-    (Carotid) No anatomical abnormalities. Normal    H/O echocardiogram 02- 6/14 EF 50-55%  abn LV diast stage 1 2/10EF 50-55%. Abn. LV Diastolic function Stage 1. Left Atrium borderline Dilated.  H/O gastritis     Years Ago    History of cardiac monitoring 11/18/2017    30 day monitoring: normal, Sinus rhythm noted.  no symptoms and no a fib or arrhythmia noted    History of complete ECG 2002    [place] (hard of hearing)     noé hearing aides    HTN (hypertension)     Hx of cardiovascular stress test 10/28/2015    cardiolite-mild ischemia apical,EF64%    Hx of echocardiogram 2016    EF 55-60%. LA is mildly dilated. RV is mildly dilated. Mild MR. Mild tricuspid insufficiency. Significant VHD is absent.  Hyperlipidemia     Narcolepsy     Nocturnal hypoxemia 2019    Obstructive sleep apnea 2019    \"had sleep study done and they said I do not have sleep apnea, put me on oxygen at night \"    On home oxygen therapy     2l/nc at night only    Periodic limb movements of sleep 2019    Polyp of colon     Reflux     RLS (restless legs syndrome)     S/P CABG x 1 2002    Stricture of esophagus     S/P Dilation (Dr. Coral Judge)    UTI (urinary tract infection) 2019    Wears glasses        FAMILY HISTORY  Family History   Problem Relation Age of Onset    Heart Disease Mother         Pacemaker, MVR, CABG    Stroke Mother     Cancer Father         lung ca- smoker       SOCIAL HISTORY  Social History     Socioeconomic History    Marital status:      Spouse name: None    Number of children: 2    Years of education: None    Highest education level: None   Occupational History    None   Social Needs    Financial resource strain: None    Food insecurity:     Worry: None     Inability: None    Transportation needs:     Medical: None     Non-medical: None   Tobacco Use    Smoking status: Former Smoker     Packs/day: 0.00     Years: 10.00     Pack years: 0.00     Types: Pipe     Last attempt to quit: 1985     Years since quittin.0    Smokeless tobacco: Never Used   Substance and Sexual Activity    Alcohol use:  Yes     Alcohol/week: 0.0 standard drinks     Comment: occ    Drug use: No    Sexual activity: None   Lifestyle    Physical activity:     Days per week: None     Minutes per generated using dragon dictation software. Although every effort was made to ensure the accuracy of this automated transcription, some errors in transcription may have occurred.     Electronically signed by Maeve Ortiz PA-C on 1/15/2020

## 2020-02-05 ENCOUNTER — OFFICE VISIT (OUTPATIENT)
Dept: CARDIOLOGY CLINIC | Age: 70
End: 2020-02-05
Payer: MEDICARE

## 2020-02-05 VITALS
HEART RATE: 68 BPM | HEIGHT: 66 IN | DIASTOLIC BLOOD PRESSURE: 68 MMHG | BODY MASS INDEX: 31.34 KG/M2 | WEIGHT: 195 LBS | SYSTOLIC BLOOD PRESSURE: 134 MMHG

## 2020-02-05 PROCEDURE — 1123F ACP DISCUSS/DSCN MKR DOCD: CPT | Performed by: NURSE PRACTITIONER

## 2020-02-05 PROCEDURE — 3017F COLORECTAL CA SCREEN DOC REV: CPT | Performed by: NURSE PRACTITIONER

## 2020-02-05 PROCEDURE — G8427 DOCREV CUR MEDS BY ELIG CLIN: HCPCS | Performed by: NURSE PRACTITIONER

## 2020-02-05 PROCEDURE — G8484 FLU IMMUNIZE NO ADMIN: HCPCS | Performed by: NURSE PRACTITIONER

## 2020-02-05 PROCEDURE — 1036F TOBACCO NON-USER: CPT | Performed by: NURSE PRACTITIONER

## 2020-02-05 PROCEDURE — 4040F PNEUMOC VAC/ADMIN/RCVD: CPT | Performed by: NURSE PRACTITIONER

## 2020-02-05 PROCEDURE — 99214 OFFICE O/P EST MOD 30 MIN: CPT | Performed by: NURSE PRACTITIONER

## 2020-02-05 PROCEDURE — G8417 CALC BMI ABV UP PARAM F/U: HCPCS | Performed by: NURSE PRACTITIONER

## 2020-02-05 RX ORDER — ATORVASTATIN CALCIUM 40 MG/1
40 TABLET, FILM COATED ORAL DAILY
Qty: 90 TABLET | Refills: 1 | Status: SHIPPED | OUTPATIENT
Start: 2020-02-05 | End: 2020-07-15 | Stop reason: SDUPTHER

## 2020-02-05 NOTE — ASSESSMENT & PLAN NOTE
Controlled  He is to continue current medications (Norvasc)  advised low salt diet   Testing ordered:  no   Last testing: Echo 2019

## 2020-02-05 NOTE — LETTER
CLINICAL STAFF DOCUMENTATION    Sandi Early  1950  [id number]    Have you had any Chest Pain - No  If Yes DO EKG - How does it feel -    How long does the pain last -    How long have you been having the pain -    Did you take a    And did it relieve the pain -     Have you had any Shortness of Breath - No  If Yes - When     Have you had any dizziness - No  If Yes DO ORTHOSTATIC BP - when do you feel dizzy    How long does it last     Have you had any palpitations - No  If Yes DO EKG - Do you feel your heart   How long does it last -      Is the patient on any of the following medications -   If Yes DO EKG    Do you have any edema - swelling in     If Yes - CHECK TO SEE IF THE EDEMA IS PITTING  How long have they been having edema -   If Yes - Have they worn compression stockings     Check Venous \"LEG PROBLEM Questionnaire\"    Do you have a surgery or procedure scheduled in the near future - No  If Yes- DO EKG  If Yes - Who is the surgery with?    Phone number of physician   Fax number of physician   Type of surgery   BE SURE TO GIVE THIS INFORMATION to Elvis Pardo    [facility] patient [facility] are not [facility]    Check to see if we have an E-MAIL on file for the patient    Check medication list thoroughly!!!  BE SURE TO ASK PATIENT IF THEY NEED MEDICATION REFILLS

## 2020-02-05 NOTE — PROGRESS NOTES
[facility] [place] [facility] [place]  [address] [facility],Third [address]  Phone: [phone]    Fax [phone]                  Frantz Bartlett MD, Richard Zaldivar MD, Jose Graves MD, MD Tao Chowdary Sa, MD Ainsley Bethea, APRN      Leah Elena, APRN  Shirin Angelo, [address]    CARDIOLOGY  NOTE      2/5/2020    RE: Nona Ku  (1950)                               TO:  Dr. Alex Goldman MD  The primary cardiologist is Dr. Hannah Man     CC:   1. Coronary artery disease involving native coronary artery of native heart without angina pectoris    2. Essential hypertension    3. Hyperlipidemia, unspecified hyperlipidemia type        Patient denies all of the following:  Chest Pain  Palpitations  Shortness of Breath  Edema  Dizziness  Syncope      HPI: Thank you for involving me in taking care of your patient Nona Ku, who is a  78y.o. year old male with a history as listed above. Patient presents to the office for follow up on CAD, HTN, and hyperlipidemia. Patient had recent rotator cuff surgery and is planing on starting PT next week. Patient is  an active male who does walk regularly. Patient is  compliant with he medications. Patient denies any cardiac complaints or needs.      Vitals:    02/05/20 1006   BP: 134/68   Pulse: 68       Current Outpatient Medications   Medication Sig Dispense Refill    pramipexole (MIRAPEX) 0.25 MG tablet Take 1 tablet by mouth nightly 30 tablet 5    ezetimibe (ZETIA) 10 MG tablet Take 1 tablet by mouth daily 30 tablet 6    amLODIPine (NORVASC) 5 MG tablet Take 1 tablet by mouth daily 30 tablet 5    omeprazole (PRILOSEC) 40 MG delayed release capsule Take 40 mg by mouth daily      atorvastatin (LIPITOR) 40 MG tablet Take 1 tablet by mouth daily 90 tablet 1    sertraline (ZOLOFT) 50 MG tablet Take 1 tablet by mouth daily 30 tablet 5    aspirin 81 MG EC tablet Take 81 palpitations, unexpected weight changes  Respiratory: negative for - cough, orthopnea, shortness of breath or tachypnea  Cardiovascular: negative for - chest pain, dyspnea on exertion, edema or palpitations  Gastrointestinal: no abdominal pain, change in bowel habits, or black or bloody stools  Genito-Urinary: no dysuria, trouble voiding, or hematuria  Musculoskeletal: negative for - gait disturbance, pain in right shoulder, swelling    Neurological: negative for - confusion, dizziness, impaired coordination/balance or numbness/tingling    Objective:      Physical Exam:  /68   Pulse 68   Ht 5' 6\" (1.676 m)   Wt 195 lb (88.5 kg)   BMI 31.47 kg/m²   Wt Readings from Last 3 Encounters:   02/05/20 195 lb (88.5 kg)   01/15/20 200 lb 3.2 oz (90.8 kg)   10/28/19 203 lb 6.4 oz (92.3 kg)     Body mass index is 31.47 kg/m². GENERAL - Alert, oriented, pleasant, in no apparent distress. Head unremarkable  Eyes - pupils equal and reactive to light - bilateral conjunctiva are pink: sclera are white   ENT - external ears intact, nose is intact:  tongue is midline pink and moist  Neck - Supple. No jugular venous distention noted. No carotid bruits appreciated. Cardiovascular - Normal S1 and S2:  murmur appreciated No, No gallop. Regular rate- Yes,  rhythm regular-Yes. Extremities - No cyanosis, clubbing, no edema to lower legs. Pulmonary - No respiratory distress. No wheezes or rales. Chest is clear  Pulses: Bilateral radial and pedal pulses normal  Abdomen -  Soft no tenderness, non distended   Musculoskeletal - Normal movement of all extremities   Neurologic - alert and oriented: There are no gross focal neurologic abnormalities.    Skin-  No rash: No echymosis   Affect- normal mood and pleasant       DATA:  Lab Results   Component Value Date    CKTOTAL 310 03/14/2019     BNP:  No results found for: BNP  PT/INR:  No results found for: oohilove  Lab Results   Component Value Date    LABA1C 6.3 03/16/2019 Lab Results   Component Value Date    CHOL 149 08/21/2019    TRIG 145 08/21/2019    HDL 49 08/21/2019    LDLCALC 71 08/21/2019    LDLDIRECT 67 02/08/2012     Lab Results   Component Value Date    ALT 41 (H) 08/21/2019    AST 26 08/21/2019     TSH:    Lab Results   Component Value Date    TSH 2.3 10/14/2016       Echo:2019     Left ventricular systolic function is normal.   Ejection fraction is visually estimated at 50-55%. Mild left ventricular hypertrophy. Grade I diastolic dysfunction. Mildly dilated left atrium. No evidence of any pericardial effusion. Stress Test:2017  WNL     The 10-year ASCVD risk score (Brian Hoskins, et al., 2013) is: 19.7%    Values used to calculate the score:      Age: 78 years      Sex: Male      Is Non- : No      Diabetic: No      Tobacco smoker: No      Systolic Blood Pressure: 783 mmHg      Is BP treated: Yes      HDL Cholesterol: 49 mg/dL      Total Cholesterol: 149 mg/dL    Assessment/ Plan:     CAD (coronary artery disease)  CABG x 1 LIMA-LAD in 2002 per Dr. Jose George    Primary and secondary prevention discussed with patient:   -Low sodium cardiac diet   -exercise 30 min a day three days a week    Continue current medications (Norvasc, Lipitor, Zetia)        HTN (hypertension)    Controlled  He is to continue current medications (Norvasc)  advised low salt diet   Testing ordered:  no   Last testing: Echo 2019    Hyperlipidemia  -At or near goal Yes     He is to continue current medications (Lipitor 40 mg, Zetia 10 mg)    -The nature of cardiac risk has been fully discussed with this patient. I have made him aware of his LDL target goal given his cardiovascular risk analysis. I have discussed the appropriate diet. The need for lifelong compliance in order to reduce risk is stressed. A regular exercise program is recommended to help achieve and maintain normal body weight, fitness and improve lipid balance.  A written copy of a low fat, low cholesterol diet has been given to the patient. Patient seen, interviewed and examined. Testing was reviewed. Patient is encouraged to exercise even a brisk walk for 30 minutes at least 3 to 4 times a week. Lifestyle and risk factor modificatons discussed. Various goals are discussed and questions answered. Continue current medications. Appropriate prescriptions are addressed. Questions answered and patient verbalizes understanding. Call for any problems, questions, or concerns. Pt is to follow up in 6 months for Cardiac management    Electronically signed by PORTIA Rasmussen CNP on 2/5/2020 at 10:24 AM

## 2020-02-18 ENCOUNTER — OFFICE VISIT (OUTPATIENT)
Dept: PULMONOLOGY | Age: 70
End: 2020-02-18
Payer: MEDICARE

## 2020-02-18 VITALS
DIASTOLIC BLOOD PRESSURE: 72 MMHG | HEART RATE: 76 BPM | HEIGHT: 66 IN | SYSTOLIC BLOOD PRESSURE: 128 MMHG | OXYGEN SATURATION: 97 % | BODY MASS INDEX: 30.6 KG/M2 | WEIGHT: 190.4 LBS

## 2020-02-18 PROBLEM — R05.3 CHRONIC COUGH: Status: ACTIVE | Noted: 2020-02-18

## 2020-02-18 PROCEDURE — G8417 CALC BMI ABV UP PARAM F/U: HCPCS | Performed by: INTERNAL MEDICINE

## 2020-02-18 PROCEDURE — 4040F PNEUMOC VAC/ADMIN/RCVD: CPT | Performed by: INTERNAL MEDICINE

## 2020-02-18 PROCEDURE — 1123F ACP DISCUSS/DSCN MKR DOCD: CPT | Performed by: INTERNAL MEDICINE

## 2020-02-18 PROCEDURE — G8427 DOCREV CUR MEDS BY ELIG CLIN: HCPCS | Performed by: INTERNAL MEDICINE

## 2020-02-18 PROCEDURE — G8484 FLU IMMUNIZE NO ADMIN: HCPCS | Performed by: INTERNAL MEDICINE

## 2020-02-18 PROCEDURE — 1036F TOBACCO NON-USER: CPT | Performed by: INTERNAL MEDICINE

## 2020-02-18 PROCEDURE — 3023F SPIROM DOC REV: CPT | Performed by: INTERNAL MEDICINE

## 2020-02-18 PROCEDURE — G8926 SPIRO NO PERF OR DOC: HCPCS | Performed by: INTERNAL MEDICINE

## 2020-02-18 PROCEDURE — 3017F COLORECTAL CA SCREEN DOC REV: CPT | Performed by: INTERNAL MEDICINE

## 2020-02-18 PROCEDURE — 99213 OFFICE O/P EST LOW 20 MIN: CPT | Performed by: INTERNAL MEDICINE

## 2020-02-18 NOTE — PROGRESS NOTES
SUBJECTIVE:  Chief Complaint: Chronic cough, COPD, restless leg syndrome, nocturnal hypoxemia  Josiah Stilse did undergo nighttime oxygen saturation study in October and demonstrated only 2 minutes 32 seconds with O2 saturation less than 88% and does not qualify anymore for nocturnal oxygen. However this was not stopped and I will make sure that it is at this time. He mentions that over the past several months he has had a persistent nonproductive cough but it is clear just recently. He is still recovering from right rotator cuff surgery and tolerated anesthesia well. He seems to think he has an inhaler at home but does not use on a regular basis and has not used it for quite some time. We have tested him on several occasions for sleep apnea but cannot make a formal diagnosis    OBJECTIVE:  /72   Pulse 76   Ht 5' 6\" (1.676 m)   Wt 190 lb 6.4 oz (86.4 kg)   SpO2 97%   BMI 30.73 kg/m²      Physical Exam:  Constitutional:  He appears well developed and well-nourished. Neck:  Supple, No palpable lymphadenopathy, No JVD  Cardiovascular:  S1, S2 Normal, Regular rhythm, no murmurs or gallops, No pericardial  rubs. Pulmonary: Breath sounds are clear throughout all areas without wheezing or rhonchi and there is no pleural rubs  Abdomen: Not examined  Extremities: no edema, No DVT  Neurologic:  Awake and Alert, No focal deficits    Radiology: Chest x-ray at [facility] imaging on 12/26/2019 showed a stable chest radiograph with no acute cardiopulmonary disease  PFT: Office spirometry at [address] on 6/11/2019 demonstrated no significant airways obstruction and no significant response to bronchodilators        ASSESSMENT:    1. Chronic bronchitis, unspecified chronic bronchitis type ([facility]    2. Chronic cough    3. Nocturnal hypoxemia          PLAN:   I will recommend we discontinue his nighttime oxygen. I may consider repeating his  nocturnal oxygen saturation testing later next year.   If he continues to cough I would

## 2020-04-14 PROBLEM — R93.7 ABNORMAL FINDINGS ON DIAGNOSTIC IMAGING OF MUSCULOSKELETAL SYSTEM: Status: ACTIVE | Noted: 2020-04-14

## 2020-05-29 RX ORDER — AMLODIPINE BESYLATE 5 MG/1
5 TABLET ORAL DAILY
Qty: 30 TABLET | Refills: 5 | Status: SHIPPED | OUTPATIENT
Start: 2020-05-29 | End: 2020-12-10

## 2020-06-05 ENCOUNTER — HOSPITAL ENCOUNTER (OUTPATIENT)
Dept: INFUSION THERAPY | Age: 70
Discharge: HOME | End: 2020-06-05
Payer: MEDICARE

## 2020-06-05 PROCEDURE — 99211 OFF/OP EST MAY X REQ PHY/QHP: CPT

## 2020-07-15 ENCOUNTER — VIRTUAL VISIT (OUTPATIENT)
Dept: FAMILY MEDICINE CLINIC | Age: 70
End: 2020-07-15
Payer: MEDICARE

## 2020-07-15 VITALS — HEIGHT: 66 IN | WEIGHT: 190 LBS | BODY MASS INDEX: 30.53 KG/M2

## 2020-07-15 PROBLEM — G25.81 RESTLESS LEG SYNDROME: Status: ACTIVE | Noted: 2020-07-15

## 2020-07-15 PROCEDURE — 3017F COLORECTAL CA SCREEN DOC REV: CPT | Performed by: FAMILY MEDICINE

## 2020-07-15 PROCEDURE — 1123F ACP DISCUSS/DSCN MKR DOCD: CPT | Performed by: FAMILY MEDICINE

## 2020-07-15 PROCEDURE — 4040F PNEUMOC VAC/ADMIN/RCVD: CPT | Performed by: FAMILY MEDICINE

## 2020-07-15 PROCEDURE — 1036F TOBACCO NON-USER: CPT | Performed by: FAMILY MEDICINE

## 2020-07-15 PROCEDURE — G8427 DOCREV CUR MEDS BY ELIG CLIN: HCPCS | Performed by: FAMILY MEDICINE

## 2020-07-15 PROCEDURE — 99442 PR PHYS/QHP TELEPHONE EVALUATION 11-20 MIN: CPT | Performed by: FAMILY MEDICINE

## 2020-07-15 PROCEDURE — G0438 PPPS, INITIAL VISIT: HCPCS | Performed by: FAMILY MEDICINE

## 2020-07-15 PROCEDURE — G8417 CALC BMI ABV UP PARAM F/U: HCPCS | Performed by: FAMILY MEDICINE

## 2020-07-15 RX ORDER — ATORVASTATIN CALCIUM 40 MG/1
40 TABLET, FILM COATED ORAL DAILY
Qty: 30 TABLET | Refills: 5 | Status: SHIPPED | OUTPATIENT
Start: 2020-07-15 | End: 2021-01-15 | Stop reason: SDUPTHER

## 2020-07-15 RX ORDER — OMEPRAZOLE 40 MG/1
40 CAPSULE, DELAYED RELEASE ORAL DAILY
Qty: 30 CAPSULE | Refills: 5 | Status: SHIPPED | OUTPATIENT
Start: 2020-07-15 | End: 2021-05-27

## 2020-07-15 ASSESSMENT — LIFESTYLE VARIABLES
HOW MANY STANDARD DRINKS CONTAINING ALCOHOL DO YOU HAVE ON A TYPICAL DAY: 0
HOW OFTEN DURING THE LAST YEAR HAVE YOU BEEN UNABLE TO REMEMBER WHAT HAPPENED THE NIGHT BEFORE BECAUSE YOU HAD BEEN DRINKING: 0
HOW OFTEN DURING THE LAST YEAR HAVE YOU FOUND THAT YOU WERE NOT ABLE TO STOP DRINKING ONCE YOU HAD STARTED: 0
AUDIT-C TOTAL SCORE: 1
HOW OFTEN DO YOU HAVE A DRINK CONTAINING ALCOHOL: 1
HOW OFTEN DURING THE LAST YEAR HAVE YOU HAD A FEELING OF GUILT OR REMORSE AFTER DRINKING: 0
HAS A RELATIVE, FRIEND, DOCTOR, OR ANOTHER HEALTH PROFESSIONAL EXPRESSED CONCERN ABOUT YOUR DRINKING OR SUGGESTED YOU CUT DOWN: 0
AUDIT TOTAL SCORE: 1
HAVE YOU OR SOMEONE ELSE BEEN INJURED AS A RESULT OF YOUR DRINKING: 0
HOW OFTEN DURING THE LAST YEAR HAVE YOU FAILED TO DO WHAT WAS NORMALLY EXPECTED FROM YOU BECAUSE OF DRINKING: 0
HOW OFTEN DO YOU HAVE SIX OR MORE DRINKS ON ONE OCCASION: 0
HOW OFTEN DURING THE LAST YEAR HAVE YOU NEEDED AN ALCOHOLIC DRINK FIRST THING IN THE MORNING TO GET YOURSELF GOING AFTER A NIGHT OF HEAVY DRINKING: 0

## 2020-07-15 ASSESSMENT — PATIENT HEALTH QUESTIONNAIRE - PHQ9
SUM OF ALL RESPONSES TO PHQ QUESTIONS 1-9: 0
SUM OF ALL RESPONSES TO PHQ QUESTIONS 1-9: 0

## 2020-07-15 NOTE — PROGRESS NOTES
Medicare Annual Wellness Visit  Name: Ari Oropeza Date: 7/15/2020   MRN: [id number] Sex: Male   Age: 78 y.o. Ethnicity: Non-/Non    : 1950 Race: Tony Corea is here for Medicare AWV    Screenings for behavioral, psychosocial and functional/safety risks, and cognitive dysfunction are all negative except as indicated below. These results, as well as other patient data from the [address] form, are documented in Flowsheets linked to this Encounter. No Known Allergies    Prior to Visit Medications    Medication Sig Taking? Authorizing Provider   atorvastatin (LIPITOR) 40 MG tablet Take 1 tablet by mouth daily Yes Brad Wong MD   omeprazole (PRILOSEC) 40 MG delayed release capsule Take 1 capsule by mouth daily Yes Brad Wong MD   amLODIPine (NORVASC) 5 MG tablet Take 1 tablet by mouth daily Yes Elias Rivera MD   pramipexole (MIRAPEX) 0.25 MG tablet Take 1 tablet by mouth nightly Yes Jass Ross PA-C   ezetimibe (ZETIA) 10 MG tablet Take 1 tablet by mouth daily Yes Elias Rivera MD   sertraline (ZOLOFT) 50 MG tablet Take 1 tablet by mouth daily Yes Brad Wong MD   aspirin 81 MG EC tablet Take 81 mg by mouth daily. Yes Historical Provider, MD       Past Medical History:   Diagnosis Date    Abnormal CT scan     per pt \"did CT scan and found spot on spine( T 8 lesion) back in March( ) did bx but did not show anything and now ( 2019) going to try do another bx\"    BPH (benign prostatic hyperplasia)     CAD (coronary artery disease)     follows with Dr Roxanne Munoz    Chronic cough 2020    COPD (chronic obstructive pulmonary disease) ([facility] [address] 2019    follow with Dr Mary Álvarez Coronary arteriosclerosis     Excessive daytime sleepiness 2019    Family history of coronary artery disease     GERD (gastroesophageal reflux disease)     H/O cardiac catheterization 2002    ([facility]) Total occ.  LAD w/mild disease of LM and RCA.     H/O cardiovascular stress test 02-    (Exercise) EF 58%. Normal. Exercise cap. 11.0 METS.    H/O cardiovascular stress test 05-    (Cardiolite) EF 63%. Good exercise capacity. Hypertensive blood pressure response tp exercise. ETT neg for ischemia or arrhythmia. Cardiolite perfusion imaging reveals Ant. wall soft tissue attenuation artifact and mild ischemia of Inf. wall.  H/O colonoscopy with polypectomy 02-    Polyp in Rectum    H/O Doppler ultrasound 03-    (Carotid) No anatomical abnormalities. Normal    H/O echocardiogram 02- 6/14 EF 50-55%  abn LV diast stage 1 2/10EF 50-55%. Abn. LV Diastolic function Stage 1. Left Atrium borderline Dilated.  H/O gastritis     Years Ago    History of cardiac monitoring 11/18/2017    30 day monitoring: normal, Sinus rhythm noted. no symptoms and no a fib or arrhythmia noted    History of complete ECG 04-    [place] (hard of hearing)     noé hearing aides    HTN (hypertension)     Hx of cardiovascular stress test 10/28/2015    cardiolite-mild ischemia apical,EF64%    Hx of echocardiogram 6/9/2016    EF 55-60%. LA is mildly dilated. RV is mildly dilated. Mild MR. Mild tricuspid insufficiency. Significant VHD is absent.      Hyperlipidemia     Narcolepsy     Nocturnal hypoxemia 4/5/2019    Obstructive sleep apnea 04/05/2019    \"had sleep study done and they said I do not have sleep apnea, put me on oxygen at night \"    On home oxygen therapy     2l/nc at night only    Periodic limb movements of sleep 5/30/2019    Polyp of colon     Reflux     RLS (restless legs syndrome)     S/P CABG x 1 03-    Stricture of esophagus     S/P Dilation (Dr. Radha Cook)    UTI (urinary tract infection) 04/2019    Wears glasses        Past Surgical History:   Procedure Laterality Date    BONE BIOPSY      per old chart bx T 8 3/2019    BONE BIOPSY N/A 07/05/2019    IR T-8    COLONOSCOPY  last one 2016 2008    CORONARY ARTERY BYPASS GRAFT  03-    x 1 - [place] to LAD - Dr. Alexander Walker Right 2019   Ligia Miguel  as a kid   [facility] VASECTOMY  1976       Family History   Problem Relation Age of Onset    Heart Disease Mother         Pacemaker, MVR, CABG    Stroke Mother     Cancer Father         lung ca- smoker       CareTeam (Including outside providers/suppliers regularly involved in providing care):   Patient Care Team:  Eleanor Butt MD as PCP - General  Eleanor Butt MD as PCP - [facility] Empaneled Provider  Rufino Sommers MD as Consulting Physician (Cardiology)  Kimani Sawyer MD as Consulting Physician (Electrophysiology)    Wt Readings from Last 3 Encounters:   07/15/20 190 lb (86.2 kg)   02/18/20 190 lb 6.4 oz (86.4 kg)   02/05/20 195 lb (88.5 kg)     Vitals:    07/15/20 1609   Weight: 190 lb (86.2 kg)   Height: 5' 6\" (1.676 m)     Body mass index is 30.67 kg/m². Based upon direct observation of the patient, evaluation of cognition reveals recent and remote memory intact. Patient's complete Health Risk Assessment and screening values have been reviewed and are found in Flowsheets. The following problems were reviewed today and where indicated follow up appointments were made and/or referrals ordered. Positive Risk Factor Screenings with Interventions:     General Health:  General  In general, how would you say your health is?: Very Good  In the past 7 days, have you experienced any of the following? New or Increased Pain, New or Increased Fatigue, Loneliness, Social Isolation, Stress or Anger?: None of These  Do you get the social and emotional support that you need?: Yes  Do you have a Living Will?: (!) No  General Health Risk Interventions:  · No Living Will: additional information provided verbally due to being virtual visit.      Health Habits/Nutrition:  Health Habits/Nutrition  Do you exercise for at least 20 minutes 2-3 times per week?: Yes  Have you lost any weight without trying in the past 3 months?: No  Do you eat fewer than 2 meals per day?: No  Have you seen a dentist within the past year?: Yes  Body mass index is 30.67 kg/m². Health Habits/Nutrition Interventions:  · Nutritional issues:  patient is not ready to address his/her nutritional/weight issues at this time    Hearing/Vision:  No exam data present  Hearing/Vision  Do you or your family notice any trouble with your hearing?: (!) Yes  Do you have difficulty driving, watching TV, or doing any of your daily activities because of your eyesight?: (!) Yes  Have you had an eye exam within the past year?: Yes  Hearing/Vision Interventions:  · Hearing concerns:  patient declines any further evaluation/treatment for hearing issues  · Vision concerns:  patient encouraged to make appointment with his/her eye specialist   · Patient states that he wears hearing aides. Safety:  Safety  Do you have working smoke detectors?: (!) No  Have all throw rugs been removed or fastened?: Yes  Do you have non-slip mats or surfaces in all bathtubs/showers?: Yes  Do all of your stairways have a railing or banister?: Yes  Are your doorways, halls and stairs free of clutter?: Yes  Do you always fasten your seatbelt when you are in a car?: Yes  Safety Interventions:  · Home safety tips provided verbally due to being virtual visit.      Personalized Preventive Plan   Current Health Maintenance Status  Immunization History   Administered Date(s) Administered    Influenza Virus Vaccine 11/20/2018    Influenza Whole 10/31/2011    Influenza, High Dose (Fluzone 65 yrs and older) 11/20/2018    Pneumococcal Conjugate 13-valent ([id number]) 01/01/2019, 03/17/2019    Pneumococcal Polysaccharide ([id number]) 08/21/2019    Tdap (Boostrix, Adacel) 02/28/2014    Zoster Vaccine 09/27/2019, 12/06/2019        Health Maintenance   Topic Date Due    AAA screen  1950    Shingles Vaccine (1 of 2) 11/21/1999    Annual Wellness Visit (AWV)  06/24/2019    A1C test (Diabetic or Prediabetic)  03/16/2020    PSA counseling  04/01/2020    Lipid screen  08/21/2020    Flu vaccine (1) 09/01/2020    DTaP/Tdap/Td vaccine (2 - Td) 02/28/2024    Colon cancer screen colonoscopy  03/23/2026    Pneumococcal 65+ years Vaccine  Completed    Hepatitis C screen  Completed    Hepatitis A vaccine  Aged Out    Hepatitis B vaccine  Aged Out    Hib vaccine  Aged Out    Meningococcal (ACWY) vaccine  Aged Out     Recommendations for [employer] Due: see orders and patient instructions/AVS.    Unable to obtain three vital signs due to patient not having equipment to take temperature or BP. Recommended screening schedule for the next 5-10 years is provided to the patient in written form: see Patient Instructions/AVS.    Citlali Moeller LPN, 5/47/3819, performed the documented evaluation under the direct supervision of the attending physician. Yoselin Acosta is a 78 y.o. male being evaluated by a Virtual Visit (audio visit) encounter to address concerns as mentioned above. A caregiver was present when appropriate. Due to this being a TeleHealth encounter (During NQA-20 public health emergency), evaluation of the following organ systems was limited: Vitals/Constitutional/EENT/Resp/CV/GI//MS/Neuro/Skin/Heme-Lymph-Imm. Pursuant to the emergency declaration under the [address] [place] [address], [address] [place] [address] authority and the [employer] Act, this Virtual Visit was conducted with patient's (and/or legal guardian's) consent, to reduce the patient's risk of exposure to COVID-19 and provide necessary medical care. The patient (and/or legal guardian) has also been advised to contact this office for worsening conditions or problems, and seek emergency medical treatment and/or call 911 if deemed necessary.      Patient identification was verified at the start of the visit: Yes    Total time spent for this encounter: 15 minutes    Services were provided through audio synchronous discussion virtually to substitute for in-person clinic visit. Patient and provider were located at their individual homes. --Valarie Donato LPN on 9/01/9051 at 9:87 PM    An electronic signature was used to authenticate this note. This encounter was performed under myValerie MDs, direct supervision, 7/15/2020.

## 2020-07-15 NOTE — PATIENT INSTRUCTIONS
Personalized Preventive Plan for Alex Patel - 7/15/2020  Medicare offers a range of preventive health benefits. Some of the tests and screenings are paid in full while other may be subject to a deductible, co-insurance, and/or copay. Some of these benefits include a comprehensive review of your medical history including lifestyle, illnesses that may run in your family, and various assessments and screenings as appropriate. After reviewing your medical record and screening and assessments performed today your provider may have ordered immunizations, labs, imaging, and/or referrals for you. A list of these orders (if applicable) as well as your Preventive Care list are included within your After Visit Summary for your review. Other Preventive Recommendations:    · A preventive eye exam performed by an eye specialist is recommended every 1-2 years to screen for glaucoma; cataracts, macular degeneration, and other eye disorders. · A preventive dental visit is recommended every 6 months. · Try to get at least 150 minutes of exercise per week or 10,000 steps per day on a pedometer . · Order or download the FREE \"Exercise & Physical Activity: Your Everyday Guide\" from [employer]. Call 1-860.323.7976 or search [employer] online. · You need 6461-9051 mg of calcium and 1805-0464 IU of vitamin D per day. It is possible to meet your calcium requirement with diet alone, but a vitamin D supplement is usually necessary to meet this goal.  · When exposed to the sun, use a sunscreen that protects against both UVA and UVB radiation with an SPF of 30 or greater. Reapply every 2 to 3 hours or after sweating, drying off with a towel, or swimming. · Always wear a seat belt when traveling in a car. Always wear a helmet when riding a bicycle or motorcycle.

## 2020-07-15 NOTE — PROGRESS NOTES
Serena Miller is a 78 y.o. male evaluated via telephone on 7/15/2020. Consent:  He and/or health care decision maker is aware that that he may receive a bill for this telephone service, depending on his insurance coverage, and has provided verbal consent to proceed: Yes      Documentation:  I communicated with the patient and/or health care decision maker about hypertension, GERD, restless leg symptoms, anxiety, history of vertebral thoracic vertebral tumor and known coronary disease. On review his lab work is due. He continues to work part-time and is having no problem with that- in fact he is really enjoying it. He had a right rotator cuff surgery late in 2019 and is back to about baseline and out of pain. Mood remains good and meds are well-tolerated. He does not have any chest pain or shortness of breath- he follows closely with cardiology. GERD symptoms are under control and restless leg symptoms for the most part are good- he only uses his Mirapex as needed. Anxiety is fine with sertraline and he does see oncology for the thoracic vertebral tumor. Immunizations are basically up-to-date. We spent some time answering questions and discussing the situation- he would benefit from continued healthy lifestyle and social distancing. He needs refills which will be provided today, and we will check a CBC, CMP,and  lipid panel. He is to follow-up for these results. I would like to see him back in 6 months or sooner as needed. Details of this discussion including any medical advice provided as above. I affirm this is a Patient Initiated Episode with a Patient who has not had a related appointment within my department in the past 7 days or scheduled within the next 24 hours.     Patient identification was verified at the start of the visit: Yes    Total Time: minutes: 11-20 minutes    Note: not billable if this call serves to triage the patient into an appointment for the relevant concern      AJITH Padmini Portillo

## 2020-07-16 DIAGNOSIS — I10 ESSENTIAL HYPERTENSION: ICD-10-CM

## 2020-07-16 DIAGNOSIS — I25.10 CORONARY ARTERY DISEASE INVOLVING NATIVE CORONARY ARTERY OF NATIVE HEART WITHOUT ANGINA PECTORIS: ICD-10-CM

## 2020-07-16 LAB
A/G RATIO: 1.5 (ref 1.1–2.2)
ALBUMIN SERPL-MCNC: 4.4 G/DL (ref 3.4–5)
ALP BLD-CCNC: 75 U/L (ref 40–129)
ALT SERPL-CCNC: 34 U/L (ref 10–40)
ANION GAP SERPL CALCULATED.3IONS-SCNC: 13 MMOL/L (ref 3–16)
AST SERPL-CCNC: 27 U/L (ref 15–37)
BILIRUB SERPL-MCNC: 1.2 MG/DL (ref 0–1)
BUN BLDV-MCNC: 16 MG/DL (ref 7–20)
CALCIUM SERPL-MCNC: 9.3 MG/DL (ref 8.3–10.6)
CHLORIDE BLD-SCNC: 104 MMOL/L (ref 99–110)
CHOLESTEROL, TOTAL: 122 MG/DL (ref 0–199)
CO2: 23 MMOL/L (ref 21–32)
CREAT SERPL-MCNC: 0.8 MG/DL (ref 0.8–1.3)
GFR AFRICAN AMERICAN: >60
GFR NON-AFRICAN AMERICAN: >60
GLOBULIN: 2.9 G/DL
GLUCOSE BLD-MCNC: 104 MG/DL (ref 70–99)
HCT VFR BLD CALC: 52.1 % (ref 40.5–52.5)
HDLC SERPL-MCNC: 40 MG/DL (ref 40–60)
HEMOGLOBIN: 17 G/DL (ref 13.5–17.5)
LDL CHOLESTEROL CALCULATED: 56 MG/DL
MCH RBC QN AUTO: 28.5 PG (ref 26–34)
MCHC RBC AUTO-ENTMCNC: 32.6 G/DL (ref 31–36)
MCV RBC AUTO: 87.4 FL (ref 80–100)
PDW BLD-RTO: 13.9 % (ref 12.4–15.4)
PLATELET # BLD: 179 K/UL (ref 135–450)
PMV BLD AUTO: 10.1 FL (ref 5–10.5)
POTASSIUM SERPL-SCNC: 4.7 MMOL/L (ref 3.5–5.1)
RBC # BLD: 5.96 M/UL (ref 4.2–5.9)
SODIUM BLD-SCNC: 140 MMOL/L (ref 136–145)
TOTAL PROTEIN: 7.3 G/DL (ref 6.4–8.2)
TRIGL SERPL-MCNC: 129 MG/DL (ref 0–150)
VLDLC SERPL CALC-MCNC: 26 MG/DL
WBC # BLD: 5.4 K/UL (ref 4–11)

## 2020-08-13 ENCOUNTER — OFFICE VISIT (OUTPATIENT)
Dept: CARDIOLOGY CLINIC | Age: 70
End: 2020-08-13
Payer: MEDICARE

## 2020-08-13 VITALS
HEIGHT: 66 IN | DIASTOLIC BLOOD PRESSURE: 80 MMHG | BODY MASS INDEX: 32.33 KG/M2 | HEART RATE: 68 BPM | SYSTOLIC BLOOD PRESSURE: 120 MMHG | WEIGHT: 201.2 LBS

## 2020-08-13 PROCEDURE — 99214 OFFICE O/P EST MOD 30 MIN: CPT | Performed by: INTERNAL MEDICINE

## 2020-08-13 PROCEDURE — 3017F COLORECTAL CA SCREEN DOC REV: CPT | Performed by: INTERNAL MEDICINE

## 2020-08-13 PROCEDURE — 1036F TOBACCO NON-USER: CPT | Performed by: INTERNAL MEDICINE

## 2020-08-13 PROCEDURE — 4040F PNEUMOC VAC/ADMIN/RCVD: CPT | Performed by: INTERNAL MEDICINE

## 2020-08-13 PROCEDURE — G8427 DOCREV CUR MEDS BY ELIG CLIN: HCPCS | Performed by: INTERNAL MEDICINE

## 2020-08-13 PROCEDURE — 1123F ACP DISCUSS/DSCN MKR DOCD: CPT | Performed by: INTERNAL MEDICINE

## 2020-08-13 PROCEDURE — G8417 CALC BMI ABV UP PARAM F/U: HCPCS | Performed by: INTERNAL MEDICINE

## 2020-08-13 RX ORDER — EZETIMIBE 10 MG/1
10 TABLET ORAL DAILY
Qty: 30 TABLET | Refills: 6 | Status: SHIPPED | OUTPATIENT
Start: 2020-08-13 | End: 2021-02-17 | Stop reason: SDUPTHER

## 2020-08-13 NOTE — LETTER
[facility]  [address] [address]  Phone: [phone]  Fax: [phone]    Sanchez Baltazar MD        August 13, 2020     Shelley Burch, [address] [place] [address] [address]    Patient: Buster Gage  MR Number: [id number]  YOB: 1950  Date of Visit: 8/13/2020    Dear Dr. Shelley Burch:    Thank you for the request for consultation for Emiliano Hardy to me for the evaluation of CAD. Below are the relevant portions of my assessment and plan of care. If you have questions, please do not hesitate to call me. I look forward to following Jessica Orozco along with you.     Sincerely,        Sanchez Baltazar MD

## 2020-08-13 NOTE — PROGRESS NOTES
Melanie Valdovinos is a 78 y.o. male who has    CHIEF COMPLAINT AS FOLLOWS:   CHEST PAIN: Patient denies any C/O chest pains at this time. SOB: Has SOB with exertion but no change over previous noted. LEG EDEMA: No leg edema   PALPITATIONS: Denies any C/O Palpitations                                   DIZZINESS: No C/O Dizziness                           SYNCOPE: None   OTHER:                                     HPI: Patient is here for F/U on his CAD, HTN & Dyslipidemia problems. He does not have any complaints at this time.     Kiesha Mnceal has the following history recorded in care path:  Patient Active Problem List    Diagnosis Date Noted    Iron deficiency anemia due to chronic blood loss 03/23/2016     Priority: Low    Family history of coronary artery disease      Priority: Low    HTN (hypertension)      Priority: Low    CAD (coronary artery disease)      Priority: Low    Hyperlipidemia      Priority: Low    GERD (gastroesophageal reflux disease)      Priority: Low    S/P CABG x 1      Priority: Low    Restless leg syndrome 07/15/2020    Abnormal findings on diagnostic imaging of musculoskeletal system 04/14/2020    Chronic cough 02/18/2020    Narcolepsy     Coronary arteriosclerosis     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    Anxiety 08/21/2019    Periodic limb movements of sleep 05/30/2019    Nocturnal hypoxemia 04/05/2019    Excessive daytime sleepiness 04/05/2019    COPD (chronic obstructive pulmonary disease) ([facility] 04/05/2019    Dyspnea     Acute respiratory failure with hypoxia ([facility] 03/14/2019    Community acquired pneumonia 03/14/2019     Current Outpatient Medications   Medication Sig Dispense Refill    atorvastatin (LIPITOR) 40 MG tablet Take 1 tablet by mouth daily 30 tablet 5    omeprazole (PRILOSEC) 40 MG delayed release capsule Take 1 capsule by mouth daily 30 capsule 5    amLODIPine (NORVASC) 5 MG tablet Take 1 tablet by mouth daily 30 tablet 5    pramipexole (MIRAPEX) 0.25 MG tablet Take 1 tablet by mouth nightly 30 tablet 5    ezetimibe (ZETIA) 10 MG tablet Take 1 tablet by mouth daily 30 tablet 6    sertraline (ZOLOFT) 50 MG tablet Take 1 tablet by mouth daily 30 tablet 5    aspirin 81 MG EC tablet Take 81 mg by mouth daily. No current facility-administered medications for this visit. Allergies: Patient has no known allergies. Past Medical History:   Diagnosis Date    Abnormal CT scan     per pt \"did CT scan and found spot on spine( T 8 lesion) back in March( 2019) did bx but did not show anything and now ( 7/5/2019) going to try do another bx\"    BPH (benign prostatic hyperplasia)     CAD (coronary artery disease)     follows with Dr Muriel Mcdonald    Chronic cough 2/18/2020    COPD (chronic obstructive pulmonary disease) ([address] 4/5/2019    follow with Dr Chitra Zhang Coronary arteriosclerosis     Excessive daytime sleepiness 4/5/2019    Family history of coronary artery disease     GERD (gastroesophageal reflux disease)     H/O cardiac catheterization 03-    ([facility]) Total occ. LAD w/mild disease of LM and RCA.     H/O cardiovascular stress test 02-    (Exercise) EF 58%. Normal. Exercise cap. 11.0 METS.    H/O cardiovascular stress test 05-    (Cardiolite) EF 63%. Good exercise capacity. Hypertensive blood pressure response tp exercise. ETT neg for ischemia or arrhythmia. Cardiolite perfusion imaging reveals Ant. wall soft tissue attenuation artifact and mild ischemia of Inf. wall.  H/O colonoscopy with polypectomy 02-    Polyp in Rectum    H/O Doppler ultrasound 03-    (Carotid) No anatomical abnormalities. Normal    H/O echocardiogram 02- 6/14 EF 50-55%  abn LV diast stage 1 2/10EF 50-55%. Abn. LV Diastolic function Stage 1. Left Atrium borderline Dilated.      H/O gastritis     Years Ago    History of cardiac monitoring 2017    30 day monitoring: normal, Sinus rhythm noted. no symptoms and no a fib or arrhythmia noted    History of complete ECG 2002    [place] (hard of hearing)     noé hearing aides    HTN (hypertension)     Hx of cardiovascular stress test 10/28/2015    cardiolite-mild ischemia apical,EF64%    Hx of echocardiogram 2016    EF 55-60%. LA is mildly dilated. RV is mildly dilated. Mild MR. Mild tricuspid insufficiency. Significant VHD is absent.  Hyperlipidemia     Narcolepsy     Nocturnal hypoxemia 2019    Obstructive sleep apnea 2019    \"had sleep study done and they said I do not have sleep apnea, put me on oxygen at night \"    On home oxygen therapy     2l/nc at night only    Periodic limb movements of sleep 2019    Polyp of colon     Reflux     RLS (restless legs syndrome)     S/P CABG x 1 2002    Stricture of esophagus     S/P Dilation (Dr. Ariadne Connolly)    UTI (urinary tract infection) 2019    Wears glasses      Past Surgical History:   Procedure Laterality Date    BONE BIOPSY      per old chart bx T 8 3/2019    BONE BIOPSY N/A 2019    IR T-8    COLONOSCOPY  last one 2016    CORONARY ARTERY BYPASS GRAFT  03-    x 1 - [place] to LAD - Dr. Matty Tee Right 2019   [address]  as a kid   Lizzeth Salas [address]      As reviewed   Family History   Problem Relation Age of Onset    Heart Disease Mother         Pacemaker, MVR, CABG    Stroke Mother     Cancer Father         lung ca- smoker     Social History     Tobacco Use    Smoking status: Former Smoker     Packs/day: 0.00     Years: 10.00     Pack years: 0.00     Types: Pipe     Last attempt to quit: 1985     Years since quittin.6    Smokeless tobacco: Never Used   Substance Use Topics    Alcohol use:  Yes     Alcohol/week: 0.0 standard drinks     Comment: occ alcohol/ caffeine 2 cups of coffee a day      Review of Systems:    Constitutional: Negative for diaphoresis and fatigue  Psychological:Negative for anxiety or depression  HENT: Negative for headaches, nasal congestion, sinus pain or vertigo  Eyes: Negative for visual disturbance. Endocrine: Negative for polydipsia/polyuria  Respiratory: Negative for shortness of breath  Cardiovascular: Negative for chest pain, dyspnea on exertion, claudication, edema, irregular heartbeat, murmur, palpitations or shortness of breath  Gastrointestinal: Negative for abdominal pain or heartburn  Genito-Urinary: Negative for urinary frequency/urgency  Musculoskeletal: Negative for muscle pain, muscular weakness, negative for pain in arm and leg or swelling in foot and leg  Neurological: Negative for dizziness, headaches, memory loss, numbness/tingling, visual changes, syncope  Dermatological: Negative for rash    Objective: There were no vitals taken for this visit. Wt Readings from Last 3 Encounters:   07/15/20 190 lb (86.2 kg)   02/18/20 190 lb 6.4 oz (86.4 kg)   02/05/20 195 lb (88.5 kg)     There is no height or weight on file to calculate BMI. No flowsheet data found. There were no vitals filed for this visit. GENERAL - Alert, oriented, pleasant, in no apparent distress. EYES: No jaundice, no conjunctival pallor. SKIN: It is warm & dry. No rashes. No Echhymosis    HEENT - No clinically significant abnormalities seen. Neck - Supple. No jugular venous distention noted. No carotid bruits. Cardiovascular - Normal S1 and S2 without obvious murmur or gallop. Extremities - No cyanosis, clubbing, or significant edema. Pulmonary - No respiratory distress. No wheezes or rales. Abdomen - No masses, tenderness, or organomegaly. Musculoskeletal - No significant edema. No joint deformities. No muscle wasting. Neurologic - Cranial nerves II through XII are grossly intact. There were no gross focal neurologic abnormalities.     Lab Review   Lab Results   Component Value Date    CKTOTAL 310 03/14/2019 TROPONINT <0.010 03/14/2019     BNP:  No results found for: BNP  PT/INR:    Lab Results   Component Value Date    INR 1.00 07/05/2019    INR 1.07 03/19/2019     Lab Results   Component Value Date    LABA1C 6.3 03/16/2019     Lab Results   Component Value Date    WBC 5.4 07/16/2020    WBC 4.9 07/05/2019    HCT 52.1 07/16/2020    HCT 48.0 07/05/2019    MCV 87.4 07/16/2020    MCV 86.2 07/05/2019     07/16/2020     07/05/2019     Lab Results   Component Value Date    CHOL 122 07/16/2020    CHOL 149 08/21/2019    TRIG 129 07/16/2020    TRIG 145 08/21/2019    HDL 40 07/16/2020    HDL 49 08/21/2019    LDLCALC 56 07/16/2020    LDLCALC 71 08/21/2019    LDLDIRECT 67 02/08/2012    LDLDIRECT 66 02/16/2011     Lab Results   Component Value Date    ALT 34 07/16/2020    ALT 41 (H) 08/21/2019    AST 27 07/16/2020    AST 26 08/21/2019     BMP:    Lab Results   Component Value Date     07/16/2020     08/21/2019    K 4.7 07/16/2020    K 4.2 08/21/2019     07/16/2020     08/21/2019    CO2 23 07/16/2020    CO2 25 08/21/2019    BUN 16 07/16/2020    BUN 18 08/21/2019    CREATININE 0.8 07/16/2020    CREATININE 0.8 08/21/2019     CMP:   Lab Results   Component Value Date     07/16/2020     08/21/2019    K 4.7 07/16/2020    K 4.2 08/21/2019     07/16/2020     08/21/2019    CO2 23 07/16/2020    CO2 25 08/21/2019    BUN 16 07/16/2020    BUN 18 08/21/2019    PROT 7.3 07/16/2020    PROT 6.7 08/21/2019     TSH:    Lab Results   Component Value Date    TSH 2.3 10/14/2016    TSH 2.4 08/16/2013    TSHHS 3.950 03/14/2019       QUALITY MEASURES REVIEWED:  1.CAD:Patient is taking anti platelet agent:Yes  2. DYSLIPIDEMIA: Patient is on cholesterol lowering medication:Yes  3. Beta-Blocker therapy for CAD, if prior Myocardial Infarction:No  4. Atrial fibrillation & anticoagulation therapy No  5. Discussed weight management strategies. Impression:    No diagnosis found.    Patient Active healthy and therapeutic life style changes for cardiovascular risk reduction.  Various goals are discussed and multiple questions answered

## 2020-08-13 NOTE — PATIENT INSTRUCTIONS
CAD:Yes   clinically stable. Patient is on optimal medical regimen ( see medication list above )  -     CORONARY ARTERY DISEASE: Patient is currently  asymptomatic from CAD. - changes in  treatment:   no           - Testing ordered:  no  [facility] classification: 1  [place] RISK SCORE:  [place] RISK SCORE:  HTN:well controlled on current medical regimen, see list above. - changes in  treatment:   no   CARDIOMYOPATHY: None known   CONGESTIVE HEART FAILURE: NO KNOWN HISTORY.      VHD: No significant VHD noted  DYSLIPIDEMIA: Patient's profile is at / near [employer],                                                               Tolerating current medical regimen wellyes,                                                            See most recent Lab values in Labs section above. OTHER RELEVANT DIAGNOSIS:as noted in patient's active problem list:  TESTS ORDERED: none this visit. All previously ordered tests reviewed. MEDICATIONS: CPM   Office f/u in six months. Primary/secondary prevention is the goal by aggressive risk modification, healthy and therapeutic life style changes for cardiovascular risk reduction.  Various goals are discussed and multiple questions answered

## 2020-08-17 ENCOUNTER — OFFICE VISIT (OUTPATIENT)
Dept: PULMONOLOGY | Age: 70
End: 2020-08-17
Payer: MEDICARE

## 2020-08-17 VITALS
WEIGHT: 195 LBS | DIASTOLIC BLOOD PRESSURE: 60 MMHG | OXYGEN SATURATION: 96 % | BODY MASS INDEX: 31.34 KG/M2 | HEART RATE: 70 BPM | HEIGHT: 66 IN | SYSTOLIC BLOOD PRESSURE: 120 MMHG

## 2020-08-17 PROBLEM — G47.34 NOCTURNAL HYPOXEMIA: Status: RESOLVED | Noted: 2019-04-05 | Resolved: 2020-08-17

## 2020-08-17 PROBLEM — Z87.891 FORMER SMOKER: Status: ACTIVE | Noted: 2020-08-17

## 2020-08-17 PROCEDURE — 1036F TOBACCO NON-USER: CPT | Performed by: INTERNAL MEDICINE

## 2020-08-17 PROCEDURE — G8926 SPIRO NO PERF OR DOC: HCPCS | Performed by: INTERNAL MEDICINE

## 2020-08-17 PROCEDURE — G8427 DOCREV CUR MEDS BY ELIG CLIN: HCPCS | Performed by: INTERNAL MEDICINE

## 2020-08-17 PROCEDURE — G8417 CALC BMI ABV UP PARAM F/U: HCPCS | Performed by: INTERNAL MEDICINE

## 2020-08-17 PROCEDURE — 99213 OFFICE O/P EST LOW 20 MIN: CPT | Performed by: INTERNAL MEDICINE

## 2020-08-17 PROCEDURE — 1123F ACP DISCUSS/DSCN MKR DOCD: CPT | Performed by: INTERNAL MEDICINE

## 2020-08-17 PROCEDURE — 3017F COLORECTAL CA SCREEN DOC REV: CPT | Performed by: INTERNAL MEDICINE

## 2020-08-17 PROCEDURE — 4040F PNEUMOC VAC/ADMIN/RCVD: CPT | Performed by: INTERNAL MEDICINE

## 2020-08-17 PROCEDURE — 3023F SPIROM DOC REV: CPT | Performed by: INTERNAL MEDICINE

## 2020-08-17 NOTE — PROGRESS NOTES
SUBJECTIVE:  Chief Complaint: Simple chronic bronchitis, chronic cough, former smoker  Anusha Mendez has done very well over the past 6 months. He denies any recent bronchitic infections. He has had no COVID-19 exposure and denies any fever associated with his cough. He continues to cough on and off throughout the day and is always nonproductive but is not bothering him. He is not on bronchodilator therapy    ROS:  Constitution:  HEENT: Negative for ear, throat pain  Cardiovascular: Negative for chest pain, syncope, edema  Pulmonary: See HPI  Musculoskeletal: Negative for DVT, myalgias, arthralgias    OBJECTIVE:  /60   Pulse 70   Ht 5' 6\" (1.676 m)   Wt 195 lb (88.5 kg)   SpO2 96%   BMI 31.47 kg/m²      Physical Exam:  Constitutional:  He appears well developed and well-nourished. Neck:  Supple, No palpable lymphadenopathy, No JVD  Cardiovascular:  S1, S2 Normal, Regular rhythm, no murmurs or gallops, No pericardial  rubs. Pulmonary: Breath sounds are clear throughout all areas without wheezing or rhonchi  Abdomen: Not examined  Extremities: no edema, No DVT  Neurologic: Oriented x3, No focal deficits    Radiology: Chest x-ray at [facility] on 12/26/2019 showed a stable chest with no acute cardiopulmonary disease  PFT: Spirometry done at [facility] on 6/11/2019 demonstrated no significant airways obstruction and no significant response to bronchodilators          ASSESSMENT:    1. Chronic bronchitis, unspecified chronic bronchitis type ([address]    2. Chronic cough    3. Former smoker          PLAN:   He is doing very well and is not on bronchodilator therapy. His cough is mildly chronic but does not seem to be debilitating. I will plan on seeing him back in 1 year and repeating his pulmonary function test.  In the interim if he develops any bronchitis I will see him back sooner. I will continue to follow him    We have discussed the need to maintain yearly flu immunization, pneumococcal vaccination.  We have discussed Coronavirus precaution including handwashing practice, wiping items touched in public such as gas pumps, door handles, shopping carts, etc. Self monitoring for infection - fever, chills, cough, SOB. Should they develop symptoms they should call office for further instructions. Return in about 1 year (around 8/17/2021) for Recheck for COPD, Recheck for Cough. This dictation was performed with a verbal recognition program and it was checked for errors. It is possible that there are still dictated errors within this office note. Any errors should be brought immediately to my attention for correction. All efforts were made to ensure that this office note is accurate.

## 2020-12-03 NOTE — PROGRESS NOTES
Patient Name: Cheikh Fernandes  Patient : 1950  Patient MRN: [id number]     Primary Oncologist: Jorge Alberto Judge MD  Referring Provider: Dionicio Pérez MD     Date of Service: 2020      Chief Complaint:   Chief Complaint   Patient presents with    Follow-up     He came in for follow-up visit. Patient Active Problem List:     CAD (coronary artery disease)     Hyperlipidemia     GERD (gastroesophageal reflux disease)     S/P CABG x 1     HTN (hypertension)     Family history of coronary artery disease     Iron deficiency anemia due to chronic blood loss     Acute respiratory failure with hypoxia ([address]     Community acquired pneumonia     Dyspnea     Excessive daytime sleepiness     COPD (chronic obstructive pulmonary disease) (HCC)     Periodic limb movements of sleep     Tumor T8 (hyaline cartilage)     Anxiety     Narcolepsy     Coronary arteriosclerosis     Chronic cough     Abnormal findings on diagnostic imaging of musculoskeletal system     Restless leg syndrome     Former smoker     HPI:   Ishmael Thomas is a pleasant 69 y.o.  male patient with significant past medical history of BPH, HTN who was hospitalized in 2019 due to fever. He was diagnosed with BPH/UTI. He was found to have elevated elevated PSA and has been followed by urologist.  CT chest:  1. No acute pulmonary artery embolism. 2. Bibasilar atelectasis. 3. Lytic destructive appearance of the T8 vertebral body. This is considered metastatic until proven otherwise. Recommend whole body bone scan to assess for metabolic activity and potential metachronous lesion. He has history of fall off roof more than 40 years ago. Bone scan : Amorphous radiotracer accumulation associated with the posterior elements of T8 corresponding to the expansile lytic destructive lesion seen on recent CT. No additional foci of abnormal radiotracer accumulation throughout the skeleton. He had bone biopsy on 2019.   Pathology report revealed:  Bone, T8 vertebra, lytic lesion; CT guided needle biopsy: - Small fragments of cartilage, bone, skeletal muscle and soft tissue showing no significant histopathologic change. Flow cytometry study was not significant. SPEP, serum light chain study were unremarkable. He will follow-up with urologist as an outpatient. I plan to have follow-up imaging study in 3 months. He is currently asymptomatic. MRI of T-spine in June 2019 : non specific abnormal marrow signal of T-8. After discussing with patient and radiologist, patient is agreeable to have repeat biopsy of T-8 from the pedicles or vertebral body. On July 17, 2019 he came in for follow up visit. He had colonoscopy in July 2019. Repeat in 2021. 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 Awaiting for official report from [place]. For the time being I refer to spine surgeon at [facility]. He will see dermatologist on July 23, 2019. On September 3, 2019 he came in for follow up visit. He was seen by Dr Yudy Lopez, sarcoma specialist at [facility] who recommended closed surveillance. 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 CT cervical, thoracic and lumbar spine in August 2019 : expansile, mixed lytic and sclerotic lesion of T8 involving the posterior element and R pedicle and R posterior and central aspect of vertebral body, consistent with chondroid type tumor. Reportedly PSA has improved and he is followed by Dr Natividad Pacheco. CT and MRI of thoracic spine in November 2019 were reviewed, grossly stable. MRI of thoracic spine in April 2020 showed stable expansile chondroid type lesion within the posterior element of T8. On December 8, 2020 he came in for follow-up visit. CT and MRI of the thoracic spine in November 2020 were reviewed and stable. He will follow-up with Titi Dalal in 6 months.   He will follow up with urologist tomorrow. He has PSA checked yesterday. He already had the flu shot. He is asymptomatic. He denied any nausea, vomiting or diarrhea. He denies any weight loss or night sweats. No fever or chills. He denies any depression. Past medical history:  Coronary artery disease, BPH, dyslipidemia, hypertension, acid reflux. Past surgical history:  Colonoscopy with removal of polyp from rectum in February 2008. CABG in March 2002, tonsillectomy, vasectomy in 1976. Biopsy of the T-8 in March 2019. Social history:  He quit smoking more than 34 years ago. He denies any alcohol or illicit drug use. He has 2 biological children and one stepdaughter. Family history:  Father had lung cancer. Review of Systems: \"Per interval history; otherwise 10 point ROS is negative. \"     Vital Signs:  BP (!) 140/84 (Site: Right Upper Arm, Position: Sitting, Cuff Size: Medium Adult)   Pulse 63   Temp 96.6 °F (35.9 °C) (Infrared)   Ht 5' 6\" (1.676 m)   Wt 205 lb 3.2 oz (93.1 kg)   SpO2 91%   BMI 33.12 kg/m²     Physical Exam:  CONSTITUTIONAL: awake, alert, cooperative, no apparent distress   EYES: pupils equal, round and reactive to light, sclera clear and conjunctiva normal  ENT: Normocephalic, without obvious abnormality, atraumatic  NECK: supple, symmetrical, no jugular venous distension and no carotid bruits   HEMATOLOGIC/LYMPHATIC: no cervical, supraclavicular or axillary lymphadenopathy   LUNGS: no increased work of breathing and clear to auscultation   CARDIOVASCULAR: regular rate and rhythm, normal S1 and S2, no murmur noted  ABDOMEN: normal bowel sounds x 4, soft, non-distended, non-tender, no masses palpated, no hepatosplenomegaly   MUSCULOSKELETAL: full range of motion noted, tone is normal  NEUROLOGIC: awake, alert, oriented to name, place and time. Motor skills grossly intact. Cranial nerves II through XII grossly intact. SKIN: No jaundice. appears intact. No petechial rash.    EXTREMITIES: no LE edema. No cyanosis. Labs:  Hematology:  Lab Results   Component Value Date    WBC 5.4 07/16/2020    RBC 5.96 (H) 07/16/2020    HGB 17.0 07/16/2020    HCT 52.1 07/16/2020    MCV 87.4 07/16/2020    MCH 28.5 07/16/2020    MCHC 32.6 07/16/2020    RDW 13.9 07/16/2020     07/16/2020    MPV 10.1 07/16/2020    BANDSPCT 15 (H) 03/15/2019    SEGSPCT 73.8 (H) 03/16/2019    EOSRELPCT 0.4 03/16/2019    BASOPCT 0.1 03/16/2019    LYMPHOPCT 9.7 (L) 03/16/2019    MONOPCT 15.6 (H) 03/16/2019    BANDABS 2.36 03/15/2019    SEGSABS 5.7 03/16/2019    EOSABS 0.0 03/16/2019    BASOSABS 0.0 03/16/2019    LYMPHSABS 0.8 03/16/2019    MONOSABS 1.2 03/16/2019    DIFFTYPE AUTOMATED DIFFERENTIAL 03/16/2019    ANISOCYTOSIS 1+ 03/15/2019    WBCMORP OCCASIONAL  OCCASIONAL VACULATED MONOCYTES   03/15/2019     Chemistry:  Lab Results   Component Value Date     07/16/2020    K 4.7 07/16/2020     07/16/2020    CO2 23 07/16/2020    BUN 16 07/16/2020    CREATININE 0.8 07/16/2020    GLUCOSE 104 (H) 07/16/2020    CALCIUM 9.3 07/16/2020    PROT 7.3 07/16/2020    LABALBU 4.4 07/16/2020    BILITOT 1.2 (H) 07/16/2020    ALKPHOS 75 07/16/2020    AST 27 07/16/2020    ALT 34 07/16/2020    LABGLOM >60 07/16/2020    GFRAA >60 07/16/2020    AGRATIO 1.5 07/16/2020    GLOB 2.9 07/16/2020     Lab Results   Component Value Date    TSHHS 3.950 03/14/2019     Immunology:  Lab Results   Component Value Date    PROT 7.3 07/16/2020    SPEP INTERPRETATION - NEGATIVE FOR MONOCLONAL BANDS. MM 03/19/2019    ALBUMINELP 2.8 (L) 03/19/2019    LABALPH 0.3 03/19/2019    LABALPH 0.9 03/19/2019    LABBETA 1.0 03/19/2019    GAMGLOB 1.1 03/19/2019     Lab Results   Component Value Date    KAPPAUVOL 1.82 03/19/2019    LAMBDAUVOL 1.51 03/19/2019    KLFLCR 1.21 03/19/2019    KLFLCR  03/19/2019     (NOTE)  Performed by [place] [id number], [id number] [facility] [phone]  www. Stephany Coelho MD, Lab.  Director       Coagulation Panel:  Lab Results   Component Value Date    PROTIME 11.4 07/05/2019    INR 1.00 07/05/2019    APTT 25.2 07/05/2019     Tumor Markers:  Lab Results   Component Value Date    PSA 6.92 (H) 04/01/2019      Observations:  PHQ-9 Total Score: 0 (12/8/2020  9:18 AM)      Assessment & Plan:    1. He has a biopsy of the T-8 vertebral body in March 2019 which was negative. MRI of T-spine was reviewed. T-8 biopsy path report showed cartilagenous tumor. 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 Active surveillance was recomemended. CT and MRI of thoracic spine in November 2019 at [facility] were reviewed. MRI of spine in April 2020 was reviewed. CT and MRI of the spine in November 2020 showed stable findings. He will follow-up with Colin Payan in 6 months. 2. He has elevated PSA which could be related to prostatitis. He had PSA  on December 7, 2020. He will follow-up with urologist tomorrow. 3. I advised him to keep age-appropriate cancer screening up-to-date. We discussed about healthy diet and lifestyle. 4. He saw dermatologist on July 23, 2019. Return to clinic in 6 months or sooner. All of his question has been answered for today. Recent imaging and labs were reviewed and discussed with the patient.       Electronically signed by Nilo Burt MD on 12/3/20 at 8:54 AM EST

## 2020-12-08 ENCOUNTER — HOSPITAL ENCOUNTER (OUTPATIENT)
Dept: INFUSION THERAPY | Age: 70
Discharge: HOME | End: 2020-12-08
Payer: MEDICARE

## 2020-12-08 ENCOUNTER — OFFICE VISIT (OUTPATIENT)
Dept: ONCOLOGY | Age: 70
End: 2020-12-08
Payer: MEDICARE

## 2020-12-08 VITALS
HEART RATE: 63 BPM | TEMPERATURE: 96.6 F | HEIGHT: 66 IN | BODY MASS INDEX: 32.98 KG/M2 | WEIGHT: 205.2 LBS | OXYGEN SATURATION: 91 % | SYSTOLIC BLOOD PRESSURE: 140 MMHG | DIASTOLIC BLOOD PRESSURE: 84 MMHG

## 2020-12-08 PROCEDURE — 99211 OFF/OP EST MAY X REQ PHY/QHP: CPT

## 2020-12-08 PROCEDURE — G8484 FLU IMMUNIZE NO ADMIN: HCPCS | Performed by: INTERNAL MEDICINE

## 2020-12-08 PROCEDURE — 4040F PNEUMOC VAC/ADMIN/RCVD: CPT | Performed by: INTERNAL MEDICINE

## 2020-12-08 PROCEDURE — 1036F TOBACCO NON-USER: CPT | Performed by: INTERNAL MEDICINE

## 2020-12-08 PROCEDURE — G8417 CALC BMI ABV UP PARAM F/U: HCPCS | Performed by: INTERNAL MEDICINE

## 2020-12-08 PROCEDURE — 3017F COLORECTAL CA SCREEN DOC REV: CPT | Performed by: INTERNAL MEDICINE

## 2020-12-08 PROCEDURE — 1123F ACP DISCUSS/DSCN MKR DOCD: CPT | Performed by: INTERNAL MEDICINE

## 2020-12-08 PROCEDURE — 99213 OFFICE O/P EST LOW 20 MIN: CPT | Performed by: INTERNAL MEDICINE

## 2020-12-08 PROCEDURE — G8427 DOCREV CUR MEDS BY ELIG CLIN: HCPCS | Performed by: INTERNAL MEDICINE

## 2020-12-08 ASSESSMENT — PATIENT HEALTH QUESTIONNAIRE - PHQ9
SUM OF ALL RESPONSES TO PHQ QUESTIONS 1-9: 0
SUM OF ALL RESPONSES TO PHQ9 QUESTIONS 1 & 2: 0
2. FEELING DOWN, DEPRESSED OR HOPELESS: 0
SUM OF ALL RESPONSES TO PHQ QUESTIONS 1-9: 0
1. LITTLE INTEREST OR PLEASURE IN DOING THINGS: 0
SUM OF ALL RESPONSES TO PHQ QUESTIONS 1-9: 0

## 2020-12-08 NOTE — PROGRESS NOTES
MA Rooming Questions  Patient: Elke Castillo  MRN: [id number]    Date: 12/8/2020        1. Do you have any new issues?   no         2. Do you need any refills on medications?    no    3. Have you had any imaging done since your last visit? yes - PSA    4. Have you been hospitalized or seen in the emergency room since your last visit here?   no    5. Did the patient have a depression screening completed today?  Yes    PHQ-9 Total Score: 0 (12/8/2020  9:18 AM)       PHQ-9 Given to (if applicable):               PHQ-9 Score (if applicable):                     [] Positive     []  Negative              Does question #9 need addressed (if applicable)                     [] Yes    []  No               Joselito John MA

## 2020-12-10 RX ORDER — AMLODIPINE BESYLATE 5 MG/1
5 TABLET ORAL DAILY
Qty: 30 TABLET | Refills: 5 | Status: SHIPPED | OUTPATIENT
Start: 2020-12-10 | End: 2021-06-09

## 2021-01-15 ENCOUNTER — OFFICE VISIT (OUTPATIENT)
Dept: FAMILY MEDICINE CLINIC | Age: 71
End: 2021-01-15
Payer: MEDICARE

## 2021-01-15 VITALS
WEIGHT: 203.2 LBS | OXYGEN SATURATION: 94 % | BODY MASS INDEX: 32.66 KG/M2 | HEIGHT: 66 IN | TEMPERATURE: 98.3 F | HEART RATE: 68 BPM | DIASTOLIC BLOOD PRESSURE: 76 MMHG | SYSTOLIC BLOOD PRESSURE: 134 MMHG

## 2021-01-15 DIAGNOSIS — G25.81 RESTLESS LEG SYNDROME: ICD-10-CM

## 2021-01-15 DIAGNOSIS — F41.9 ANXIETY: Primary | ICD-10-CM

## 2021-01-15 DIAGNOSIS — I10 ESSENTIAL HYPERTENSION: ICD-10-CM

## 2021-01-15 DIAGNOSIS — J42 CHRONIC BRONCHITIS, UNSPECIFIED CHRONIC BRONCHITIS TYPE (HCC): ICD-10-CM

## 2021-01-15 DIAGNOSIS — I25.10 CORONARY ARTERY DISEASE INVOLVING NATIVE CORONARY ARTERY OF NATIVE HEART WITHOUT ANGINA PECTORIS: ICD-10-CM

## 2021-01-15 DIAGNOSIS — K21.9 GASTROESOPHAGEAL REFLUX DISEASE WITHOUT ESOPHAGITIS: ICD-10-CM

## 2021-01-15 PROCEDURE — G8427 DOCREV CUR MEDS BY ELIG CLIN: HCPCS | Performed by: FAMILY MEDICINE

## 2021-01-15 PROCEDURE — 1036F TOBACCO NON-USER: CPT | Performed by: FAMILY MEDICINE

## 2021-01-15 PROCEDURE — G8926 SPIRO NO PERF OR DOC: HCPCS | Performed by: FAMILY MEDICINE

## 2021-01-15 PROCEDURE — 1123F ACP DISCUSS/DSCN MKR DOCD: CPT | Performed by: FAMILY MEDICINE

## 2021-01-15 PROCEDURE — 3017F COLORECTAL CA SCREEN DOC REV: CPT | Performed by: FAMILY MEDICINE

## 2021-01-15 PROCEDURE — 4040F PNEUMOC VAC/ADMIN/RCVD: CPT | Performed by: FAMILY MEDICINE

## 2021-01-15 PROCEDURE — 99214 OFFICE O/P EST MOD 30 MIN: CPT | Performed by: FAMILY MEDICINE

## 2021-01-15 PROCEDURE — 3023F SPIROM DOC REV: CPT | Performed by: FAMILY MEDICINE

## 2021-01-15 PROCEDURE — G8482 FLU IMMUNIZE ORDER/ADMIN: HCPCS | Performed by: FAMILY MEDICINE

## 2021-01-15 PROCEDURE — G8417 CALC BMI ABV UP PARAM F/U: HCPCS | Performed by: FAMILY MEDICINE

## 2021-01-15 RX ORDER — ATORVASTATIN CALCIUM 40 MG/1
40 TABLET, FILM COATED ORAL DAILY
Qty: 30 TABLET | Refills: 5 | Status: SHIPPED | OUTPATIENT
Start: 2021-01-15 | End: 2021-07-07 | Stop reason: SDUPTHER

## 2021-01-15 ASSESSMENT — PATIENT HEALTH QUESTIONNAIRE - PHQ9
SUM OF ALL RESPONSES TO PHQ QUESTIONS 1-9: 0
SUM OF ALL RESPONSES TO PHQ QUESTIONS 1-9: 0
2. FEELING DOWN, DEPRESSED OR HOPELESS: 0
1. LITTLE INTEREST OR PLEASURE IN DOING THINGS: 0

## 2021-01-15 ASSESSMENT — ENCOUNTER SYMPTOMS
CHEST TIGHTNESS: 0
VOMITING: 0
SHORTNESS OF BREATH: 0
EYE PAIN: 0
ABDOMINAL PAIN: 0
TROUBLE SWALLOWING: 0
WHEEZING: 0
NAUSEA: 0
BLOOD IN STOOL: 0
DIARRHEA: 0

## 2021-01-15 NOTE — PROGRESS NOTES
1/15/2021    Tila Granger    Chief Complaint   Patient presents with    3 Month Follow-Up       HPI  History was obtained from the patient. Ruth Olivas is a 69 y.o. male who presents today with follow-up on anxiety, GERD, hypertension, restless leg symptoms, mild COPD, and coronary artery disease. He denies chest pain or shortness of breath does follow closely with cardiology GERD symptoms are currently well controlled restless leg symptoms do flare at times. No increased shortness of breath or wheezing reported at this point. Labs in July 2020 were satisfactory and immunizations are up-to-date. He does continue to work full-time but hopes to go to part-time in the summer. REVIEW OF SYMPTOMS    Review of Systems   Constitutional: Negative for activity change and fatigue. HENT: Negative for congestion, hearing loss, mouth sores and trouble swallowing. Eyes: Negative for pain and visual disturbance. Respiratory: Negative for chest tightness, shortness of breath and wheezing. Cardiovascular: Negative for chest pain and palpitations. Gastrointestinal: Negative for abdominal pain, blood in stool, diarrhea, nausea and vomiting. Endocrine: Negative for polydipsia and polyuria. Genitourinary: Negative for dysuria, frequency and urgency. Musculoskeletal: Negative for arthralgias, gait problem and neck stiffness. Skin: Negative for rash. Allergic/Immunologic: Negative for environmental allergies. Neurological: Negative for dizziness, seizures, speech difficulty and weakness. Patient with restless leg symptoms. Hematological: Does not bruise/bleed easily. Psychiatric/Behavioral: Negative for agitation, confusion and hallucinations. The patient is nervous/anxious.         PAST MEDICAL HISTORY  Past Medical History:   Diagnosis Date    Abnormal CT scan per pt \"did CT scan and found spot on spine( T 8 lesion) back in March( 2019) did bx but did not show anything and now ( 7/5/2019) going to try do another bx\"    BPH (benign prostatic hyperplasia)     CAD (coronary artery disease)     follows with Dr Epi Woodward    Chronic cough 2/18/2020    COPD (chronic obstructive pulmonary disease) ([address] 4/5/2019    follow with Dr Marina Wiggnis Coronary arteriosclerosis     Excessive daytime sleepiness 4/5/2019    Family history of coronary artery disease     Former smoker 8/17/2020    GERD (gastroesophageal reflux disease)     H/O cardiac catheterization 03-    ([facility]) Total occ. LAD w/mild disease of LM and RCA.     H/O cardiovascular stress test 02-    (Exercise) EF 58%. Normal. Exercise cap. 11.0 METS.    H/O cardiovascular stress test 05-    (Cardiolite) EF 63%. Good exercise capacity. Hypertensive blood pressure response tp exercise. ETT neg for ischemia or arrhythmia. Cardiolite perfusion imaging reveals Ant. wall soft tissue attenuation artifact and mild ischemia of Inf. wall.  H/O colonoscopy with polypectomy 02-    Polyp in Rectum    H/O Doppler ultrasound 03-    (Carotid) No anatomical abnormalities. Normal    H/O echocardiogram 02- 6/14 EF 50-55%  abn LV diast stage 1 2/10EF 50-55%. Abn. LV Diastolic function Stage 1. Left Atrium borderline Dilated.  H/O gastritis     Years Ago    History of cardiac monitoring 11/18/2017    30 day monitoring: normal, Sinus rhythm noted. no symptoms and no a fib or arrhythmia noted    History of complete ECG 04-    [place] (hard of hearing)     noé hearing aides    HTN (hypertension)     Hx of cardiovascular stress test 10/28/2015    cardiolite-mild ischemia apical,EF64%    Hx of echocardiogram 6/9/2016    EF 55-60%. LA is mildly dilated. RV is mildly dilated. Mild MR. Mild tricuspid insufficiency. Significant VHD is absent.      Hyperlipidemia     Narcolepsy  Nocturnal hypoxemia 2019    Obstructive sleep apnea 2019    \"had sleep study done and they said I do not have sleep apnea, put me on oxygen at night \"    On home oxygen therapy     2l/nc at night only    Periodic limb movements of sleep 2019    Polyp of colon     Reflux     RLS (restless legs syndrome)     S/P CABG x 1 2002    Stricture of esophagus     S/P Dilation (Dr. Akhil Meza)    UTI (urinary tract infection) 2019    Wears glasses        FAMILY HISTORY  Family History   Problem Relation Age of Onset    Heart Disease Mother         Pacemaker, MVR, CABG    Stroke Mother     Cancer Father         lung ca- smoker       SOCIAL HISTORY  Social History     Socioeconomic History    Marital status:      Spouse name: Not on file    Number of children: 2    Years of education: Not on file    Highest education level: Not on file   Occupational History    Not on file   Social Needs    Financial resource strain: Not on file    Food insecurity     Worry: Not on file     Inability: Not on file   [employer] needs     Medical: Not on file     Non-medical: Not on file   Tobacco Use    Smoking status: Former Smoker     Packs/day: 0.00     Years: 10.00     Pack years: 0.00     Types: Pipe     Quit date: 1985     Years since quittin.0    Smokeless tobacco: Never Used   Substance and Sexual Activity    Alcohol use:  Yes     Alcohol/week: 0.0 standard drinks     Comment: occ alcohol/ caffeine 2 cups of coffee a day    Drug use: No    Sexual activity: Not on file   Lifestyle    Physical activity     Days per week: Not on file     Minutes per session: Not on file    Stress: Not on file   Relationships    Social connections     Talks on phone: Not on file     Gets together: Not on file     Attends Orthodox service: Not on file     Active member of club or organization: Not on file     Attends meetings of clubs or organizations: Not on file Relationship status: Not on file    Intimate partner violence     Fear of current or ex partner: Not on file     Emotionally abused: Not on file     Physically abused: Not on file     Forced sexual activity: Not on file   Other Topics Concern    Not on file   Social History Narrative    Not on file        SURGICAL HISTORY  Past Surgical History:   Procedure Laterality Date    BONE BIOPSY      per old chart bx T 8 3/2019    BONE BIOPSY N/A 07/05/2019    IR T-8    COLONOSCOPY  last one 2016 2008    CORONARY ARTERY BYPASS GRAFT  03-    x 1 - [place] to LAD - Dr. Radha Miguel Right 2019    TONSILLECTOMY  as a kid   [address]  Current Outpatient Medications   Medication Sig Dispense Refill    atorvastatin (LIPITOR) 40 MG tablet Take 1 tablet by mouth daily 30 tablet 5    amLODIPine (NORVASC) 5 MG tablet Take 1 tablet by mouth daily 30 tablet 5    sertraline (ZOLOFT) 50 MG tablet TAKE ONE (1) TABLET BY MOUTH  ONCE DAILY 30 tablet 4    ezetimibe (ZETIA) 10 MG tablet Take 1 tablet by mouth daily 30 tablet 6    omeprazole (PRILOSEC) 40 MG delayed release capsule Take 1 capsule by mouth daily 30 capsule 5    pramipexole (MIRAPEX) 0.25 MG tablet Take 1 tablet by mouth nightly 30 tablet 5    aspirin 81 MG EC tablet Take 81 mg by mouth daily. No current facility-administered medications for this visit. ALLERGIES  No Known Allergies    PHYSICAL EXAM    /76 (Site: Right Upper Arm, Position: Sitting, Cuff Size: Medium Adult)   Pulse 68   Temp 98.3 °F (36.8 °C)   Ht 5' 6\" (1.676 m)   Wt 203 lb 3.2 oz (92.2 kg)   SpO2 94%   BMI 32.80 kg/m²     Physical Exam  Vitals signs and nursing note reviewed. Constitutional:       General: He is not in acute distress. Appearance: He is well-developed. He is not ill-appearing. HENT:      Head: Normocephalic and atraumatic.       Nose: Nose normal.      Mouth/Throat: Mouth: Mucous membranes are moist.      Pharynx: Oropharynx is clear. Eyes:      Pupils: Pupils are equal, round, and reactive to light. Neck:      Musculoskeletal: Normal range of motion and neck supple. Cardiovascular:      Rate and Rhythm: Normal rate and regular rhythm. Heart sounds: Normal heart sounds. Pulmonary:      Effort: Pulmonary effort is normal. No respiratory distress. Breath sounds: Normal breath sounds. No wheezing, rhonchi or rales. Abdominal:      Palpations: Abdomen is soft. Musculoskeletal: Normal range of motion. Skin:     General: Skin is warm and dry. Findings: No rash. Neurological:      General: No focal deficit present. Mental Status: He is alert and oriented to person, place, and time. Mental status is at baseline. Cranial Nerves: No cranial nerve deficit. Motor: No weakness. Gait: Gait normal.   Psychiatric:         Mood and Affect: Mood normal.         Behavior: Behavior normal.         Thought Content: Thought content normal.         ASSESSMENT & PLAN     Diagnosis Orders   1. Anxiety     2. Coronary artery disease involving native coronary artery of native heart without angina pectoris     3. Chronic bronchitis, unspecified chronic bronchitis type ([address]     4. Gastroesophageal reflux disease without esophagitis     5. Essential hypertension     6. Restless leg syndrome     Refills provided and questions answered. He is to continue social distancing and exercise as possible to get Covid virus vaccine when it becomes available. Continue on overall regimen the same at this point- keep appointment with subspecialists as they direct. He will call with questions or problems. Return in about 6 months (around 7/15/2021).          Electronically signed by Sophia Cornejo MD on 1/15/2021

## 2021-02-17 ENCOUNTER — OFFICE VISIT (OUTPATIENT)
Dept: CARDIOLOGY CLINIC | Age: 71
End: 2021-02-17
Payer: MEDICARE

## 2021-02-17 VITALS
BODY MASS INDEX: 33.52 KG/M2 | DIASTOLIC BLOOD PRESSURE: 70 MMHG | HEIGHT: 66 IN | WEIGHT: 208.6 LBS | HEART RATE: 70 BPM | SYSTOLIC BLOOD PRESSURE: 120 MMHG | TEMPERATURE: 98.1 F

## 2021-02-17 DIAGNOSIS — Z95.1 S/P CABG X 1: ICD-10-CM

## 2021-02-17 DIAGNOSIS — Z82.49 FAMILY HISTORY OF CORONARY ARTERY DISEASE: ICD-10-CM

## 2021-02-17 DIAGNOSIS — I10 ESSENTIAL HYPERTENSION: ICD-10-CM

## 2021-02-17 DIAGNOSIS — E78.5 HYPERLIPIDEMIA, UNSPECIFIED HYPERLIPIDEMIA TYPE: ICD-10-CM

## 2021-02-17 DIAGNOSIS — G25.81 RESTLESS LEG SYNDROME: ICD-10-CM

## 2021-02-17 DIAGNOSIS — K21.9 GASTROESOPHAGEAL REFLUX DISEASE WITHOUT ESOPHAGITIS: ICD-10-CM

## 2021-02-17 DIAGNOSIS — I25.10 CORONARY ARTERIOSCLEROSIS: ICD-10-CM

## 2021-02-17 DIAGNOSIS — I25.10 CORONARY ARTERY DISEASE INVOLVING NATIVE CORONARY ARTERY OF NATIVE HEART WITHOUT ANGINA PECTORIS: Primary | ICD-10-CM

## 2021-02-17 PROCEDURE — G8427 DOCREV CUR MEDS BY ELIG CLIN: HCPCS | Performed by: INTERNAL MEDICINE

## 2021-02-17 PROCEDURE — 4040F PNEUMOC VAC/ADMIN/RCVD: CPT | Performed by: INTERNAL MEDICINE

## 2021-02-17 PROCEDURE — G8417 CALC BMI ABV UP PARAM F/U: HCPCS | Performed by: INTERNAL MEDICINE

## 2021-02-17 PROCEDURE — 99214 OFFICE O/P EST MOD 30 MIN: CPT | Performed by: INTERNAL MEDICINE

## 2021-02-17 PROCEDURE — 1036F TOBACCO NON-USER: CPT | Performed by: INTERNAL MEDICINE

## 2021-02-17 PROCEDURE — G8482 FLU IMMUNIZE ORDER/ADMIN: HCPCS | Performed by: INTERNAL MEDICINE

## 2021-02-17 PROCEDURE — 3017F COLORECTAL CA SCREEN DOC REV: CPT | Performed by: INTERNAL MEDICINE

## 2021-02-17 PROCEDURE — 1123F ACP DISCUSS/DSCN MKR DOCD: CPT | Performed by: INTERNAL MEDICINE

## 2021-02-17 RX ORDER — EZETIMIBE 10 MG/1
10 TABLET ORAL DAILY
Qty: 30 TABLET | Refills: 6 | Status: SHIPPED | OUTPATIENT
Start: 2021-02-17 | End: 2021-08-16 | Stop reason: SDUPTHER

## 2021-02-17 NOTE — LETTER
Dari Carpenter  1950  [id number]    Have you had any Chest Pain that is not new? - No          Have you had any Shortness of Breath - No      Have you had any dizziness - No  ?     Have you had any palpitations that are not new? - No       Is the patient on any of the following medications -   If Yes DO EKG - Needs done every 6 months    Do you have any edema - swelling in No    If Yes - CHECK TO SEE IF THE EDEMA IS PITTING  How long have they been having edema - no  If Yes - Have they worn compression stockings No    Vein \"LEG PROBLEM Questionnaire\"  1. Do you have prominent leg veins? No   2. Do you have any skin discoloration? No  3. Do you have any healed or active sores? No  4. Do you have swelling of the legs? No  5. Do you have a family history of varicose veins? No  6. Does your profession involve pro-longed        standing or heavy lifting? No  7. Have you been fighting overweight problems? No  8. Do you have restless legs? No  9. Do you have any night time cramps? No  10. Do you have any of the following in your legs:        none     Do you have a surgery or procedure scheduled in the near future - Yes  If Yes- DO EKG  If Yes - Who is the surgery with? Dr. Rusty Perera   Phone number of physician [phone]  Fax number of physician   Type of surgery tooth being pull March 16, 2021   GIVE THIS INFORMATION TO ZANDRA BLANCHARD    ? Ask patient if they [facility] if they are not already signed [facility]    ? Check to see if we have an E-MAIL on file for the patient    ? Check medication list thoroughly!!! AND RECONCILE OUTSIDE MEDICATIONS  If dose has changed change the entire order not just the MG  BE SURE TO ASK PATIENT IF THEY NEED MEDICATION REFILLS    ?  At check out add to every patient's \"wrap up\" the following dot phrase AFTERHOURSEDUCATION and ensure we explain this to our patients

## 2021-02-17 NOTE — LETTER
[facility] [address]  [address] [address]  Phone: [phone]  Fax: [phone]    Lyric Isbell MD        February 17, 2021     Alma Whittaker, [address] [place] [id number]    Patient: Chrissy Branch  MR Number: [id number]  YOB: 1950  Date of Visit: 2/17/2021    Dear Dr. Alma Whittaker:    Thank you for the request for consultation for Joaquin Weinberg to me for the evaluation of CAD. Below are the relevant portions of my assessment and plan of care. If you have questions, please do not hesitate to call me. I look forward to following Sami Dears along with you.     Sincerely,        Lyric Isbell MD

## 2021-02-17 NOTE — PROGRESS NOTES
OFFICE PROGRESS NOTES      Heath Hernández is a 69 y.o. male who has    CHIEF COMPLAINT AS FOLLOWS:   CHEST PAIN: Patient denies any C/O chest pains at this time.      SOB: Has SOB with exertion but no change over previous noted.                 LEG EDEMA: No leg edema   PALPITATIONS: Denies any C/O Palpitations                                   DIZZINESS: No C/O Dizziness                           SYNCOPE: None   OTHER:                                     HPI: Patient is here for F/U on his CAD, HTN & Dyslipidemia problems. CAD: Patient has known Hx of  CAD. Had angioplasty / CABG in the past.  HTN: Patient has known Hx of essential HTN. Has been treated with guideline recommended medical / physical/ diet therapy as stated below. Dyslipidemia: Patient has known Hx of mixed dyslipidemia. Has been treated with guideline recommended medical / physical/ diet therapy as stated below. He does not have any new complaints at this time. Current Outpatient Medications   Medication Sig Dispense Refill    atorvastatin (LIPITOR) 40 MG tablet Take 1 tablet by mouth daily 30 tablet 5    amLODIPine (NORVASC) 5 MG tablet Take 1 tablet by mouth daily 30 tablet 5    sertraline (ZOLOFT) 50 MG tablet TAKE ONE (1) TABLET BY MOUTH  ONCE DAILY 30 tablet 4    ezetimibe (ZETIA) 10 MG tablet Take 1 tablet by mouth daily 30 tablet 6    omeprazole (PRILOSEC) 40 MG delayed release capsule Take 1 capsule by mouth daily 30 capsule 5    pramipexole (MIRAPEX) 0.25 MG tablet Take 1 tablet by mouth nightly 30 tablet 5    aspirin 81 MG EC tablet Take 81 mg by mouth daily. No current facility-administered medications for this visit. Allergies: Patient has no known allergies.   Review of Systems:    Constitutional: Negative for diaphoresis and fatigue  Respiratory: Negative for shortness of breath Cardiovascular: Negative for chest pain, dyspnea on exertion, claudication, edema, irregular heartbeat, murmur, palpitations or shortness of breath  Musculoskeletal: Negative for muscle pain, muscular weakness, negative for pain in arm and leg or swelling in foot and leg    Objective:  /70 (Site: Left Upper Arm, Position: Sitting, Cuff Size: Medium Adult)   Pulse 70   Temp 98.1 °F (36.7 °C)   Ht 5' 6\" (1.676 m)   Wt 208 lb 9.6 oz (94.6 kg)   BMI 33.67 kg/m²   Wt Readings from Last 3 Encounters:   02/17/21 208 lb 9.6 oz (94.6 kg)   01/15/21 203 lb 3.2 oz (92.2 kg)   12/08/20 205 lb 3.2 oz (93.1 kg)     Body mass index is 33.67 kg/m². GENERAL - Alert, oriented, pleasant, in no apparent distress. EYES: No jaundice, no conjunctival pallor. Neck - Supple. No jugular venous distention noted. No carotid bruits. Cardiovascular  Normal S1 and S2 without obvious murmur or gallop. Extremities - No cyanosis, clubbing, or significant edema. Pulmonary  No respiratory distress. No wheezes or rales.       Lab Review   Lab Results   Component Value Date    TROPONINT <0.010 03/14/2019     Lab Results   Component Value Date    PROBNP 632.8 03/14/2019     Lab Results   Component Value Date    INR 1.00 07/05/2019    INR 1.07 03/19/2019     Lab Results   Component Value Date    LABA1C 6.3 03/16/2019     Lab Results   Component Value Date    WBC 5.4 07/16/2020    WBC 4.9 07/05/2019    HCT 52.1 07/16/2020    HCT 48.0 07/05/2019    MCV 87.4 07/16/2020    MCV 86.2 07/05/2019     07/16/2020     07/05/2019     Lab Results   Component Value Date    CHOL 122 07/16/2020    CHOL 149 08/21/2019    TRIG 129 07/16/2020    TRIG 145 08/21/2019    HDL 40 07/16/2020    HDL 49 08/21/2019    LDLCALC 56 07/16/2020    LDLCALC 71 08/21/2019    LDLDIRECT 67 02/08/2012    LDLDIRECT 66 02/16/2011     Lab Results   Component Value Date    ALT 34 07/16/2020    ALT 41 (H) 08/21/2019    AST 27 07/16/2020    AST 26 08/21/2019 BMP:    Lab Results   Component Value Date     07/16/2020     08/21/2019    K 4.7 07/16/2020    K 4.2 08/21/2019     07/16/2020     08/21/2019    CO2 23 07/16/2020    CO2 25 08/21/2019    BUN 16 07/16/2020    BUN 18 08/21/2019    CREATININE 0.8 07/16/2020    CREATININE 0.8 08/21/2019     CMP:   Lab Results   Component Value Date     07/16/2020     08/21/2019    K 4.7 07/16/2020    K 4.2 08/21/2019     07/16/2020     08/21/2019    CO2 23 07/16/2020    CO2 25 08/21/2019    BUN 16 07/16/2020    BUN 18 08/21/2019    CREATININE 0.8 07/16/2020    CREATININE 0.8 08/21/2019    PROT 7.3 07/16/2020    PROT 6.7 08/21/2019     Lab Results   Component Value Date    TSH 2.3 10/14/2016    TSH 2.4 08/16/2013    TSHHS 3.950 03/14/2019     Cardiolite Normal 10/2017    3/2019 ECHO:   Left ventricular systolic function is normal.   Ejection fraction is visually estimated at 50-55%. Mild left ventricular hypertrophy. Grade I diastolic dysfunction. Mildly dilated left atrium. No evidence of any pericardial effusion. QUALITY MEASURES REVIEWED:  1.CAD:Patient is taking anti platelet agent:Yes  2. DYSLIPIDEMIA: Patient is on cholesterol lowering medication:Yes   3. Beta-Blocker therapy for CAD, if prior Myocardial Infarction:No   4. Counselled regarding smoking cessation. No   Patient does not Smoke. 5.Anticoagulation therapy (for A.Fib) No   Does Not have A.Fib.  6.Discussed weight management strategies. Assessment & Plan:    Primary / Secondary prevention is the goal by aggressive risk modification, healthy and therapeutic life style changes for cardiovascular risk reduction. Various goals are discussed and multiple questions answered. CAD:Yes   clinically stable. Patient is on optimal medical regimen ( see medication list above )  - Angelique Pedroza is currently  asymptomatic from CAD.             - changes in  treatment:   no          - Testing ordered:  no  [place] classification: 1  [place] RISK SCORE:  [place] RISK SCORE:  HTN:well controlled on current medical regimen, see list above.              - changes in  treatment:   no   CARDIOMYOPATHY: None known   CONGESTIVE HEART FAILURE: NO KNOWN HISTORY.      VHD: No significant VHD noted  DYSLIPIDEMIA: Patient's profile is at / near [facility] current medical regimen wellyes,                                                            See most recent Lab values in Labs section above. OTHER RELEVANT DIAGNOSIS:as noted in patient's active problem list:  TESTS ORDERED: none this visit.  Luciano Brunson previously ordered tests reviewed.  MEDICATIONS: CPM   Office f/u in six months.

## 2021-02-17 NOTE — PATIENT INSTRUCTIONS
Please be informed that if you contact our office outside of normal business hours the physician on call cannot help with any scheduling or rescheduling issues, procedure instruction questions or any type of medication issue. We advise you for any urgent/emergency that you go to the nearest emergency room! PLEASE CALL OUR OFFICE DURING NORMAL BUSINESS HOURS    Monday - Friday   8 am to 5 pm    [place]: [phone]    [place]: [phone]    [place]:  [phone]  CAD:Yes   clinically stable. Patient is on optimal medical regimen ( see medication list above )  - Lei Montelongo is currently  asymptomatic from CAD.          - changes in  treatment:   no           - Testing ordered:  no  [place] classification: 1  [place] RISK SCORE:  [place] RISK SCORE:  HTN:well controlled on current medical regimen, see list above.              - changes in  treatment:   no   CARDIOMYOPATHY: None known   CONGESTIVE HEART FAILURE: NO KNOWN HISTORY.      VHD: No significant VHD noted  DYSLIPIDEMIA: Patient's profile is at / near [facility] current medical regimen wellyes,                                                            See most recent Lab values in Labs section above. OTHER RELEVANT DIAGNOSIS:as noted in patient's active problem list:  TESTS ORDERED: none this visit.  Linda Mata previously ordered tests reviewed.  MEDICATIONS: CPM   Office f/u in six months.

## 2021-05-10 NOTE — PROGRESS NOTES
Patient Name: Lenora Garrido  Patient : 1950  Patient MRN: [id number]     Primary Oncologist: Mark Bernstein MD  Referring Provider: Candance Bury, MD     Date of Service: 2021      Chief Complaint:   Chief Complaint   Patient presents with   [address]     He came in for follow-up visit. Patient Active Problem List:     CAD (coronary artery disease)     Hyperlipidemia     GERD (gastroesophageal reflux disease)     S/P CABG x 1     HTN (hypertension)     Family history of coronary artery disease     Iron deficiency anemia due to chronic blood loss     Acute respiratory failure with hypoxia ([address]     Community acquired pneumonia     Dyspnea     Excessive daytime sleepiness     COPD (chronic obstructive pulmonary disease) (HCC)     Periodic limb movements of sleep     Tumor T8 (hyaline cartilage)     Anxiety     Narcolepsy     Coronary arteriosclerosis     Chronic cough     Abnormal findings on diagnostic imaging of musculoskeletal system     Restless leg syndrome     Former smoker     HPI:   Geraldine Davidson is a pleasant 69 y.o.  male patient with significant past medical history of BPH, HTN who was hospitalized in 2019 due to fever. He was diagnosed with BPH/UTI. He was found to have elevated elevated PSA and has been followed by urologist.  CT chest:  1. No acute pulmonary artery embolism. 2. Bibasilar atelectasis. 3. Lytic destructive appearance of the T8 vertebral body. This is considered metastatic until proven otherwise. Recommend whole body bone scan to assess for metabolic activity and potential metachronous lesion. He has history of fall off roof more than 40 years ago. Bone scan : Amorphous radiotracer accumulation associated with the posterior elements of T8 corresponding to the expansile lytic destructive lesion seen on recent CT. No additional foci of abnormal radiotracer accumulation throughout the skeleton. He had bone biopsy on 2019.   Pathology report revealed:  Bone, T8 vertebra, lytic lesion; CT guided needle biopsy: - Small fragments of cartilage, bone, skeletal muscle and soft tissue showing no significant histopathologic change. Flow cytometry study was not significant. SPEP, serum light chain study were unremarkable. He will follow-up with urologist as an outpatient. I plan to have follow-up imaging study in 3 months. He is currently asymptomatic. MRI of T-spine in June 2019 : non specific abnormal marrow signal of T-8. After discussing with patient and radiologist, patient is agreeable to have repeat biopsy of T-8 from the pedicles or vertebral body. On July 17, 2019 he came in for follow up visit. He had colonoscopy in July 2019. Repeat in 2021. 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 Awaiting for official report from [employer]. For the time being I referred to spine surgeon at [facility]. He was seen by Dr Ella Robins, sarcoma specialist at [facility] who recommended closed surveillance. 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 CT cervical, thoracic and lumbar spine in August 2019 : expansile, mixed lytic and sclerotic lesion of T8 involving the posterior element and R pedicle and R posterior and central aspect of vertebral body, consistent with chondroid type tumor. Reportedly PSA has improved and he is followed by Dr Fran Hernadez. CT and MRI of thoracic spine in November 2019 were reviewed, grossly stable. MRI of thoracic spine in April 2020 showed stable expansile chondroid type lesion within the posterior element of T8. On December 8, 2020 he came in for follow-up visit. CT and MRI of the thoracic spine in November 2020 were reviewed and stable. He will follow-up with Braydon Christie in 6 months. On June 8, 2021 he came in for follow-up visit.   MRI of the spine on May 11, 2021 showed : unchanged expansile lesion within the posterior T8 vertebral body and posterior elements. Follow-up in 1 year was recommended by the neurosurgeon at [facility]. He has Covid vaccine. Currently he is asymptomatic. No nausea, vomiting or diarrhea. He denies any weight loss or night sweats. No fever or chills. He denies any depression. He denied any back pain. Past medical history:  Coronary artery disease, BPH, dyslipidemia, hypertension, acid reflux. Past surgical history:  Colonoscopy with removal of polyp from rectum in February 2008. CABG in March 2002, tonsillectomy, vasectomy in 1976. Biopsy of the T-8 in March 2019. Social history:  He quit smoking more than 34 years ago. He denies any alcohol or illicit drug use. He has 2 biological children and one stepdaughter. Family history:  Father had lung cancer. Review of Systems: \"Per interval history; otherwise 10 point ROS is negative. \"     Vital Signs:  BP (!) 141/71 (Site: Right Upper Arm, Position: Sitting, Cuff Size: Large Adult)   Pulse 59   Temp 97.2 °F (36.2 °C) (Infrared)   Resp 16   Ht 5' 6\" (1.676 m)   Wt 191 lb 3.2 oz (86.7 kg)   SpO2 95%   BMI 30.86 kg/m²     Physical Exam:  CONSTITUTIONAL: awake, alert, cooperative, no apparent distress   EYES: pupils equal, round and reactive to light, sclera clear and conjunctiva normal  ENT: Normocephalic, without obvious abnormality, atraumatic  NECK: supple, symmetrical, no jugular venous distension and no carotid bruits   HEMATOLOGIC/LYMPHATIC: no cervical, supraclavicular or axillary lymphadenopathy   LUNGS: no increased work of breathing and clear to auscultation   CARDIOVASCULAR: regular rate and rhythm, normal S1 and S2, no murmur noted  ABDOMEN: normal bowel sounds, soft, non-distended, non-tender, no masses palpated, no hepatosplenomegaly   MUSCULOSKELETAL: full range of motion noted, tone is normal  NEUROLOGIC: awake, alert, oriented to name, place and time. Motor skills grossly intact.    Cranial nerves II through XII grossly intact. SKIN: No jaundice. appears intact. No petechial rash. EXTREMITIES: no LE edema or cyanosis. Labs:  Hematology:  Lab Results   Component Value Date    WBC 6.0 05/28/2021    RBC 5.32 05/28/2021    HGB 16.0 05/28/2021    HCT 46.0 05/28/2021    MCV 86.4 05/28/2021    MCH 30.0 05/28/2021    MCHC 34.7 05/28/2021    RDW 13.2 05/28/2021     05/28/2021    MPV 9.5 05/28/2021    BANDSPCT 15 (H) 03/15/2019    SEGSPCT 73.8 (H) 03/16/2019    EOSRELPCT 5.5 05/28/2021    BASOPCT 0.7 05/28/2021    LYMPHOPCT 29.2 05/28/2021    MONOPCT 11.4 05/28/2021    BANDABS 2.36 03/15/2019    SEGSABS 5.7 03/16/2019    EOSABS 0.3 05/28/2021    BASOSABS 0.0 05/28/2021    LYMPHSABS 1.7 05/28/2021    MONOSABS 0.7 05/28/2021    DIFFTYPE AUTOMATED DIFFERENTIAL 03/16/2019    ANISOCYTOSIS 1+ 03/15/2019    WBCMORP OCCASIONAL  OCCASIONAL VACULATED MONOCYTES   03/15/2019     Chemistry:  Lab Results   Component Value Date     05/28/2021    K 4.5 05/28/2021     05/28/2021    CO2 23 05/28/2021    BUN 12 05/28/2021    CREATININE 0.9 05/28/2021    GLUCOSE 91 05/28/2021    CALCIUM 9.3 05/28/2021    PROT 7.0 05/28/2021    LABALBU 4.3 05/28/2021    BILITOT 0.6 05/28/2021    ALKPHOS 73 05/28/2021    AST 21 05/28/2021    ALT 31 05/28/2021    LABGLOM >60 05/28/2021    GFRAA >60 05/28/2021    AGRATIO 1.6 05/28/2021    GLOB 2.7 05/28/2021     Lab Results   Component Value Date    TSHHS 3.950 03/14/2019     Immunology:  Lab Results   Component Value Date    PROT 7.0 05/28/2021    SPEP INTERPRETATION - NEGATIVE FOR MONOCLONAL BANDS.  MM 03/19/2019    ALBUMINELP 2.8 (L) 03/19/2019    LABALPH 0.3 03/19/2019    LABALPH 0.9 03/19/2019    LABBETA 1.0 03/19/2019    GAMGLOB 1.1 03/19/2019     Lab Results   Component Value Date    KAPPAUVOL 1.82 03/19/2019    LAMBDAUVOL 1.51 03/19/2019    KLFLCR 1.21 03/19/2019    KLFLCR  03/19/2019     (NOTE)  Performed by [facility], [address] [facility] [phone]  www. Louie Seals MD, Lab. Director       Coagulation Panel:  Lab Results   Component Value Date    PROTIME 11.4 07/05/2019    INR 1.00 07/05/2019    APTT 25.2 07/05/2019     Tumor Markers:  Lab Results   Component Value Date    PSA 6.92 (H) 04/01/2019      Observations:  PHQ-9 Total Score: 0 (6/8/2021  9:10 AM)      Assessment & Plan:    1. He has a biopsy of the T-8 vertebral body in March 2019 which was negative. MRI of T-spine was reviewed. T-8 biopsy path report showed cartilagenous tumor. 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 Active surveillance was recomemended. CT and MRI of thoracic spine in November 2019 at [facility] were reviewed. MRI of spine in April 2020 was reviewed. CT and MRI of the spine in November 2020 showed stable findings. Follow-up MRI of the spine in May 2021 was stable. He will have follow-up MRI of the spine in 1 year and also follow-up with neurosurgeon at [facility] in 1 year. 2. He has elevated PSA which could be related to prostatitis. He had PSA  on December 7, 2020. He has been seen by urologist.    3. I advised him to keep age-appropriate cancer screening up-to-date. 4. He saw dermatologist on July 23, 2019. We discussed about healthy diet and lifestyle. He had Covid vaccine. Return to clinic in 12 months or sooner. All of his question has been answered for today. Recent imaging and labs were reviewed and discussed with the patient.       Electronically signed by Maggy Hutchison MD on 12/3/20 at 8:54 AM EST

## 2021-05-26 ENCOUNTER — OFFICE VISIT (OUTPATIENT)
Dept: FAMILY MEDICINE CLINIC | Age: 71
End: 2021-05-26
Payer: MEDICARE

## 2021-05-26 VITALS
WEIGHT: 191.2 LBS | OXYGEN SATURATION: 95 % | SYSTOLIC BLOOD PRESSURE: 114 MMHG | DIASTOLIC BLOOD PRESSURE: 70 MMHG | HEART RATE: 68 BPM | HEIGHT: 66 IN | BODY MASS INDEX: 30.73 KG/M2

## 2021-05-26 DIAGNOSIS — T23.121A: Primary | ICD-10-CM

## 2021-05-26 DIAGNOSIS — Z13.1 DIABETES MELLITUS SCREENING: ICD-10-CM

## 2021-05-26 DIAGNOSIS — I10 ESSENTIAL HYPERTENSION: ICD-10-CM

## 2021-05-26 DIAGNOSIS — R73.01 IMPAIRED FASTING GLUCOSE: ICD-10-CM

## 2021-05-26 DIAGNOSIS — Z13.220 LIPID SCREENING: ICD-10-CM

## 2021-05-26 PROCEDURE — 99213 OFFICE O/P EST LOW 20 MIN: CPT | Performed by: PHYSICIAN ASSISTANT

## 2021-05-26 RX ORDER — SILVER SULFADIAZINE 1 %
CREAM (GRAM) TOPICAL
COMMUNITY
Start: 2021-05-20 | End: 2021-07-07

## 2021-05-26 ASSESSMENT — ENCOUNTER SYMPTOMS
FACIAL SWELLING: 0
SHORTNESS OF BREATH: 0
BACK PAIN: 0
COUGH: 0
DIARRHEA: 0
EYE REDNESS: 0
CHEST TIGHTNESS: 0
WHEEZING: 0
RHINORRHEA: 0
TROUBLE SWALLOWING: 0
VOMITING: 0
SINUS PRESSURE: 0
ABDOMINAL PAIN: 0
SORE THROAT: 0
EYE PAIN: 0
NAUSEA: 0
CONSTIPATION: 0
BLOOD IN STOOL: 0

## 2021-05-26 NOTE — PROGRESS NOTES
5/26/2021    Pao Harmon    Chief Complaint   Patient presents with    Other     UC f/u burn on right ring finger, healing, stiff. HPI  History was obtained from PT. Sukhi Weiss is a 69 y.o. male with a PMHx as listed below who presents today for burn to right ring finger while sweating pipes at his daughter's house last week. He touched the hot pipe and some of the solder fell under his ring. He has been treating it with silvadine cream. There is a nicole sized scab on the the ring finger. The pain is maneagable. Denies redness, swelling, fevers, decreased ROM. 1. Burn of finger, first degree, right, initial encounter    2. Essential hypertension    3. Lipid screening    4. Diabetes mellitus screening    5. Impaired fasting glucose             REVIEW OF SYMPTOMS    Review of Systems   Constitutional: Negative for fatigue, fever and unexpected weight change. HENT: Negative for congestion, dental problem, facial swelling, hearing loss, rhinorrhea, sinus pressure, sore throat and trouble swallowing. Eyes: Negative for pain, redness and visual disturbance. Respiratory: Negative for cough, chest tightness, shortness of breath and wheezing. Cardiovascular: Negative for chest pain, palpitations and leg swelling. Gastrointestinal: Negative for abdominal pain, blood in stool, constipation, diarrhea, nausea and vomiting. Endocrine: Negative for cold intolerance, heat intolerance, polydipsia and polyuria. Genitourinary: Negative for dysuria, flank pain, frequency, genital sores, hematuria and urgency. Musculoskeletal: Negative for arthralgias, back pain, gait problem, joint swelling, neck pain and neck stiffness. Skin: Positive for wound. Negative for rash. Allergic/Immunologic: Negative for environmental allergies, food allergies and immunocompromised state. Neurological: Negative for dizziness, seizures, syncope, weakness, numbness and headaches. Hematological: Negative for adenopathy. 10/28/2015    cardiolite-mild ischemia apical,EF64%    Hx of echocardiogram 2016    EF 55-60%. LA is mildly dilated. RV is mildly dilated. Mild MR. Mild tricuspid insufficiency. Significant VHD is absent.  Hyperlipidemia     Narcolepsy     Nocturnal hypoxemia 2019    Obstructive sleep apnea 2019    \"had sleep study done and they said I do not have sleep apnea, put me on oxygen at night \"    On home oxygen therapy     2l/nc at night only    Periodic limb movements of sleep 2019    Polyp of colon     Reflux     RLS (restless legs syndrome)     S/P CABG x 1 2002    Stricture of esophagus     S/P Dilation (Dr. Dilma Chun)    UTI (urinary tract infection) 2019    Wears glasses        FAMILY HISTORY  Family History   Problem Relation Age of Onset    Heart Disease Mother         Pacemaker, MVR, CABG    Stroke Mother     Cancer Father         lung ca- smoker       SOCIAL HISTORY  Social History     Socioeconomic History    Marital status:      Spouse name: Not on file    Number of children: 2    Years of education: Not on file    Highest education level: Not on file   Occupational History    Not on file   Tobacco Use    Smoking status: Former Smoker     Packs/day: 0.00     Years: 10.00     Pack years: 0.00     Types: Pipe     Quit date: 1985     Years since quittin.4    Smokeless tobacco: Never Used   Vaping Use    Vaping Use: Never used   Substance and Sexual Activity    Alcohol use:  Yes     Alcohol/week: 0.0 standard drinks     Comment: occ alcohol/ caffeine 2 cups of coffee a day    Drug use: No    Sexual activity: Not on file   Other Topics Concern    Not on file   Social History Narrative    Not on file     Social Determinants of Health     Financial Resource Strain:     Difficulty of Paying Living Expenses:    Food Insecurity:     Worried About Running Out of Food in the Last Year:     [address] in the Last Year:    Transportation Needs:     Lack of Transportation (Medical):  Lack of Transportation (Non-Medical):    Physical Activity:     Days of Exercise per Week:     Minutes of Exercise per Session:    Stress:     Feeling of Stress :    Social Connections:     Frequency of Communication with Friends and Family:     Frequency of Social Gatherings with Friends and Family:     Attends Zoroastrian Services:     Active Member of Clubs or Organizations:     Attends Club or Organization Meetings:     Marital Status:    Intimate Partner Violence:     Fear of Current or Ex-Partner:     Emotionally Abused:     Physically Abused:     Sexually Abused:        SURGICAL HISTORY  Past Surgical History:   Procedure Laterality Date    BONE BIOPSY      per old chart bx T 8 3/2019    BONE BIOPSY N/A 07/05/2019    IR T-8    COLONOSCOPY  last one 2016 2008    CORONARY ARTERY BYPASS GRAFT  03-    x 1 - [place] to LAD - Dr. Jamee Judge Right 2019    TONSILLECTOMY  as a kid    VASECTOMY  1976                 CURRENT MEDICATIONS  Current Outpatient Medications   Medication Sig Dispense Refill    SSD 1 % cream       ezetimibe (ZETIA) 10 MG tablet Take 1 tablet by mouth daily 30 tablet 6    atorvastatin (LIPITOR) 40 MG tablet Take 1 tablet by mouth daily 30 tablet 5    amLODIPine (NORVASC) 5 MG tablet Take 1 tablet by mouth daily 30 tablet 5    sertraline (ZOLOFT) 50 MG tablet TAKE ONE (1) TABLET BY MOUTH  ONCE DAILY 30 tablet 4    omeprazole (PRILOSEC) 40 MG delayed release capsule Take 1 capsule by mouth daily 30 capsule 5    pramipexole (MIRAPEX) 0.25 MG tablet Take 1 tablet by mouth nightly 30 tablet 5    aspirin 81 MG EC tablet Take 81 mg by mouth daily. No current facility-administered medications for this visit.        ALLERGIES  No Known Allergies    PHYSICAL EXAM    /70 (Site: Left Upper Arm, Position: Sitting, Cuff Size: Medium Adult)   Pulse 68   Ht 5' 6\" (1.676 m)   Wt 191 lb 3.2 oz (86.7 kg)   SpO2 95%   BMI 30.86 kg/m²     Physical Exam  Constitutional:       Appearance: Normal appearance. He is normal weight. HENT:      Head: Normocephalic and atraumatic. Right Ear: Tympanic membrane and external ear normal.      Left Ear: Tympanic membrane and external ear normal.      Nose: No rhinorrhea. Mouth/Throat:      Mouth: Mucous membranes are moist.      Pharynx: Oropharynx is clear. No oropharyngeal exudate or posterior oropharyngeal erythema. Eyes:      General: No scleral icterus. Extraocular Movements: Extraocular movements intact. Conjunctiva/sclera: Conjunctivae normal.      Pupils: Pupils are equal, round, and reactive to light. Cardiovascular:      Rate and Rhythm: Normal rate and regular rhythm. Pulses: Normal pulses. Heart sounds: Normal heart sounds. No murmur heard. No friction rub. No gallop. Pulmonary:      Effort: Pulmonary effort is normal.      Breath sounds: Normal breath sounds. No wheezing, rhonchi or rales. Abdominal:      General: Bowel sounds are normal. There is no distension. Palpations: Abdomen is soft. There is no mass. Tenderness: There is no abdominal tenderness. There is no right CVA tenderness, left CVA tenderness, guarding or rebound. Musculoskeletal:         General: No deformity. Normal range of motion. Cervical back: Normal range of motion and neck supple. No rigidity. No muscular tenderness. Right lower leg: No edema. Left lower leg: No edema. Skin:     General: Skin is warm and dry. Capillary Refill: Capillary refill takes less than 2 seconds. Findings: Lesion present. No bruising, erythema or rash. Comments: nicole sized scab on ulnar aspect of R 4th digit, proximal phalanx, does not overly the PIP. Scant surrounding erythema. Neurological:      General: No focal deficit present. Mental Status: He is alert and oriented to person, place, and time.       Coordination:

## 2021-05-27 RX ORDER — OMEPRAZOLE 40 MG/1
CAPSULE, DELAYED RELEASE ORAL
Qty: 30 CAPSULE | Refills: 5 | Status: SHIPPED | OUTPATIENT
Start: 2021-05-27 | End: 2021-11-05 | Stop reason: SDUPTHER

## 2021-05-28 DIAGNOSIS — Z13.220 LIPID SCREENING: ICD-10-CM

## 2021-05-28 DIAGNOSIS — R73.01 IMPAIRED FASTING GLUCOSE: ICD-10-CM

## 2021-05-28 DIAGNOSIS — I10 ESSENTIAL HYPERTENSION: ICD-10-CM

## 2021-05-28 LAB
A/G RATIO: 1.6 (ref 1.1–2.2)
ALBUMIN SERPL-MCNC: 4.3 G/DL (ref 3.4–5)
ALP BLD-CCNC: 73 U/L (ref 40–129)
ALT SERPL-CCNC: 31 U/L (ref 10–40)
ANION GAP SERPL CALCULATED.3IONS-SCNC: 12 MMOL/L (ref 3–16)
AST SERPL-CCNC: 21 U/L (ref 15–37)
BASOPHILS ABSOLUTE: 0 K/UL (ref 0–0.2)
BASOPHILS RELATIVE PERCENT: 0.7 %
BILIRUB SERPL-MCNC: 0.6 MG/DL (ref 0–1)
BUN BLDV-MCNC: 12 MG/DL (ref 7–20)
CALCIUM SERPL-MCNC: 9.3 MG/DL (ref 8.3–10.6)
CHLORIDE BLD-SCNC: 108 MMOL/L (ref 99–110)
CHOLESTEROL, FASTING: 101 MG/DL (ref 0–199)
CO2: 23 MMOL/L (ref 21–32)
CREAT SERPL-MCNC: 0.9 MG/DL (ref 0.8–1.3)
EOSINOPHILS ABSOLUTE: 0.3 K/UL (ref 0–0.6)
EOSINOPHILS RELATIVE PERCENT: 5.5 %
GFR AFRICAN AMERICAN: >60
GFR NON-AFRICAN AMERICAN: >60
GLOBULIN: 2.7 G/DL
GLUCOSE BLD-MCNC: 91 MG/DL (ref 70–99)
HCT VFR BLD CALC: 46 % (ref 40.5–52.5)
HDLC SERPL-MCNC: 42 MG/DL (ref 40–60)
HEMOGLOBIN: 16 G/DL (ref 13.5–17.5)
LDL CHOLESTEROL CALCULATED: 44 MG/DL
LYMPHOCYTES ABSOLUTE: 1.7 K/UL (ref 1–5.1)
LYMPHOCYTES RELATIVE PERCENT: 29.2 %
MCH RBC QN AUTO: 30 PG (ref 26–34)
MCHC RBC AUTO-ENTMCNC: 34.7 G/DL (ref 31–36)
MCV RBC AUTO: 86.4 FL (ref 80–100)
MONOCYTES ABSOLUTE: 0.7 K/UL (ref 0–1.3)
MONOCYTES RELATIVE PERCENT: 11.4 %
NEUTROPHILS ABSOLUTE: 3.2 K/UL (ref 1.7–7.7)
NEUTROPHILS RELATIVE PERCENT: 53.2 %
PDW BLD-RTO: 13.2 % (ref 12.4–15.4)
PLATELET # BLD: 183 K/UL (ref 135–450)
PMV BLD AUTO: 9.5 FL (ref 5–10.5)
POTASSIUM SERPL-SCNC: 4.5 MMOL/L (ref 3.5–5.1)
RBC # BLD: 5.32 M/UL (ref 4.2–5.9)
SODIUM BLD-SCNC: 143 MMOL/L (ref 136–145)
TOTAL PROTEIN: 7 G/DL (ref 6.4–8.2)
TRIGLYCERIDE, FASTING: 75 MG/DL (ref 0–150)
VLDLC SERPL CALC-MCNC: 15 MG/DL
WBC # BLD: 6 K/UL (ref 4–11)

## 2021-05-29 LAB
ESTIMATED AVERAGE GLUCOSE: 128.4 MG/DL
HBA1C MFR BLD: 6.1 %

## 2021-06-08 ENCOUNTER — HOSPITAL ENCOUNTER (OUTPATIENT)
Dept: INFUSION THERAPY | Age: 71
Discharge: HOME | End: 2021-06-08
Payer: MEDICARE

## 2021-06-08 ENCOUNTER — OFFICE VISIT (OUTPATIENT)
Dept: ONCOLOGY | Age: 71
End: 2021-06-08
Payer: MEDICARE

## 2021-06-08 VITALS
SYSTOLIC BLOOD PRESSURE: 141 MMHG | HEIGHT: 66 IN | HEART RATE: 59 BPM | WEIGHT: 191.2 LBS | DIASTOLIC BLOOD PRESSURE: 71 MMHG | OXYGEN SATURATION: 95 % | BODY MASS INDEX: 30.73 KG/M2 | TEMPERATURE: 97.2 F | RESPIRATION RATE: 16 BRPM

## 2021-06-08 DIAGNOSIS — R93.7 ABNORMAL FINDINGS ON DIAGNOSTIC IMAGING OF MUSCULOSKELETAL SYSTEM: Primary | ICD-10-CM

## 2021-06-08 PROCEDURE — 1123F ACP DISCUSS/DSCN MKR DOCD: CPT | Performed by: INTERNAL MEDICINE

## 2021-06-08 PROCEDURE — 1036F TOBACCO NON-USER: CPT | Performed by: INTERNAL MEDICINE

## 2021-06-08 PROCEDURE — 4040F PNEUMOC VAC/ADMIN/RCVD: CPT | Performed by: INTERNAL MEDICINE

## 2021-06-08 PROCEDURE — 3017F COLORECTAL CA SCREEN DOC REV: CPT | Performed by: INTERNAL MEDICINE

## 2021-06-08 PROCEDURE — G8417 CALC BMI ABV UP PARAM F/U: HCPCS | Performed by: INTERNAL MEDICINE

## 2021-06-08 PROCEDURE — 99213 OFFICE O/P EST LOW 20 MIN: CPT | Performed by: INTERNAL MEDICINE

## 2021-06-08 PROCEDURE — 99211 OFF/OP EST MAY X REQ PHY/QHP: CPT

## 2021-06-08 PROCEDURE — G8427 DOCREV CUR MEDS BY ELIG CLIN: HCPCS | Performed by: INTERNAL MEDICINE

## 2021-06-08 ASSESSMENT — PATIENT HEALTH QUESTIONNAIRE - PHQ9
1. LITTLE INTEREST OR PLEASURE IN DOING THINGS: 0
SUM OF ALL RESPONSES TO PHQ QUESTIONS 1-9: 0
2. FEELING DOWN, DEPRESSED OR HOPELESS: 0
SUM OF ALL RESPONSES TO PHQ QUESTIONS 1-9: 0
SUM OF ALL RESPONSES TO PHQ QUESTIONS 1-9: 0
SUM OF ALL RESPONSES TO PHQ9 QUESTIONS 1 & 2: 0

## 2021-06-08 NOTE — PROGRESS NOTES
MA Rooming Questions  Patient: Vicky Valero  MRN: [id number]    Date: 6/8/2021        1. Do you have any new issues?   no         2. Do you need any refills on medications?    no    3. Have you had any imaging done since your last visit?   no    4. Have you been hospitalized or seen in the emergency room since your last visit here?   no    5. Did the patient have a depression screening completed today?  Yes    PHQ-9 Total Score: 0 (6/8/2021  9:10 AM)       PHQ-9 Given to (if applicable):               PHQ-9 Score (if applicable):                     [] Positive     []  Negative              Does question #9 need addressed (if applicable)                     [] Yes    []  No               Babak Starkey CMA

## 2021-06-09 RX ORDER — AMLODIPINE BESYLATE 5 MG/1
5 TABLET ORAL DAILY
Qty: 30 TABLET | Refills: 5 | Status: SHIPPED | OUTPATIENT
Start: 2021-06-09 | End: 2021-12-16

## 2021-07-07 ENCOUNTER — TELEMEDICINE (OUTPATIENT)
Dept: FAMILY MEDICINE CLINIC | Age: 71
End: 2021-07-07
Payer: MEDICARE

## 2021-07-07 DIAGNOSIS — F41.9 ANXIETY: ICD-10-CM

## 2021-07-07 DIAGNOSIS — J42 CHRONIC BRONCHITIS, UNSPECIFIED CHRONIC BRONCHITIS TYPE (HCC): ICD-10-CM

## 2021-07-07 DIAGNOSIS — K21.9 GASTROESOPHAGEAL REFLUX DISEASE WITHOUT ESOPHAGITIS: ICD-10-CM

## 2021-07-07 DIAGNOSIS — R73.9 HYPERGLYCEMIA: ICD-10-CM

## 2021-07-07 DIAGNOSIS — I25.10 CORONARY ARTERIOSCLEROSIS: ICD-10-CM

## 2021-07-07 DIAGNOSIS — G25.81 RESTLESS LEG SYNDROME: Primary | ICD-10-CM

## 2021-07-07 DIAGNOSIS — I25.10 CORONARY ARTERY DISEASE INVOLVING NATIVE CORONARY ARTERY OF NATIVE HEART WITHOUT ANGINA PECTORIS: ICD-10-CM

## 2021-07-07 DIAGNOSIS — I10 ESSENTIAL HYPERTENSION: ICD-10-CM

## 2021-07-07 PROCEDURE — 1036F TOBACCO NON-USER: CPT | Performed by: FAMILY MEDICINE

## 2021-07-07 PROCEDURE — G8417 CALC BMI ABV UP PARAM F/U: HCPCS | Performed by: FAMILY MEDICINE

## 2021-07-07 PROCEDURE — 3017F COLORECTAL CA SCREEN DOC REV: CPT | Performed by: FAMILY MEDICINE

## 2021-07-07 PROCEDURE — 4040F PNEUMOC VAC/ADMIN/RCVD: CPT | Performed by: FAMILY MEDICINE

## 2021-07-07 PROCEDURE — 99213 OFFICE O/P EST LOW 20 MIN: CPT | Performed by: FAMILY MEDICINE

## 2021-07-07 PROCEDURE — G8926 SPIRO NO PERF OR DOC: HCPCS | Performed by: FAMILY MEDICINE

## 2021-07-07 PROCEDURE — G8427 DOCREV CUR MEDS BY ELIG CLIN: HCPCS | Performed by: FAMILY MEDICINE

## 2021-07-07 PROCEDURE — 1123F ACP DISCUSS/DSCN MKR DOCD: CPT | Performed by: FAMILY MEDICINE

## 2021-07-07 PROCEDURE — 3023F SPIROM DOC REV: CPT | Performed by: FAMILY MEDICINE

## 2021-07-07 RX ORDER — ATORVASTATIN CALCIUM 40 MG/1
40 TABLET, FILM COATED ORAL DAILY
Qty: 30 TABLET | Refills: 5 | Status: SHIPPED | OUTPATIENT
Start: 2021-07-07 | End: 2021-11-05 | Stop reason: SDUPTHER

## 2021-07-07 ASSESSMENT — ENCOUNTER SYMPTOMS
BLOOD IN STOOL: 0
DIARRHEA: 0
VOMITING: 0
TROUBLE SWALLOWING: 0
WHEEZING: 0
ABDOMINAL PAIN: 0
NAUSEA: 0
SHORTNESS OF BREATH: 0
EYE PAIN: 0
CHEST TIGHTNESS: 0

## 2021-07-07 NOTE — PROGRESS NOTES
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 Also has been followed for hyperglycemia through this office last A1c was 6.1%(). Patient doing well with GERD. Blood pressures meds well-tolerated and doing well. Anxiety is not flared at this time- he is now working every other week for a [employer]. Denies chest pain or shortness of breath follow-up with cardiology has been reasonable- no increase shortness of breath recently noted. Review of Systems   Constitutional: Negative for activity change and fatigue. HENT: Negative for congestion, hearing loss, mouth sores and trouble swallowing. Eyes: Negative for pain and visual disturbance. Respiratory: Negative for chest tightness, shortness of breath and wheezing. Cardiovascular: Negative for chest pain and palpitations. Gastrointestinal: Negative for abdominal pain, blood in stool, diarrhea, nausea and vomiting. Endocrine: Negative for cold intolerance, polydipsia and polyuria. Genitourinary: Negative for dysuria, frequency and urgency. Musculoskeletal: Negative for arthralgias, gait problem and neck stiffness. Skin: Negative for rash. Allergic/Immunologic: Negative for environmental allergies. Neurological: Negative for dizziness, seizures, speech difficulty and weakness. Patient with restless leg syndrome stable   Hematological: Does not bruise/bleed easily. Psychiatric/Behavioral: Negative for agitation, confusion, hallucinations and suicidal ideas. The patient is nervous/anxious.         Prior to Visit Medications    Medication Sig Taking? Authorizing Provider   sertraline (ZOLOFT) 50 MG tablet TAKE ONE (1) TABLET BY MOUTH  ONCE DAILY Yes Hakan Lord MD   atorvastatin (LIPITOR) 40 MG tablet Take 1 tablet by mouth daily Yes Hakan Lord MD   amLODIPine (NORVASC) 5 MG tablet Take 1 tablet by mouth daily Yes Claribel Tamayo MD   omeprazole (PRILOSEC) 40 MG delayed release capsule TAKE ONE (1) CAPSULE BY MOUTH ONCE DAILY Yes Hakan Lord MD   ezetimibe (ZETIA) 10 MG tablet Take 1 tablet by mouth daily Yes Claribel Tamayo MD   pramipexole (MIRAPEX) 0.25 MG tablet Take 1 tablet by mouth nightly Yes Jena Peterson PA-C   aspirin 81 MG EC tablet Take 81 mg by mouth daily. Yes Historical Provider, MD   SSD 1 % cream   Historical Provider, MD       Social History     Tobacco Use    Smoking status: Former Smoker     Packs/day: 0.00     Years: 10.00     Pack years: 0.00     Types: Pipe     Quit date: 1985     Years since quittin.5    Smokeless tobacco: Never Used   Vaping Use    Vaping Use: Never used   Substance Use Topics    Alcohol use: Yes     Alcohol/week: 0.0 standard drinks     Comment: occ alcohol/ caffeine 2 cups of coffee a day    Drug use: No            PHYSICAL EXAMINATION:  [ INSTRUCTIONS:  \"[x]\" Indicates a positive item  \"[]\" Indicates a negative item  -- DELETE ALL ITEMS NOT EXAMINED]  Vital Signs: (As obtained by patient/caregiver or practitioner observation)    Blood pressure-  Heart rate-    Respiratory rate-    Temperature-  Pulse oximetry-     Constitutional: [x] Appears well-developed and well-nourished [x] No apparent distress      [] Abnormal-   Mental status  [x] Alert and awake  [x] Oriented to person/place/time [x]Able to follow commands      Eyes:  EOM    [x]  Normal  [] Abnormal-  Sclera  [x]  Normal  [] Abnormal -         Discharge []  None visible  [] Abnormal -    HENT:   [x] Normocephalic, atraumatic.   [] Abnormal   [x] Mouth/Throat: Mucous appropriate. The patient was located in a state where the provider was credentialed to provide care. Total time spent on this encounter: Not billed by time    --Betina Barbour MD on 7/7/2021 at 5:46 PM    An electronic signature was used to authenticate this note.

## 2021-07-22 NOTE — PROGRESS NOTES
COLONOSCOPY 7/29/21 1000 ARRIVE 0830             1. Do not eat or drink anything after midnight - unless instructed by your doctor prior to surgery. This includes no water, chewing gum or mints. 2. Follow your directions as prescribed by the doctor for your procedure and medications. TAKE PRILOSEC MORNING OF PROCEDURE WITH SIP OF WATER   3. Check with your Doctor regarding stopping Plavix, Coumadin, Lovenox,Effient,Pradaxa,Xarelto, Fragmin or other blood thinners and follow their instructions. 4. Do not smoke, and do not drink any alcoholic beverages 24 hours prior to surgery. This includes NA Beer. 5. You may brush your teeth and gargle the morning of surgery. DO NOT SWALLOW WATER   6. You MUST make arrangements for a responsible adult to take you home after your surgery and be able to check on you every couple hours for the day. You will not be allowed to leave alone or drive yourself home. It is strongly suggested someone stay with you the first 24 hrs. Your surgery will be cancelled if you do not have a ride home. 7. Please wear simple, loose fitting clothing to the hospital.  [place] not bring valuables (money, credit cards, checkbooks, etc.) Do not wear any makeup (including no eye makeup) or nail polish on your fingers or toes. 8. DO NOT wear any jewelry or piercings on day of surgery. All body piercing jewelry must be removed. 9. If you have dentures, they will be removed before going to the OR; we will provide you a container. If you wear contact lenses or glasses, they will be removed; please bring a case for them. 10. If you  have a Living Will and Durable Power of  for Healthcare, please bring in a copy. 11. Please bring picture ID, insurance card, paperwork from the doctors office  (H & P, Consent, & card for implantable devices).            12. Take a shower the night before or morning of your procedure, do not apply any lotion, oil or powder. 13. Wear a mask covering your nose & mouth when entering the hospital. PATIENT  W. Patricia Thomas. REQUESTED PATIENT TO BRING COVID VACCINE CARD WITH HIM DOS.

## 2021-07-28 ENCOUNTER — ANESTHESIA EVENT (OUTPATIENT)
Dept: OPERATING ROOM | Age: 71
End: 2021-07-28
Payer: MEDICARE

## 2021-07-28 NOTE — ANESTHESIA PRE PROCEDURE
Department of Anesthesiology  Preprocedure Note       Name:  Efrem Hurtado   Age:  69 y.o.  :  1950                                          MRN:  [id number]         Date:  2021      Surgeon: Saranya Simon):  Cedrick Jensen MD    Procedure: Procedure(s):  COLONOSCOPY DIAGNOSTIC    Medications prior to admission:   Prior to Admission medications    Medication Sig Start Date End Date Taking? Authorizing Provider   sertraline (ZOLOFT) 50 MG tablet TAKE ONE (1) TABLET BY MOUTH  ONCE DAILY 21   Amish Grant MD   atorvastatin (LIPITOR) 40 MG tablet Take 1 tablet by mouth daily 21   Amish Grant MD   amLODIPine [facility]) 5 MG tablet Take 1 tablet by mouth daily 21   Filiberto Foster MD   omeprazole (PRILOSEC) 40 MG delayed release capsule TAKE ONE (1) CAPSULE BY MOUTH ONCE DAILY 21   Amish Grant MD   ezetimibe (ZETIA) 10 MG tablet Take 1 tablet by mouth daily 21   Filiberto Foster MD   pramipexole (MIRAPEX) 0.25 MG tablet Take 1 tablet by mouth nightly 1/15/20   Miguel A Wong PA-C   aspirin 81 MG EC tablet Take 81 mg by mouth daily. Historical Provider, MD       Current medications:    No current facility-administered medications for this encounter. Current Outpatient Medications   Medication Sig Dispense Refill    sertraline (ZOLOFT) 50 MG tablet TAKE ONE (1) TABLET BY MOUTH  ONCE DAILY 30 tablet 5    atorvastatin (LIPITOR) 40 MG tablet Take 1 tablet by mouth daily 30 tablet 5    amLODIPine (NORVASC) 5 MG tablet Take 1 tablet by mouth daily 30 tablet 5    omeprazole (PRILOSEC) 40 MG delayed release capsule TAKE ONE (1) CAPSULE BY MOUTH ONCE DAILY 30 capsule 5    ezetimibe (ZETIA) 10 MG tablet Take 1 tablet by mouth daily 30 tablet 6    pramipexole (MIRAPEX) 0.25 MG tablet Take 1 tablet by mouth nightly 30 tablet 5    aspirin 81 MG EC tablet Take 81 mg by mouth daily.            Allergies:  No Known Allergies    Problem List:    Patient Active Problem List   Diagnosis Code    CAD (coronary artery disease) I25.10    Hyperlipidemia E78.5    GERD (gastroesophageal reflux disease) K21.9    S/P CABG x 1 Z95.1    HTN (hypertension) I10    Family history of coronary artery disease Z80.55    Iron deficiency anemia due to chronic blood loss D50.0    Acute respiratory failure with hypoxia ([facility]) J96.01    Community acquired pneumonia J18.9    Dyspnea R06.00    Excessive daytime sleepiness G47.19    COPD (chronic obstructive pulmonary disease) ([facility]) J44.9    Periodic limb movements of sleep G47.61    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    Anxiety F41.9    Narcolepsy G47.419    Chronic cough R05    Abnormal findings on diagnostic imaging of musculoskeletal system R93.7    Restless leg syndrome G25.81    Former smoker Z87.891    Hyperglycemia R73.9       Past Medical History:        Diagnosis Date    Abnormal CT scan     per pt \"did CT scan and found spot on spine( T 8 lesion) back in March( 2019) did bx but did not show anything and now ( 7/5/2019) going to try do another bx\"    BPH (benign prostatic hyperplasia)     CAD (coronary artery disease)     follows with Dr Roe Knox    Chronic cough 2/18/2020    COPD (chronic obstructive pulmonary disease) ([facility] [address] 4/5/2019    follow with Dr Sheppard [facility] Coronary arteriosclerosis     Excessive daytime sleepiness 4/5/2019    Family history of coronary artery disease     Former smoker 8/17/2020    GERD (gastroesophageal reflux disease)     H/O cardiac catheterization 03-    ([facility]) Total occ. LAD w/mild disease of LM and RCA.     H/O cardiovascular stress test 02-    (Exercise) EF 58%. Normal. Exercise cap. 11.0 METS.    H/O cardiovascular stress test 05-    (Cardiolite) EF 63%. Good exercise capacity. Hypertensive blood pressure response tp exercise. ETT neg for ischemia or arrhythmia.  Cardiolite perfusion imaging reveals Ant. wall soft tissue attenuation artifact and mild ischemia of Inf. wall.  H/O colonoscopy with polypectomy 2008    Polyp in Rectum    H/O Doppler ultrasound 2002    (Carotid) No anatomical abnormalities. Normal    H/O echocardiogram 2010 EF 50-55%  abn LV diast stage 1 2/10EF 50-55%. Abn. LV Diastolic function Stage 1. Left Atrium borderline Dilated.  H/O gastritis     Years Ago    History of cardiac monitoring 2017    30 day monitoring: normal, Sinus rhythm noted. no symptoms and no a fib or arrhythmia noted    History of complete ECG 2002    [place] (hard of hearing)     noé hearing aides    HTN (hypertension)     Hx of cardiovascular stress test 10/28/2015    cardiolite-mild ischemia apical,EF64%    Hx of echocardiogram 2016    EF 55-60%. LA is mildly dilated. RV is mildly dilated. Mild MR. Mild tricuspid insufficiency. Significant VHD is absent.      Hyperlipidemia     Narcolepsy     Nocturnal hypoxemia 2019    Obstructive sleep apnea 2019    \"had sleep study done and they said I do not have sleep apnea, put me on oxygen at night \"    On home oxygen therapy     2l/nc at night only    Periodic limb movements of sleep 2019    Polyp of colon     Reflux     RLS (restless legs syndrome)     S/P CABG x 1 2002    Stricture of esophagus     S/P Dilation (Dr. Melyssa Almodovar)    UTI (urinary tract infection) 2019    Wears glasses        Past Surgical History:        Procedure Laterality Date    BONE BIOPSY      per old chart bx T 8 3/2019    BONE BIOPSY N/A 2019    IR T-8    COLONOSCOPY  last one 2016    CORONARY ARTERY BYPASS GRAFT  03-    x 1 - [place] to LAD - Dr. Jose Quintero Right 2019    TONSILLECTOMY  as a kid   [facility] VASECTOMY         Social History:    Social History     Tobacco Use    Smoking status: Former Smoker     Packs/day: 0.00     Years: 10.00     Pack years: 0.00     Types: Pipe     Quit date: 1985     Years since quittin.5   

## 2021-07-29 ENCOUNTER — HOSPITAL ENCOUNTER (OUTPATIENT)
Age: 71
Setting detail: OUTPATIENT SURGERY
Discharge: HOME | End: 2021-07-29
Attending: SPECIALIST | Admitting: SPECIALIST
Payer: MEDICARE

## 2021-07-29 ENCOUNTER — ANESTHESIA (OUTPATIENT)
Dept: OPERATING ROOM | Age: 71
End: 2021-07-29
Payer: MEDICARE

## 2021-07-29 VITALS
RESPIRATION RATE: 16 BRPM | OXYGEN SATURATION: 94 % | HEIGHT: 66 IN | SYSTOLIC BLOOD PRESSURE: 116 MMHG | DIASTOLIC BLOOD PRESSURE: 75 MMHG | BODY MASS INDEX: 30.05 KG/M2 | HEART RATE: 53 BPM | TEMPERATURE: 97.6 F | WEIGHT: 187 LBS

## 2021-07-29 VITALS
OXYGEN SATURATION: 96 % | SYSTOLIC BLOOD PRESSURE: 105 MMHG | DIASTOLIC BLOOD PRESSURE: 70 MMHG | RESPIRATION RATE: 14 BRPM

## 2021-07-29 PROCEDURE — 88305 TISSUE EXAM BY PATHOLOGIST: CPT

## 2021-07-29 PROCEDURE — 3700000000 HC ANESTHESIA ATTENDED CARE: Performed by: SPECIALIST

## 2021-07-29 PROCEDURE — 3609010600 HC COLONOSCOPY POLYPECTOMY SNARE/COLD BIOPSY: Performed by: SPECIALIST

## 2021-07-29 PROCEDURE — 7100000010 HC PHASE II RECOVERY - FIRST 15 MIN: Performed by: SPECIALIST

## 2021-07-29 PROCEDURE — 7100000011 HC PHASE II RECOVERY - ADDTL 15 MIN: Performed by: SPECIALIST

## 2021-07-29 PROCEDURE — 2580000003 HC RX 258: Performed by: SPECIALIST

## 2021-07-29 PROCEDURE — 2709999900 HC NON-CHARGEABLE SUPPLY: Performed by: SPECIALIST

## 2021-07-29 PROCEDURE — 3700000001 HC ADD 15 MINUTES (ANESTHESIA): Performed by: SPECIALIST

## 2021-07-29 PROCEDURE — 6360000002 HC RX W HCPCS: Performed by: NURSE ANESTHETIST, CERTIFIED REGISTERED

## 2021-07-29 PROCEDURE — 45385 COLONOSCOPY W/LESION REMOVAL: CPT | Performed by: SPECIALIST

## 2021-07-29 RX ORDER — LIDOCAINE HYDROCHLORIDE 20 MG/ML
INJECTION, SOLUTION INTRAVENOUS PRN
Status: DISCONTINUED | OUTPATIENT
Start: 2021-07-29 | End: 2021-07-29 | Stop reason: SDUPTHER

## 2021-07-29 RX ORDER — PROPOFOL 10 MG/ML
INJECTION, EMULSION INTRAVENOUS PRN
Status: DISCONTINUED | OUTPATIENT
Start: 2021-07-29 | End: 2021-07-29 | Stop reason: SDUPTHER

## 2021-07-29 RX ORDER — SODIUM CHLORIDE, SODIUM LACTATE, POTASSIUM CHLORIDE, CALCIUM CHLORIDE 600; 310; 30; 20 MG/100ML; MG/100ML; MG/100ML; MG/100ML
INJECTION, SOLUTION INTRAVENOUS CONTINUOUS
Status: DISCONTINUED | OUTPATIENT
Start: 2021-07-29 | End: 2021-07-29 | Stop reason: HOSPADM

## 2021-07-29 RX ADMIN — PROPOFOL 50 MG: 10 INJECTION, EMULSION INTRAVENOUS at 10:19

## 2021-07-29 RX ADMIN — PROPOFOL 50 MG: 10 INJECTION, EMULSION INTRAVENOUS at 10:00

## 2021-07-29 RX ADMIN — LIDOCAINE HYDROCHLORIDE 100 MG: 20 INJECTION, SOLUTION INTRAVENOUS at 10:00

## 2021-07-29 RX ADMIN — PROPOFOL 50 MG: 10 INJECTION, EMULSION INTRAVENOUS at 10:05

## 2021-07-29 RX ADMIN — PROPOFOL 50 MG: 10 INJECTION, EMULSION INTRAVENOUS at 10:12

## 2021-07-29 RX ADMIN — PROPOFOL 50 MG: 10 INJECTION, EMULSION INTRAVENOUS at 10:10

## 2021-07-29 RX ADMIN — PROPOFOL 50 MG: 10 INJECTION, EMULSION INTRAVENOUS at 10:16

## 2021-07-29 RX ADMIN — PROPOFOL 50 MG: 10 INJECTION, EMULSION INTRAVENOUS at 10:01

## 2021-07-29 RX ADMIN — PROPOFOL 50 MG: 10 INJECTION, EMULSION INTRAVENOUS at 10:08

## 2021-07-29 RX ADMIN — PROPOFOL 50 MG: 10 INJECTION, EMULSION INTRAVENOUS at 10:22

## 2021-07-29 RX ADMIN — SODIUM CHLORIDE, POTASSIUM CHLORIDE, SODIUM LACTATE AND CALCIUM CHLORIDE: 600; 310; 30; 20 INJECTION, SOLUTION INTRAVENOUS at 09:13

## 2021-07-29 RX ADMIN — PROPOFOL 50 MG: 10 INJECTION, EMULSION INTRAVENOUS at 10:25

## 2021-07-29 ASSESSMENT — PAIN SCALES - GENERAL: PAINLEVEL_OUTOF10: 0

## 2021-07-29 ASSESSMENT — PAIN - FUNCTIONAL ASSESSMENT: PAIN_FUNCTIONAL_ASSESSMENT: 0-10

## 2021-07-29 NOTE — ANESTHESIA PRE PROCEDURE
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    Anxiety F41.9    Narcolepsy G47.419    Chronic cough R05    Abnormal findings on diagnostic imaging of musculoskeletal system R93.7    Restless leg syndrome G25.81    Former smoker Z87.891    Hyperglycemia R73.9       Past Medical History:        Diagnosis Date    Abnormal CT scan     per pt \"did CT scan and found spot on spine( T 8 lesion) back in March( 2019) did bx but did not show anything and now ( 7/5/2019) going to try do another bx\"    BPH (benign prostatic hyperplasia)     CAD (coronary artery disease)     follows with Dr Alicia Phillip    Chronic cough 2/18/2020    COPD (chronic obstructive pulmonary disease) ([facility] [address] 4/5/2019    follow with Dr Елена Owen Coronary arteriosclerosis     Excessive daytime sleepiness 4/5/2019    Family history of coronary artery disease     Former smoker 8/17/2020    GERD (gastroesophageal reflux disease)     H/O cardiac catheterization 03-    ([facility]) Total occ. LAD w/mild disease of LM and RCA.     H/O cardiovascular stress test 02-    (Exercise) EF 58%. Normal. Exercise cap. 11.0 METS.    H/O cardiovascular stress test 05-    (Cardiolite) EF 63%. Good exercise capacity. Hypertensive blood pressure response tp exercise. ETT neg for ischemia or arrhythmia. Cardiolite perfusion imaging reveals Ant. wall soft tissue attenuation artifact and mild ischemia of Inf. wall.  H/O colonoscopy with polypectomy 02-    Polyp in Rectum    H/O Doppler ultrasound 03-    (Carotid) No anatomical abnormalities. Normal    H/O echocardiogram 02- 6/14 EF 50-55%  abn LV diast stage 1 2/10EF 50-55%. Abn. LV Diastolic function Stage 1. Left Atrium borderline Dilated.      H/O gastritis     Years Ago    History of cardiac monitoring 2017    30 day monitoring: normal, Sinus rhythm noted. no symptoms and no a fib or arrhythmia noted    History of complete ECG 2002    [place] (hard of hearing)     noé hearing aides    HTN (hypertension)     Hx of cardiovascular stress test 10/28/2015    cardiolite-mild ischemia apical,EF64%    Hx of echocardiogram 2016    EF 55-60%. LA is mildly dilated. RV is mildly dilated. Mild MR. Mild tricuspid insufficiency. Significant VHD is absent.  Hyperlipidemia     Narcolepsy     Nocturnal hypoxemia 2019    Obstructive sleep apnea 2019    \"had sleep study done and they said I do not have sleep apnea, put me on oxygen at night \"    On home oxygen therapy     2l/nc at night only    Periodic limb movements of sleep 2019    Polyp of colon     Reflux     RLS (restless legs syndrome)     S/P CABG x 1 2002    Stricture of esophagus     S/P Dilation (Dr. Drew Epley)    UTI (urinary tract infection) 2019    Wears glasses        Past Surgical History:        Procedure Laterality Date    BONE BIOPSY      per old chart bx T 8 3/2019    BONE BIOPSY N/A 2019    IR T-8    COLONOSCOPY  last one 2016    CORONARY ARTERY BYPASS GRAFT  03-    x 1 - [place] to LAD - Dr. Ruby Roman Right 2019    TONSILLECTOMY  as a kid   [place] VASECTOMY         Social History:    Social History     Tobacco Use    Smoking status: Former Smoker     Packs/day: 0.00     Years: 10.00     Pack years: 0.00     Types: Pipe     Quit date: 1985     Years since quittin.5    Smokeless tobacco: Never Used   Substance Use Topics    Alcohol use: Yes     Alcohol/week: 0.0 standard drinks     Comment: occ alcohol/ caffeine 2 cups of coffee a day                                Counseling given: Not Answered      Vital Signs (Current): There were no vitals filed for this visit.                                            BP Readings from Last 3 Encounters:   07/29/21 (!) 140/71   06/08/21 (!) 141/71   05/26/21 114/70       NPO Status:                                                                                 BMI:   Wt Readings from Last 3 Encounters:   07/22/21 187 lb (84.8 kg)   06/08/21 191 lb 3.2 oz (86.7 kg)   05/26/21 191 lb 3.2 oz (86.7 kg)     There is no height or weight on file to calculate BMI.    CBC:   Lab Results   Component Value Date    WBC 6.0 05/28/2021    RBC 5.32 05/28/2021    HGB 16.0 05/28/2021    HCT 46.0 05/28/2021    MCV 86.4 05/28/2021    RDW 13.2 05/28/2021     05/28/2021       CMP:   Lab Results   Component Value Date     05/28/2021    K 4.5 05/28/2021     05/28/2021    CO2 23 05/28/2021    BUN 12 05/28/2021    CREATININE 0.9 05/28/2021    GFRAA >60 05/28/2021    AGRATIO 1.6 05/28/2021    LABGLOM >60 05/28/2021    GLUCOSE 91 05/28/2021    PROT 7.0 05/28/2021    CALCIUM 9.3 05/28/2021    BILITOT 0.6 05/28/2021    ALKPHOS 73 05/28/2021    AST 21 05/28/2021    ALT 31 05/28/2021       POC Tests: No results for input(s): POCGLU, POCNA, POCK, POCCL, POCBUN, POCHEMO, POCHCT in the last 72 hours.     Coags:   Lab Results   Component Value Date    PROTIME 11.4 07/05/2019    INR 1.00 07/05/2019    APTT 25.2 07/05/2019       HCG (If Applicable): No results found for: PREGTESTUR, PREGSERUM, HCG, HCGQUANT     ABGs: No results found for: PHART, PO2ART, LSG8MEK, SPT9IGE, BEART, J8TDAFVE     Type & Screen (If Applicable):  No results found for: LABABO, LABRH    Drug/Infectious Status (If Applicable):  Lab Results   Component Value Date    HEPCAB NON REACTIVE 03/19/2019       COVID-19 Screening (If Applicable): No results found for: COVID19        Anesthesia Evaluation  Patient summary reviewed  Airway: Mallampati: II  TM distance: >3 FB   Neck ROM: full  Mouth opening: > = 3 FB Dental:          Pulmonary:normal exam    (+) COPD:  sleep apnea:                             Cardiovascular:  Exercise tolerance: good (>4 METS),   (+) hypertension:, CAD:, CABG/stent:,         Rhythm: regular  Rate: normal                 ROS comment:  Summary   Left ventricular systolic function is normal.   Ejection fraction is visually estimated at 50-55%. Mild left ventricular hypertrophy. Grade I diastolic dysfunction. Mildly dilated left atrium. No evidence of any pericardial effusion. Neuro/Psych:   Negative Neuro/Psych ROS              GI/Hepatic/Renal:   (+) GERD:, bowel prep,           Endo/Other: Negative Endo/Other ROS                    Abdominal:             Vascular: negative vascular ROS. Other Findings:               Anesthesia Plan      MAC     ASA 2     ( Pre Anesthesia Assessment complete. Chart reviewed on 7/28/2021)  Induction: intravenous. Anesthetic plan and risks discussed with patient. Plan discussed with CRNA.                   PORTIA Locke - CRNA   7/29/2021

## 2021-07-29 NOTE — ANESTHESIA POSTPROCEDURE EVALUATION
Department of Anesthesiology  Postprocedure Note    Patient: Lesley Lopez  MRN: [id number]  YOB: 1950  Date of evaluation: 7/29/2021  Time:  10:42 AM     Procedure Summary     Date: 07/29/21 Room / Location: [address]    Anesthesia Start: 7738 Anesthesia Stop: 5792    Procedure: COLONOSCOPY POLYPECTOMY SNARE/COLD BIOPSY (N/A ) Diagnosis: (HX OF POLYPS)    Surgeons: Tanya Anne MD Responsible Provider: PORTIA Lee CRNA    Anesthesia Type: MAC ASA Status: 2          Anesthesia Type: MAC    Fide Phase I:      Fide Phase II:      Last vitals: Reviewed and per EMR flowsheets.        Anesthesia Post Evaluation    Patient location during evaluation: bedside  Patient participation: complete - patient participated  Level of consciousness: awake and alert  Pain score: 0  Airway patency: patent  Nausea & Vomiting: no vomiting and no nausea  Complications: no  Cardiovascular status: blood pressure returned to baseline and hemodynamically stable  Respiratory status: acceptable, room air, spontaneous ventilation and nonlabored ventilation  Hydration status: stable

## 2021-07-29 NOTE — BRIEF OP NOTE
BRIEF COLONOSCOPY REPORT:   Photos and full colonoscopy report available by going to \"chart review\" then \"procedures\" then  \"colonoscopy\" then \"View Report\"      IMPRESSION :   1) 6 mm polyp removed from the hepatic flexure with the cold snare  2) diverticulosis of the left colon  3) small internal hemorrhoids  4) otherwise normal colon    PLAN :   1) If the resected polyp is a sessile serrated polyp, then follow up colonoscopy in 5 years   2) If the polyp is an adenoma, then repeat in 7-10 years. 3) If there is any \"villous\" component or high grade  dysplasia in the polyp, then repeat in 3 years.    4) If the polyp is only hyperplastic, then follow up  colonoscopy is not needed for 10 years

## 2021-07-29 NOTE — H&P
Original H &P in soft chart. I have examined the patient immediately before the procedure and there is no change in the previous history and physical exam, which has been reviewed. There is a history of sleep apnea. There has been no  previous adverse experience with sedation/anesthesia. There is no increased risk for aspiration of gastric contents. The patient has been instructed that all resuscitative measures (during the operative and immediate perioperative period) will be instituted in the unlikely event that they will be needed. The patient has no pertinent past surgical or family history other than listed in the original H&P. The patient was counseled about the risks of autumn Covid-19 during their perioperative period and any recovery window from their procedure. The patient was made aware that autumn Covid-19  may worsen their prognosis for recovering from their procedure  and lend to a higher morbidity and/or mortality risk. All material risks, benefits, and reasonable alternatives including postponing the procedure were discussed. The patient does wish to proceed with the procedure at this time.     ASA Class: 3  AIRWAY Class: 1

## 2021-07-29 NOTE — FLOWSHEET NOTE
Returned to room Q2. Report received from [address] . Alert and oriented. Respirations even and unlabored. Color pink. Abdomen soft and nontender. Beverage provided. Call light in reach. Wife supportive at bedside.

## 2021-08-03 ENCOUNTER — VIRTUAL VISIT (OUTPATIENT)
Dept: FAMILY MEDICINE CLINIC | Age: 71
End: 2021-08-03
Payer: MEDICARE

## 2021-08-03 DIAGNOSIS — Z00.00 ROUTINE GENERAL MEDICAL EXAMINATION AT A HEALTH CARE FACILITY: Primary | ICD-10-CM

## 2021-08-03 PROCEDURE — 4040F PNEUMOC VAC/ADMIN/RCVD: CPT | Performed by: FAMILY MEDICINE

## 2021-08-03 PROCEDURE — G0439 PPPS, SUBSEQ VISIT: HCPCS | Performed by: FAMILY MEDICINE

## 2021-08-03 PROCEDURE — 3017F COLORECTAL CA SCREEN DOC REV: CPT | Performed by: FAMILY MEDICINE

## 2021-08-03 PROCEDURE — 1123F ACP DISCUSS/DSCN MKR DOCD: CPT | Performed by: FAMILY MEDICINE

## 2021-08-03 SDOH — ECONOMIC STABILITY: FOOD INSECURITY: WITHIN THE PAST 12 MONTHS, THE FOOD YOU BOUGHT JUST DIDN'T LAST AND YOU DIDN'T HAVE MONEY TO GET MORE.: NEVER TRUE

## 2021-08-03 SDOH — ECONOMIC STABILITY: FOOD INSECURITY: WITHIN THE PAST 12 MONTHS, YOU WORRIED THAT YOUR FOOD WOULD RUN OUT BEFORE YOU GOT MONEY TO BUY MORE.: NEVER TRUE

## 2021-08-03 ASSESSMENT — PATIENT HEALTH QUESTIONNAIRE - PHQ9
1. LITTLE INTEREST OR PLEASURE IN DOING THINGS: 0
SUM OF ALL RESPONSES TO PHQ QUESTIONS 1-9: 0
SUM OF ALL RESPONSES TO PHQ9 QUESTIONS 1 & 2: 0
2. FEELING DOWN, DEPRESSED OR HOPELESS: 0

## 2021-08-03 ASSESSMENT — LIFESTYLE VARIABLES
AUDIT-C TOTAL SCORE: 4
HOW MANY STANDARD DRINKS CONTAINING ALCOHOL DO YOU HAVE ON A TYPICAL DAY: 0
HOW OFTEN DURING THE LAST YEAR HAVE YOU HAD A FEELING OF GUILT OR REMORSE AFTER DRINKING: 0
AUDIT TOTAL SCORE: 4
HAS A RELATIVE, FRIEND, DOCTOR, OR ANOTHER HEALTH PROFESSIONAL EXPRESSED CONCERN ABOUT YOUR DRINKING OR SUGGESTED YOU CUT DOWN: 0
HOW OFTEN DURING THE LAST YEAR HAVE YOU NEEDED AN ALCOHOLIC DRINK FIRST THING IN THE MORNING TO GET YOURSELF GOING AFTER A NIGHT OF HEAVY DRINKING: 0
HOW OFTEN DO YOU HAVE A DRINK CONTAINING ALCOHOL: 4
HOW OFTEN DURING THE LAST YEAR HAVE YOU BEEN UNABLE TO REMEMBER WHAT HAPPENED THE NIGHT BEFORE BECAUSE YOU HAD BEEN DRINKING: 0
HAVE YOU OR SOMEONE ELSE BEEN INJURED AS A RESULT OF YOUR DRINKING: 0
HOW OFTEN DO YOU HAVE SIX OR MORE DRINKS ON ONE OCCASION: 0
HOW OFTEN DURING THE LAST YEAR HAVE YOU FOUND THAT YOU WERE NOT ABLE TO STOP DRINKING ONCE YOU HAD STARTED: 0
HOW OFTEN DURING THE LAST YEAR HAVE YOU FAILED TO DO WHAT WAS NORMALLY EXPECTED FROM YOU BECAUSE OF DRINKING: 0

## 2021-08-03 ASSESSMENT — SOCIAL DETERMINANTS OF HEALTH (SDOH): HOW HARD IS IT FOR YOU TO PAY FOR THE VERY BASICS LIKE FOOD, HOUSING, MEDICAL CARE, AND HEATING?: NOT HARD AT ALL

## 2021-08-03 NOTE — PROGRESS NOTES
Medicare Annual Wellness Visit  Name: Cameron Seals Date: 8/3/2021   MRN: [id number] Sex: Male   Age: 69 y.o. Ethnicity: Non- / Non    : 1950 Race: White (non-)      Lesley Lopez is here for Medicare AWV    Screenings for behavioral, psychosocial and functional/safety risks, and cognitive dysfunction are all negative except as indicated below. These results, as well as other patient data from the [address] form, are documented in Flowsheets linked to this Encounter. No Known Allergies    Prior to Visit Medications    Medication Sig Taking? Authorizing Provider   sertraline (ZOLOFT) 50 MG tablet TAKE ONE (1) TABLET BY MOUTH  ONCE DAILY Yes Cullen Whiting MD   atorvastatin (LIPITOR) 40 MG tablet Take 1 tablet by mouth daily Yes Cullen Whiting MD   amLODIPine (NORVASC) 5 MG tablet Take 1 tablet by mouth daily Yes Braulio Lizama MD   omeprazole (PRILOSEC) 40 MG delayed release capsule TAKE ONE (1) CAPSULE BY MOUTH ONCE DAILY Yes Cullen Whiting MD   ezetimibe (ZETIA) 10 MG tablet Take 1 tablet by mouth daily Yes Braulio Lizama MD   pramipexole (MIRAPEX) 0.25 MG tablet Take 1 tablet by mouth nightly  Patient taking differently: Take 0.25 mg by mouth as needed  Yes Vicky Hoover PA-C   aspirin 81 MG EC tablet Take 81 mg by mouth daily.    Yes Historical Provider, MD       Past Medical History:   Diagnosis Date    Abnormal CT scan     per pt \"did CT scan and found spot on spine( T 8 lesion) back in March( ) did bx but did not show anything and now ( 2019) going to try do another bx\"    BPH (benign prostatic hyperplasia)     CAD (coronary artery disease)     follows with Dr Burton Box    Chronic cough 2020    COPD (chronic obstructive pulmonary disease) ([facility] [address] 2019    follow with Dr Guru Quijano Coronary arteriosclerosis     Excessive daytime sleepiness 2019    Family history of coronary artery disease     Former smoker 8/17/2020    GERD (gastroesophageal reflux disease)     H/O cardiac catheterization 03-    ([facility]) Total occ. LAD w/mild disease of LM and RCA.     H/O cardiovascular stress test 02-    (Exercise) EF 58%. Normal. Exercise cap. 11.0 METS.    H/O cardiovascular stress test 05-    (Cardiolite) EF 63%. Good exercise capacity. Hypertensive blood pressure response tp exercise. ETT neg for ischemia or arrhythmia. Cardiolite perfusion imaging reveals Ant. wall soft tissue attenuation artifact and mild ischemia of Inf. wall.  H/O colonoscopy with polypectomy 02-    Polyp in Rectum    H/O Doppler ultrasound 03-    (Carotid) No anatomical abnormalities. Normal    H/O echocardiogram 02- 6/14 EF 50-55%  abn LV diast stage 1 2/10EF 50-55%. Abn. LV Diastolic function Stage 1. Left Atrium borderline Dilated.  H/O gastritis     Years Ago    History of cardiac monitoring 11/18/2017    30 day monitoring: normal, Sinus rhythm noted. no symptoms and no a fib or arrhythmia noted    History of complete ECG 04-    [place] (hard of hearing)     noé hearing aides    HTN (hypertension)     Hx of cardiovascular stress test 10/28/2015    cardiolite-mild ischemia apical,EF64%    Hx of echocardiogram 6/9/2016    EF 55-60%. LA is mildly dilated. RV is mildly dilated. Mild MR. Mild tricuspid insufficiency. Significant VHD is absent.      Hyperlipidemia     Narcolepsy     Nocturnal hypoxemia 4/5/2019    Obstructive sleep apnea 04/05/2019    \"had sleep study done and they said I do not have sleep apnea, put me on oxygen at night \"    On home oxygen therapy     2l/nc at night only    Periodic limb movements of sleep 5/30/2019    Polyp of colon     Reflux     RLS (restless legs syndrome)     S/P CABG x 1 03-    Stricture of esophagus     S/P Dilation (Dr. Karen Edwards)    UTI (urinary tract infection) 04/2019    Wears glasses        Past Surgical History:   Procedure Laterality Date    BONE BIOPSY      per old chart bx T 8 3/2019    BONE BIOPSY N/A 07/05/2019    IR T-8    COLONOSCOPY  last one 2016 2008    COLONOSCOPY N/A 7/29/2021    COLONOSCOPY POLYPECTOMY SNARE/COLD BIOPSY performed [facility] Davin Mena MD [facility] [address] [facility]  03-    x 1 - [place] to LAD - Dr. Swapnil Mims Right 2019    TONSILLECTOMY  as a kid   Merari Marx [address]       Family History   Problem Relation Age of Onset    Heart Disease Mother         Pacemaker, MVR, CABG    Stroke Mother     Cancer Father         lung ca- smoker       CareTeam (Including outside providers/suppliers regularly involved in providing care):   Patient Care Team:  Сергей Calderón MD as PCP - General  Сергей Calderón MD as PCP - [facility] Empaneled Provider  Nayana Hylton MD as Consulting Physician (Cardiology)  Milagros Childers MD as Consulting Physician (Electrophysiology)    Wt Readings from Last 3 Encounters:   07/22/21 187 lb (84.8 kg)   06/08/21 191 lb 3.2 oz (86.7 kg)   05/26/21 191 lb 3.2 oz (86.7 kg)     There were no vitals filed for this visit. There is no height or weight on file to calculate BMI. Based upon direct observation of the patient, evaluation of cognition reveals recent and remote memory intact. Patient's complete Health Risk Assessment and screening values have been reviewed and are found in Flowsheets. The following problems were reviewed today and where indicated follow up appointments were made and/or referrals ordered. Positive Risk Factor Screenings with Interventions:          General Health and ACP:  General  In general, how would you say your health is?: Excellent  In the past 7 days, have you experienced any of the following?  New or Increased Pain, New or Increased Fatigue, Loneliness, Social Isolation, Stress or Anger?: None of These  Do you get the social and emotional support that you need?: Yes  Do you have a Living Will?: (!) No  Advance Directives     Power of  Living Will ACP-Advance Directive ACP-Power of     Not on File Not on File Not on File Not on File      General Health Risk Interventions:  · No Living Will: Advance Care Planning addressed with patient today    Health Habits/Nutrition:  Health Habits/Nutrition  Do you exercise for at least 20 minutes 2-3 times per week?: (!) No  Have you lost any weight without trying in the past 3 months?: No  Do you eat only one meal per day?: No  Have you seen the dentist within the past year?: Yes     Health Habits/Nutrition Interventions:  · Inadequate physical activity:  patient is not ready to increase his/her physical activity level at this time     Safety:  Safety  Do you have working smoke detectors?: (!) No  Have all throw rugs been removed or fastened?: Yes  Do you have non-slip mats or surfaces in all bathtubs/showers?: Yes  Do all of your stairways have a railing or banister?: Yes  Are your doorways, halls and stairs free of clutter?: Yes  Do you always fasten your seatbelt when you are in a car?: Yes  Safety Interventions:  · Home safety tips provided verbally     Personalized Preventive Plan   Current Health Maintenance Status  Immunization History   Administered Date(s) Administered    Influenza Virus Vaccine 11/20/2018    Influenza Whole 10/31/2011    Influenza, High Dose (Fluzone 65 yrs and older) 11/20/2018, 10/14/2020    Pneumococcal Conjugate 13-valent (Wilhemenia Sensing) 01/01/2019, 03/17/2019    Pneumococcal Polysaccharide ([id number]) 08/21/2019    Tdap (Boostrix, Adacel) 02/28/2014    Zoster Recombinant (Shingrix) 09/27/2019, 12/06/2019        Health Maintenance   Topic Date Due    AAA screen  Never done    PSA counseling  04/01/2020    Annual Wellness Visit (AWV)  07/16/2021    Flu vaccine (1) 09/01/2021    A1C test (Diabetic or Prediabetic)  05/28/2022    Lipid screen  05/28/2022    DTaP/Tdap/Td vaccine (2 - Td or Tdap) 02/28/2024    Colon cancer screen colonoscopy  07/29/2031    Shingles Vaccine  Completed    Pneumococcal 65+ years Vaccine  Completed    COVID-19 Vaccine  Completed    Hepatitis C screen  Completed    Hepatitis A vaccine  Aged Out    Hepatitis B vaccine  Aged Out    Hib vaccine  Aged Out    Meningococcal (ACWY) vaccine  Aged Out     Recommendations for [employer] Due: see orders and patient instructions/AVS. JEN requested PSA results/office notes from Dr. Pablo Skiff office. Unable to obtain 3 vital signs due to patient not having equipment to take blood pressure/temperature. Recommended screening schedule for the next 5-10 years is provided to the patient in written form: see Patient Instructions/AVS.    Charles JOY LPN, 9/9/6278, performed the documented evaluation under the direct supervision of the attending physician. Barney Piedra, was evaluated through a synchronous (real-time) audio-video encounter. The patient (or guardian if applicable) is aware that this is a billable service. Verbal consent to proceed has been obtained within the past 12 months. The visit was conducted pursuant to the emergency declaration under the [address] [place] [address] authority and the [employer] General Act. Patient identification was verified, and a caregiver was present when appropriate. The patient was located in the state of [place], where the provider was credentialed to provide care. Total time spent for this encounter: Not billed by time    --Charles Albert LPN on 1/3/1916 at 6:73 PM    An electronic signature was used to authenticate this note. This encounter was performed under Ilana archer MDs, direct supervision, 8/3/2021.

## 2021-08-06 ENCOUNTER — TELEPHONE (OUTPATIENT)
Dept: PULMONOLOGY | Age: 71
End: 2021-08-06

## 2021-08-06 NOTE — TELEPHONE ENCOUNTER
Called and rescheduled pt for 8/19 due to Dr. Kylie Krueger being out. Chief Complaint   Patient presents with   • Vaginal Bleeding     Medications reviewed and updated.  Denies known Latex allergy or symptoms of Latex sensitivity.  Social History     Tobacco Use   Smoking Status Never Smoker   Smokeless Tobacco Never Used     Health Maintenance Due   Topic Date Due   • Influenza Vaccine (1) 09/01/2020   • Depression Screening  10/09/2020   • Cervical Cancer Screening  01/07/2021       Patient is due for topics as listed above but is not proceeding with Immunization(s) Influenza and Cervical cancer screening at this time.       Advance Directives:  not discussed

## 2021-08-16 ENCOUNTER — OFFICE VISIT (OUTPATIENT)
Dept: CARDIOLOGY CLINIC | Age: 71
End: 2021-08-16
Payer: MEDICARE

## 2021-08-16 VITALS
HEIGHT: 66 IN | HEART RATE: 65 BPM | WEIGHT: 190.8 LBS | DIASTOLIC BLOOD PRESSURE: 70 MMHG | BODY MASS INDEX: 30.67 KG/M2 | SYSTOLIC BLOOD PRESSURE: 118 MMHG

## 2021-08-16 DIAGNOSIS — Z87.891 FORMER SMOKER: ICD-10-CM

## 2021-08-16 DIAGNOSIS — Z95.1 S/P CABG X 1: ICD-10-CM

## 2021-08-16 DIAGNOSIS — I10 ESSENTIAL HYPERTENSION: ICD-10-CM

## 2021-08-16 DIAGNOSIS — I25.10 CORONARY ARTERY DISEASE INVOLVING NATIVE CORONARY ARTERY OF NATIVE HEART WITHOUT ANGINA PECTORIS: Primary | ICD-10-CM

## 2021-08-16 DIAGNOSIS — G25.81 RESTLESS LEG SYNDROME: ICD-10-CM

## 2021-08-16 DIAGNOSIS — K21.9 GASTROESOPHAGEAL REFLUX DISEASE WITHOUT ESOPHAGITIS: ICD-10-CM

## 2021-08-16 DIAGNOSIS — E78.5 HYPERLIPIDEMIA, UNSPECIFIED HYPERLIPIDEMIA TYPE: ICD-10-CM

## 2021-08-16 PROCEDURE — 3017F COLORECTAL CA SCREEN DOC REV: CPT | Performed by: INTERNAL MEDICINE

## 2021-08-16 PROCEDURE — G8427 DOCREV CUR MEDS BY ELIG CLIN: HCPCS | Performed by: INTERNAL MEDICINE

## 2021-08-16 PROCEDURE — 4040F PNEUMOC VAC/ADMIN/RCVD: CPT | Performed by: INTERNAL MEDICINE

## 2021-08-16 PROCEDURE — 1036F TOBACCO NON-USER: CPT | Performed by: INTERNAL MEDICINE

## 2021-08-16 PROCEDURE — G8417 CALC BMI ABV UP PARAM F/U: HCPCS | Performed by: INTERNAL MEDICINE

## 2021-08-16 PROCEDURE — 99213 OFFICE O/P EST LOW 20 MIN: CPT | Performed by: INTERNAL MEDICINE

## 2021-08-16 PROCEDURE — 1123F ACP DISCUSS/DSCN MKR DOCD: CPT | Performed by: INTERNAL MEDICINE

## 2021-08-16 RX ORDER — EZETIMIBE 10 MG/1
10 TABLET ORAL DAILY
Qty: 30 TABLET | Refills: 6 | Status: SHIPPED | OUTPATIENT
Start: 2021-08-16 | End: 2022-04-27

## 2021-08-16 NOTE — PATIENT INSTRUCTIONS
Please be informed that if you contact our office outside of normal business hours the physician on call cannot help with any scheduling or rescheduling issues, procedure instruction questions or any type of medication issue. We advise you for any urgent/emergency that you go to the nearest emergency room! PLEASE CALL OUR OFFICE DURING NORMAL BUSINESS HOURS    Monday - Friday   8 am to 5 pm    [place]: [phone]    [place]: [phone]    [place]:  [phone]  CAD:Yes   clinically stable. Patient is on optimal medical regimen ( see medication list above )  - Patient is currently  asymptomatic from CAD.          - changes in  treatment:   no           - Testing ordered:  no  [place] classification: 1  [place] RISK SCORE:  [place] RISK SCORE:  HTN:well controlled on current medical regimen, see list above.              - changes in  treatment:   no   CARDIOMYOPATHY: None known   CONGESTIVE HEART FAILURE: NO KNOWN HISTORY.      VHD: No significant VHD noted  DYSLIPIDEMIA: Patient's profile is at / near [facility] current medical regimen wellyes,                                                            See most recent Lab values in Labs section above. OTHER RELEVANT DIAGNOSIS:as noted in patient's active problem list:  TESTS ORDERED: none this visit.  [facility] previously ordered tests reviewed.  MEDICATIONS: CPM   Office f/u in six months.

## 2021-08-16 NOTE — LETTER
[address] [address]  Phone: [phone]  Fax: [phone]    Claribel Tamayo MD    August 16, 2021     Art Gonzalez, [address] [place] [address]    Patient: Erica Morgan   MR Number: [id number]   YOB: 1950   Date of Visit: 8/16/2021       Dear Art Gonzalez:    Thank you for referring Paula Pickett to me for evaluation/treatment. Below are the relevant portions of my assessment and plan of care. If you have questions, please do not hesitate to call me. I look forward to following Angelique Abdi along with you.     Sincerely,        Claribel Tamayo MD

## 2021-08-16 NOTE — PROGRESS NOTES
claudication, edema, irregular heartbeat, murmur, palpitations or shortness of breath  Musculoskeletal: Negative for muscle pain, muscular weakness, negative for pain in arm and leg or swelling in foot and leg    Objective:  /70   Pulse 65   Ht 5' 6\" (1.676 m)   Wt 190 lb 12.8 oz (86.5 kg)   BMI 30.80 kg/m²   Wt Readings from Last 3 Encounters:   08/16/21 190 lb 12.8 oz (86.5 kg)   07/22/21 187 lb (84.8 kg)   06/08/21 191 lb 3.2 oz (86.7 kg)     Body mass index is 30.8 kg/m². GENERAL - Alert, oriented, pleasant, in no apparent distress. EYES: No jaundice, no conjunctival pallor. Neck - Supple. No jugular venous distention noted. No carotid bruits. Cardiovascular  Normal S1 and S2 without obvious murmur or gallop. Extremities - No cyanosis, clubbing, or significant edema. Pulmonary  No respiratory distress. No wheezes or rales.       Lab Review   Lab Results   Component Value Date    TROPONINT <0.010 03/14/2019     Lab Results   Component Value Date    PROBNP 632.8 03/14/2019     Lab Results   Component Value Date    INR 1.00 07/05/2019    INR 1.07 03/19/2019     Lab Results   Component Value Date    LABA1C 6.1 05/28/2021    LABA1C 6.3 03/16/2019     Lab Results   Component Value Date    WBC 6.0 05/28/2021    WBC 5.4 07/16/2020    HCT 46.0 05/28/2021    HCT 52.1 07/16/2020    MCV 86.4 05/28/2021    MCV 87.4 07/16/2020     05/28/2021     07/16/2020     Lab Results   Component Value Date    CHOL 122 07/16/2020    CHOL 149 08/21/2019    TRIG 129 07/16/2020    TRIG 145 08/21/2019    HDL 42 05/28/2021    HDL 40 07/16/2020    LDLCALC 44 05/28/2021    LDLCALC 56 07/16/2020    LDLDIRECT 67 02/08/2012    LDLDIRECT 66 02/16/2011     Lab Results   Component Value Date    ALT 31 05/28/2021    ALT 34 07/16/2020    AST 21 05/28/2021    AST 27 07/16/2020     BMP:    Lab Results   Component Value Date     05/28/2021     07/16/2020    K 4.5 05/28/2021    K 4.7 07/16/2020     05/28/2021     07/16/2020    CO2 23 05/28/2021    CO2 23 07/16/2020    BUN 12 05/28/2021    BUN 16 07/16/2020    CREATININE 0.9 05/28/2021    CREATININE 0.8 07/16/2020     CMP:   Lab Results   Component Value Date     05/28/2021     07/16/2020    K 4.5 05/28/2021    K 4.7 07/16/2020     05/28/2021     07/16/2020    CO2 23 05/28/2021    CO2 23 07/16/2020    BUN 12 05/28/2021    BUN 16 07/16/2020    CREATININE 0.9 05/28/2021    CREATININE 0.8 07/16/2020    PROT 7.0 05/28/2021    PROT 7.3 07/16/2020     Lab Results   Component Value Date    TSH 2.3 10/14/2016    TSH 2.4 08/16/2013    TSHHS 3.950 03/14/2019     Cardiolite Normal 10/2017     3/2019 ECHO:   Left ventricular systolic function is normal.   Ejection fraction is visually estimated at 50-55%.   Mild left ventricular hypertrophy.   Grade I diastolic dysfunction.   Mildly dilated left atrium.   No evidence of any pericardial effusion. QUALITY MEASURES REVIEWED:  1.CAD:Patient is taking anti platelet agent:Yes  2. DYSLIPIDEMIA: Patient is on cholesterol lowering medication:Yes   3. Beta-Blocker therapy for CAD, if prior Myocardial Infarction:No   4. Counselled regarding smoking cessation. No   Patient does not Smoke. 5.Anticoagulation therapy (for A.Fib) No   Does Not have A.Fib.  6.Discussed weight management strategies. Assessment & Plan:  Primary / Secondary prevention is the goal by aggressive risk modification, healthy and therapeutic life style changes for cardiovascular risk reduction. Various goals are discussed and multiple questions answered.     CAD:Yes   clinically stable. Patient is on optimal medical regimen ( see medication list above )  - Patient is currently  asymptomatic from CAD.             - changes in  treatment:   no           - Testing ordered:  no  [place] classification: 1  [place] RISK SCORE:  [place] RISK SCORE:  HTN:well controlled on current medical regimen, see list above.              - changes in  treatment:   no   CARDIOMYOPATHY: None known   CONGESTIVE HEART FAILURE: NO KNOWN HISTORY.      VHD: No significant VHD noted  DYSLIPIDEMIA: Patient's profile is at / near [facility] current medical regimen wellyes,                                                            See most recent Lab values in Labs section above. OTHER RELEVANT DIAGNOSIS:as noted in patient's active problem list:  TESTS ORDERED: none this visit.  Kassidy Jj previously ordered tests reviewed.  MEDICATIONS: CPM   Office f/u in six months.

## 2021-09-01 ENCOUNTER — OFFICE VISIT (OUTPATIENT)
Dept: PULMONOLOGY | Age: 71
End: 2021-09-01
Payer: MEDICARE

## 2021-09-01 VITALS
SYSTOLIC BLOOD PRESSURE: 135 MMHG | HEART RATE: 63 BPM | DIASTOLIC BLOOD PRESSURE: 90 MMHG | OXYGEN SATURATION: 94 % | WEIGHT: 190 LBS | HEIGHT: 66 IN | BODY MASS INDEX: 30.53 KG/M2

## 2021-09-01 DIAGNOSIS — J42 CHRONIC BRONCHITIS, UNSPECIFIED CHRONIC BRONCHITIS TYPE (HCC): Primary | ICD-10-CM

## 2021-09-01 DIAGNOSIS — Z87.891 FORMER SMOKER: ICD-10-CM

## 2021-09-01 PROBLEM — J96.01 ACUTE RESPIRATORY FAILURE WITH HYPOXIA (HCC): Status: RESOLVED | Noted: 2019-03-14 | Resolved: 2021-09-01

## 2021-09-01 PROCEDURE — 1036F TOBACCO NON-USER: CPT | Performed by: INTERNAL MEDICINE

## 2021-09-01 PROCEDURE — 99213 OFFICE O/P EST LOW 20 MIN: CPT | Performed by: INTERNAL MEDICINE

## 2021-09-01 PROCEDURE — G8926 SPIRO NO PERF OR DOC: HCPCS | Performed by: INTERNAL MEDICINE

## 2021-09-01 PROCEDURE — G8427 DOCREV CUR MEDS BY ELIG CLIN: HCPCS | Performed by: INTERNAL MEDICINE

## 2021-09-01 PROCEDURE — G8417 CALC BMI ABV UP PARAM F/U: HCPCS | Performed by: INTERNAL MEDICINE

## 2021-09-01 PROCEDURE — 3023F SPIROM DOC REV: CPT | Performed by: INTERNAL MEDICINE

## 2021-09-01 PROCEDURE — 3017F COLORECTAL CA SCREEN DOC REV: CPT | Performed by: INTERNAL MEDICINE

## 2021-09-01 PROCEDURE — 1123F ACP DISCUSS/DSCN MKR DOCD: CPT | Performed by: INTERNAL MEDICINE

## 2021-09-01 PROCEDURE — 4040F PNEUMOC VAC/ADMIN/RCVD: CPT | Performed by: INTERNAL MEDICINE

## 2021-11-05 ENCOUNTER — OFFICE VISIT (OUTPATIENT)
Dept: FAMILY MEDICINE CLINIC | Age: 71
End: 2021-11-05
Payer: MEDICARE

## 2021-11-05 VITALS
SYSTOLIC BLOOD PRESSURE: 124 MMHG | DIASTOLIC BLOOD PRESSURE: 70 MMHG | HEART RATE: 61 BPM | WEIGHT: 190.44 LBS | OXYGEN SATURATION: 94 % | HEIGHT: 66 IN | BODY MASS INDEX: 30.61 KG/M2

## 2021-11-05 DIAGNOSIS — I10 PRIMARY HYPERTENSION: Primary | ICD-10-CM

## 2021-11-05 DIAGNOSIS — E78.5 HYPERLIPIDEMIA, UNSPECIFIED HYPERLIPIDEMIA TYPE: ICD-10-CM

## 2021-11-05 DIAGNOSIS — G25.81 RESTLESS LEG SYNDROME: ICD-10-CM

## 2021-11-05 DIAGNOSIS — K21.9 GASTROESOPHAGEAL REFLUX DISEASE WITHOUT ESOPHAGITIS: ICD-10-CM

## 2021-11-05 DIAGNOSIS — F41.9 ANXIETY: ICD-10-CM

## 2021-11-05 DIAGNOSIS — R41.3 MEMORY CHANGE: ICD-10-CM

## 2021-11-05 DIAGNOSIS — I25.10 CORONARY ARTERY DISEASE INVOLVING NATIVE CORONARY ARTERY OF NATIVE HEART WITHOUT ANGINA PECTORIS: ICD-10-CM

## 2021-11-05 PROCEDURE — 1036F TOBACCO NON-USER: CPT | Performed by: FAMILY MEDICINE

## 2021-11-05 PROCEDURE — G8484 FLU IMMUNIZE NO ADMIN: HCPCS | Performed by: FAMILY MEDICINE

## 2021-11-05 PROCEDURE — G8417 CALC BMI ABV UP PARAM F/U: HCPCS | Performed by: FAMILY MEDICINE

## 2021-11-05 PROCEDURE — G8427 DOCREV CUR MEDS BY ELIG CLIN: HCPCS | Performed by: FAMILY MEDICINE

## 2021-11-05 PROCEDURE — 99214 OFFICE O/P EST MOD 30 MIN: CPT | Performed by: FAMILY MEDICINE

## 2021-11-05 PROCEDURE — 4040F PNEUMOC VAC/ADMIN/RCVD: CPT | Performed by: FAMILY MEDICINE

## 2021-11-05 PROCEDURE — 3017F COLORECTAL CA SCREEN DOC REV: CPT | Performed by: FAMILY MEDICINE

## 2021-11-05 PROCEDURE — 1123F ACP DISCUSS/DSCN MKR DOCD: CPT | Performed by: FAMILY MEDICINE

## 2021-11-05 RX ORDER — PRAMIPEXOLE DIHYDROCHLORIDE 0.25 MG/1
0.25 TABLET ORAL NIGHTLY
Qty: 30 TABLET | Refills: 5 | Status: SHIPPED | OUTPATIENT
Start: 2021-11-05

## 2021-11-05 RX ORDER — ATORVASTATIN CALCIUM 40 MG/1
40 TABLET, FILM COATED ORAL DAILY
Qty: 30 TABLET | Refills: 5 | Status: SHIPPED | OUTPATIENT
Start: 2021-11-05 | End: 2022-08-23

## 2021-11-05 RX ORDER — OMEPRAZOLE 40 MG/1
CAPSULE, DELAYED RELEASE ORAL
Qty: 30 CAPSULE | Refills: 5 | Status: SHIPPED | OUTPATIENT
Start: 2021-11-05 | End: 2022-06-01 | Stop reason: SDUPTHER

## 2021-11-05 ASSESSMENT — ENCOUNTER SYMPTOMS
WHEEZING: 0
BLOOD IN STOOL: 0
NAUSEA: 0
EYE PAIN: 0
TROUBLE SWALLOWING: 0
VOMITING: 0
ABDOMINAL PAIN: 0
SHORTNESS OF BREATH: 0
CHEST TIGHTNESS: 0
DIARRHEA: 0

## 2021-11-05 NOTE — PROGRESS NOTES
11/5/2021    Ernesto Prasad    Chief Complaint   Patient presents with    3 Month Follow-Up     Patient c/o memory issues    Hypertension       HPI  History was obtained from patient. Lawson Alfredo is a 69 y.o. male who presents today with routine follow-up on anxiety depression, GERD, coronary disease, restless leg syndrome, and history of T8 tumor. He is actually doing very well at this time working part-time pleasant good mood. Restless leg symptoms are stable GERD is controlled no increased anxiety depression. He is noted a little bit of memory change but not bad, forgets what he went into room to get little bit and name word finding. No focalized neurologic deficit with this. Worse with stress MMSE was 26 out of 30 patient is staying physically and mentally active at home to try some over-the-counter Prevagen. He is not really having trouble with speech and is not getting lost not disoriented etc.  Please note labs in May of this year look good. REVIEW OF SYMPTOMS    Review of Systems   Constitutional: Negative for activity change and fatigue. HENT: Negative for congestion, hearing loss, mouth sores and trouble swallowing. Eyes: Negative for pain and visual disturbance. Respiratory: Negative for chest tightness, shortness of breath and wheezing. Cardiovascular: Negative for chest pain and palpitations. Gastrointestinal: Negative for abdominal pain, blood in stool, diarrhea, nausea and vomiting. Endocrine: Negative for polydipsia and polyuria. Genitourinary: Negative for dysuria, frequency and urgency. Musculoskeletal: Negative for arthralgias, gait problem and neck stiffness. Skin: Negative for rash. Allergic/Immunologic: Negative for environmental allergies. Neurological: Negative for dizziness, seizures, speech difficulty and weakness. Slight decrease in memory noted. No disorientation or speech disturbance. Does not get lost.   Hematological: Does not bruise/bleed easily. Psychiatric/Behavioral: Positive for dysphoric mood. Negative for agitation, confusion, hallucinations and suicidal ideas. The patient is nervous/anxious. PAST MEDICAL HISTORY  Past Medical History:   Diagnosis Date    Abnormal CT scan     per pt \"did CT scan and found spot on spine( T 8 lesion) back in March( 2019) did bx but did not show anything and now ( 7/5/2019) going to try do another bx\"    BPH (benign prostatic hyperplasia)     CAD (coronary artery disease)     follows with Dr Steve Scott    Chronic cough 2/18/2020    COPD (chronic obstructive pulmonary disease) ([address] 4/5/2019    follow with Dr Edilberto Martinez Coronary arteriosclerosis     Excessive daytime sleepiness 4/5/2019    Family history of coronary artery disease     Former smoker 8/17/2020    GERD (gastroesophageal reflux disease)     H/O cardiac catheterization 03-    ([facility]) Total occ. LAD w/mild disease of LM and RCA.     H/O cardiovascular stress test 02-    (Exercise) EF 58%. Normal. Exercise cap. 11.0 METS.    H/O cardiovascular stress test 05-    (Cardiolite) EF 63%. Good exercise capacity. Hypertensive blood pressure response tp exercise. ETT neg for ischemia or arrhythmia. Cardiolite perfusion imaging reveals Ant. wall soft tissue attenuation artifact and mild ischemia of Inf. wall.  H/O colonoscopy with polypectomy 02-    Polyp in Rectum    H/O Doppler ultrasound 03-    (Carotid) No anatomical abnormalities. Normal    H/O echocardiogram 02- 6/14 EF 50-55%  abn LV diast stage 1 2/10EF 50-55%. Abn. LV Diastolic function Stage 1. Left Atrium borderline Dilated.  H/O gastritis     Years Ago    History of cardiac monitoring 11/18/2017    30 day monitoring: normal, Sinus rhythm noted.  no symptoms and no a fib or arrhythmia noted    History of complete ECG 04-    [place] (hard of hearing)     noé hearing aides    HTN (hypertension)     Hx of cardiovascular stress test 10/28/2015    cardiolite-mild ischemia apical,EF64%    Hx of echocardiogram 2016    EF 55-60%. LA is mildly dilated. RV is mildly dilated. Mild MR. Mild tricuspid insufficiency. Significant VHD is absent.  Hyperlipidemia     Narcolepsy     Nocturnal hypoxemia 2019    Obstructive sleep apnea 2019    \"had sleep study done and they said I do not have sleep apnea, put me on oxygen at night \"    On home oxygen therapy     2l/nc at night only    Periodic limb movements of sleep 2019    Polyp of colon     Reflux     RLS (restless legs syndrome)     S/P CABG x 1 2002    Stricture of esophagus     S/P Dilation (Dr. Aldon Schilder)    UTI (urinary tract infection) 2019    Wears glasses        FAMILY HISTORY  Family History   Problem Relation Age of Onset    Heart Disease Mother         Pacemaker, MVR, CABG    Stroke Mother     Cancer Father         lung ca- smoker       SOCIAL HISTORY  Social History     Socioeconomic History    Marital status:      Spouse name: Not on file    Number of children: 2    Years of education: Not on file    Highest education level: Not on file   Occupational History    Not on file   Tobacco Use    Smoking status: Former Smoker     Packs/day: 0.00     Years: 10.00     Pack years: 0.00     Types: Pipe     Quit date: 1985     Years since quittin.8    Smokeless tobacco: Never Used   Vaping Use    Vaping Use: Never used   Substance and Sexual Activity    Alcohol use:  Yes     Alcohol/week: 0.0 standard drinks     Comment: occ alcohol/ caffeine 2 cups of coffee a day    Drug use: No    Sexual activity: Not on file   Other Topics Concern    Not on file   Social History Narrative    Not on file     Social Determinants of Health     Financial Resource Strain: Low Risk     Difficulty of Paying Living Expenses: Not hard at all   Food Insecurity: No Food Insecurity    Worried About [address] [employer] in the Last Year: Never true    Ran Out of Food in the Last Year: Never true   Transportation Needs:     Lack of Transportation (Medical):  Lack of Transportation (Non-Medical):    Physical Activity:     Days of Exercise per Week:     Minutes of Exercise per Session:    Stress:     Feeling of Stress :    Social Connections:     Frequency of Communication with Friends and Family:     Frequency of Social Gatherings with Friends and Family:     Attends Bahai Services:     Active Member of Clubs or Organizations:     Attends Club or Organization Meetings:     Marital Status:    Intimate Partner Violence:     Fear of Current or Ex-Partner:     Emotionally Abused:     Physically Abused:     Sexually Abused:         SURGICAL HISTORY  Past Surgical History:   Procedure Laterality Date    BONE BIOPSY      per old chart bx T 8 3/2019    BONE BIOPSY N/A 07/05/2019    IR T-8    COLONOSCOPY  last one 2016 2008    COLONOSCOPY N/A 7/29/2021    COLONOSCOPY POLYPECTOMY SNARE/COLD BIOPSY performed by Marlee Mcintosh MD at [place] [id number]  03-    x 1 - [place] to [place] - Dr. Tobias Hennessy Right 2019    TONSILLECTOMY  as a kid    VASECTOMY  1976                 CURRENT MEDICATIONS  Current Outpatient Medications   Medication Sig Dispense Refill    pramipexole (MIRAPEX) 0.25 MG tablet Take 1 tablet by mouth nightly 30 tablet 5    omeprazole (PRILOSEC) 40 MG delayed release capsule TAKE ONE (1) CAPSULE BY MOUTH ONCE DAILY 30 capsule 5    sertraline (ZOLOFT) 50 MG tablet TAKE ONE (1) TABLET BY MOUTH  ONCE DAILY 30 tablet 3    atorvastatin (LIPITOR) 40 MG tablet Take 1 tablet by mouth daily 30 tablet 5    ezetimibe (ZETIA) 10 MG tablet Take 1 tablet by mouth daily 30 tablet 6    amLODIPine (NORVASC) 5 MG tablet Take 1 tablet by mouth daily 30 tablet 5    aspirin 81 MG EC tablet Take 81 mg by mouth daily. No current facility-administered medications for this visit. ALLERGIES  No Known Allergies    PHYSICAL EXAM    /70   Pulse 61   Ht 5' 6\" (1.676 m)   Wt 190 lb 7 oz (86.4 kg)   SpO2 94%   BMI 30.74 kg/m²     Physical Exam  Vitals and nursing note reviewed. Constitutional:       General: He is not in acute distress. Appearance: He is well-developed. He is not ill-appearing or toxic-appearing. HENT:      Head: Normocephalic and atraumatic. Nose: Nose normal.      Mouth/Throat:      Mouth: Mucous membranes are moist.      Pharynx: Oropharynx is clear. Eyes:      Pupils: Pupils are equal, round, and reactive to light. Cardiovascular:      Rate and Rhythm: Normal rate and regular rhythm. Heart sounds: Normal heart sounds. No murmur heard. No gallop. Pulmonary:      Effort: Pulmonary effort is normal. No respiratory distress. Breath sounds: Normal breath sounds. No wheezing, rhonchi or rales. Abdominal:      Palpations: Abdomen is soft. Musculoskeletal:         General: No swelling or deformity. Normal range of motion. Cervical back: Normal range of motion and neck supple. No rigidity. Lymphadenopathy:      Cervical: No cervical adenopathy. Skin:     General: Skin is warm and dry. Coloration: Skin is not jaundiced. Neurological:      General: No focal deficit present. Mental Status: He is alert and oriented to person, place, and time. Mental status is at baseline. Cranial Nerves: No cranial nerve deficit. Motor: No weakness. Psychiatric:         Mood and Affect: Mood normal.         Behavior: Behavior normal.         Thought Content: Thought content normal.         ASSESSMENT & PLAN     Diagnosis Orders   1. Primary hypertension     2. Restless leg syndrome  pramipexole (MIRAPEX) 0.25 MG tablet   3. Hyperlipidemia, unspecified hyperlipidemia type     4. Gastroesophageal reflux disease without esophagitis     5. Anxiety     6.  Coronary artery disease involving native coronary artery of native heart

## 2021-12-07 ENCOUNTER — PROCEDURE VISIT (OUTPATIENT)
Dept: PULMONOLOGY | Age: 71
End: 2021-12-07
Payer: MEDICARE

## 2021-12-07 DIAGNOSIS — J42 CHRONIC BRONCHITIS, UNSPECIFIED CHRONIC BRONCHITIS TYPE (HCC): ICD-10-CM

## 2021-12-07 DIAGNOSIS — Z87.891 FORMER SMOKER: ICD-10-CM

## 2021-12-07 DIAGNOSIS — J42 CHRONIC BRONCHITIS, UNSPECIFIED CHRONIC BRONCHITIS TYPE (HCC): Primary | ICD-10-CM

## 2021-12-07 DIAGNOSIS — R06.09 DYSPNEA ON EXERTION: ICD-10-CM

## 2021-12-07 LAB
EXPIRATORY TIME-POST: NORMAL
EXPIRATORY TIME: NORMAL
FEF 25-75% %CHNG: NORMAL
FEF 25-75% %PRED-POST: NORMAL
FEF 25-75% %PRED-PRE: NORMAL
FEF 25-75% PRED: NORMAL
FEF 25-75%-POST: NORMAL
FEF 25-75%-PRE: NORMAL
FEV1 %PRED-POST: 115.4 %
FEV1 %PRED-PRE: 116.2 %
FEV1 PRED: 2.69 L
FEV1-POST: 3.1 L
FEV1-PRE: 3.12 L
FEV1/FVC %PRED-POST: 115.9 %
FEV1/FVC %PRED-PRE: 108.7 %
FEV1/FVC PRED: 73.1 %
FEV1/FVC-POST: 84.7 %
FEV1/FVC-PRE: 79.5 %
FVC %PRED-POST: 99 L
FVC %PRED-PRE: 106.2 %
FVC PRED: 3.7 L
FVC-POST: 3.66 L
FVC-PRE: 3.93 L
PEF %PRED-POST: NORMAL
PEF %PRED-PRE: NORMAL
PEF PRED: NORMAL
PEF%CHNG: NORMAL
PEF-POST: NORMAL
PEF-PRE: NORMAL

## 2021-12-07 PROCEDURE — G8926 SPIRO NO PERF OR DOC: HCPCS | Performed by: INTERNAL MEDICINE

## 2021-12-07 PROCEDURE — 3017F COLORECTAL CA SCREEN DOC REV: CPT | Performed by: INTERNAL MEDICINE

## 2021-12-07 PROCEDURE — 99213 OFFICE O/P EST LOW 20 MIN: CPT | Performed by: INTERNAL MEDICINE

## 2021-12-07 PROCEDURE — 1123F ACP DISCUSS/DSCN MKR DOCD: CPT | Performed by: INTERNAL MEDICINE

## 2021-12-07 PROCEDURE — G8484 FLU IMMUNIZE NO ADMIN: HCPCS | Performed by: INTERNAL MEDICINE

## 2021-12-07 PROCEDURE — 3023F SPIROM DOC REV: CPT | Performed by: INTERNAL MEDICINE

## 2021-12-07 PROCEDURE — 4040F PNEUMOC VAC/ADMIN/RCVD: CPT | Performed by: INTERNAL MEDICINE

## 2021-12-07 PROCEDURE — G8417 CALC BMI ABV UP PARAM F/U: HCPCS | Performed by: INTERNAL MEDICINE

## 2021-12-07 PROCEDURE — G8427 DOCREV CUR MEDS BY ELIG CLIN: HCPCS | Performed by: INTERNAL MEDICINE

## 2021-12-07 PROCEDURE — 1036F TOBACCO NON-USER: CPT | Performed by: INTERNAL MEDICINE

## 2021-12-07 ASSESSMENT — PULMONARY FUNCTION TESTS
FEV1/FVC_PERCENT_PREDICTED_PRE: 108.7
FVC_POST: 3.66
FEV1_POST: 3.10
FVC_PERCENT_PREDICTED_POST: 99.0
FEV1/FVC_PRE: 79.5
FVC_PREDICTED: 3.70
FEV1_PERCENT_PREDICTED_PRE: 116.2
FEV1_PREDICTED: 2.69
FVC_PRE: 3.93
FEV1/FVC_PERCENT_PREDICTED_POST: 115.9
FVC_PERCENT_PREDICTED_PRE: 106.2
FEV1/FVC_POST: 84.7
FEV1_PRE: 3.12
FEV1/FVC_PREDICTED: 73.1
FEV1_PERCENT_PREDICTED_POST: 115.4

## 2021-12-16 RX ORDER — AMLODIPINE BESYLATE 5 MG/1
5 TABLET ORAL DAILY
Qty: 30 TABLET | Refills: 5 | Status: SHIPPED | OUTPATIENT
Start: 2021-12-16 | End: 2022-06-15

## 2022-02-07 ENCOUNTER — OFFICE VISIT (OUTPATIENT)
Dept: CARDIOLOGY CLINIC | Age: 72
End: 2022-02-07
Payer: MEDICARE

## 2022-02-07 VITALS
DIASTOLIC BLOOD PRESSURE: 60 MMHG | SYSTOLIC BLOOD PRESSURE: 110 MMHG | HEIGHT: 66 IN | HEART RATE: 65 BPM | WEIGHT: 194.4 LBS | BODY MASS INDEX: 31.24 KG/M2

## 2022-02-07 DIAGNOSIS — R07.9 CHEST PAIN, UNSPECIFIED TYPE: ICD-10-CM

## 2022-02-07 DIAGNOSIS — E78.5 HYPERLIPIDEMIA, UNSPECIFIED HYPERLIPIDEMIA TYPE: ICD-10-CM

## 2022-02-07 DIAGNOSIS — I10 PRIMARY HYPERTENSION: ICD-10-CM

## 2022-02-07 DIAGNOSIS — Z95.1 S/P CABG X 1: ICD-10-CM

## 2022-02-07 DIAGNOSIS — Z87.891 FORMER SMOKER: ICD-10-CM

## 2022-02-07 DIAGNOSIS — J42 CHRONIC BRONCHITIS, UNSPECIFIED CHRONIC BRONCHITIS TYPE (HCC): ICD-10-CM

## 2022-02-07 DIAGNOSIS — I25.10 CORONARY ARTERY DISEASE INVOLVING NATIVE CORONARY ARTERY OF NATIVE HEART WITHOUT ANGINA PECTORIS: Primary | ICD-10-CM

## 2022-02-07 PROCEDURE — 93000 ELECTROCARDIOGRAM COMPLETE: CPT | Performed by: INTERNAL MEDICINE

## 2022-02-07 PROCEDURE — G8427 DOCREV CUR MEDS BY ELIG CLIN: HCPCS | Performed by: INTERNAL MEDICINE

## 2022-02-07 PROCEDURE — 1123F ACP DISCUSS/DSCN MKR DOCD: CPT | Performed by: INTERNAL MEDICINE

## 2022-02-07 PROCEDURE — G8484 FLU IMMUNIZE NO ADMIN: HCPCS | Performed by: INTERNAL MEDICINE

## 2022-02-07 PROCEDURE — 3023F SPIROM DOC REV: CPT | Performed by: INTERNAL MEDICINE

## 2022-02-07 PROCEDURE — 3017F COLORECTAL CA SCREEN DOC REV: CPT | Performed by: INTERNAL MEDICINE

## 2022-02-07 PROCEDURE — G8417 CALC BMI ABV UP PARAM F/U: HCPCS | Performed by: INTERNAL MEDICINE

## 2022-02-07 PROCEDURE — 1036F TOBACCO NON-USER: CPT | Performed by: INTERNAL MEDICINE

## 2022-02-07 PROCEDURE — 99214 OFFICE O/P EST MOD 30 MIN: CPT | Performed by: INTERNAL MEDICINE

## 2022-02-07 PROCEDURE — 4040F PNEUMOC VAC/ADMIN/RCVD: CPT | Performed by: INTERNAL MEDICINE

## 2022-02-07 RX ORDER — NITROGLYCERIN 0.4 MG/1
0.4 TABLET SUBLINGUAL EVERY 5 MIN PRN
Qty: 25 TABLET | Refills: 3 | Status: SHIPPED | OUTPATIENT
Start: 2022-02-07

## 2022-02-07 NOTE — PROGRESS NOTES
Maritza Adhikari is a 66 y.o. male who has    CHIEF COMPLAINT AS FOLLOWS:  CHEST PAIN: Patient  C/O chest discomfort since last month. Not exertion related but gets SOB with it. Some times radiates to the back. May be similler to the symptoms prior to CABG.    SOB: Has SOB with exertion but no change over previous noted.                 LEG EDEMA: No leg edema   PALPITATIONS: Denies any C/O Palpitations                                   DIZZINESS: No C/O Dizziness   SYNCOPE: None   OTHER/ ADDITIONAL COMPLAINTS:                                     HPI: Patient is here for F/U on his CAD, HTN & Dyslipidemia problems. CAD: Patient has known CAD. Had CABG in the past.  HTN: Patient has known essential HTN. Has been treated with guideline recommended medical / physical/ diet therapy as stated below. Dyslipidemia: Patient has known mixed dyslipidemia. Has been treated with guideline recommended medical / physical/ diet therapy as stated below. Current Outpatient Medications   Medication Sig Dispense Refill    amLODIPine (NORVASC) 5 MG tablet Take 1 tablet by mouth daily 30 tablet 5    pramipexole (MIRAPEX) 0.25 MG tablet Take 1 tablet by mouth nightly 30 tablet 5    omeprazole (PRILOSEC) 40 MG delayed release capsule TAKE ONE (1) CAPSULE BY MOUTH ONCE DAILY 30 capsule 5    sertraline (ZOLOFT) 50 MG tablet TAKE ONE (1) TABLET BY MOUTH  ONCE DAILY 30 tablet 3    atorvastatin (LIPITOR) 40 MG tablet Take 1 tablet by mouth daily 30 tablet 5    ezetimibe (ZETIA) 10 MG tablet Take 1 tablet by mouth daily 30 tablet 6    aspirin 81 MG EC tablet Take 81 mg by mouth daily. No current facility-administered medications for this visit. Allergies: Patient has no known allergies.   Review of Systems:    Constitutional: Negative for diaphoresis and fatigue  Respiratory: Negative for shortness of breath  Cardiovascular: Negative for chest pain, dyspnea on exertion, claudication, edema, irregular heartbeat, murmur, palpitations or shortness of breath  Musculoskeletal: Negative for muscle pain, muscular weakness, negative for pain in arm and leg or swelling in foot and leg    Objective:  /60 (Position: Standing)   Pulse 65   Ht 5' 6\" (1.676 m)   Wt 194 lb 6.4 oz (88.2 kg)   BMI 31.38 kg/m²   Wt Readings from Last 3 Encounters:   02/07/22 194 lb 6.4 oz (88.2 kg)   11/05/21 190 lb 7 oz (86.4 kg)   09/01/21 190 lb (86.2 kg)     Body mass index is 31.38 kg/m². GENERAL - Alert, oriented, pleasant, in no apparent distress. EYES: No jaundice, no conjunctival pallor. Neck - Supple. No jugular venous distention noted. No carotid bruits. Cardiovascular  Normal S1 and S2 without obvious murmur or gallop. Extremities - No cyanosis, clubbing, or significant edema. Pulmonary  No respiratory distress. No wheezes or rales.       MEDICAL DECISION MAKING & DATA REVIEW:    Lab Review   Lab Results   Component Value Date    TROPONINT <0.010 03/14/2019     Lab Results   Component Value Date    PROBNP 632.8 03/14/2019     Lab Results   Component Value Date    INR 1.00 07/05/2019    INR 1.07 03/19/2019     Lab Results   Component Value Date    LABA1C 6.1 05/28/2021    LABA1C 6.3 03/16/2019     Lab Results   Component Value Date    WBC 6.0 05/28/2021    WBC 5.4 07/16/2020    HCT 46.0 05/28/2021    HCT 52.1 07/16/2020    MCV 86.4 05/28/2021    MCV 87.4 07/16/2020     05/28/2021     07/16/2020     Lab Results   Component Value Date    CHOL 122 07/16/2020    CHOL 149 08/21/2019    TRIG 129 07/16/2020    TRIG 145 08/21/2019    HDL 42 05/28/2021    HDL 40 07/16/2020    LDLCALC 44 05/28/2021    LDLCALC 56 07/16/2020    LDLDIRECT 67 02/08/2012    LDLDIRECT 66 02/16/2011     Lab Results   Component Value Date    ALT 31 05/28/2021    ALT 34 07/16/2020    AST 21 05/28/2021    AST 27 07/16/2020     BMP:    Lab Results   Component Value Date     05/28/2021     07/16/2020    K 4.5 05/28/2021    K 4.7 07/16/2020     05/28/2021     07/16/2020    CO2 23 05/28/2021    CO2 23 07/16/2020    BUN 12 05/28/2021    BUN 16 07/16/2020    CREATININE 0.9 05/28/2021    CREATININE 0.8 07/16/2020     CMP:   Lab Results   Component Value Date     05/28/2021     07/16/2020    K 4.5 05/28/2021    K 4.7 07/16/2020     05/28/2021     07/16/2020    CO2 23 05/28/2021    CO2 23 07/16/2020    BUN 12 05/28/2021    BUN 16 07/16/2020    CREATININE 0.9 05/28/2021    CREATININE 0.8 07/16/2020    PROT 7.0 05/28/2021    PROT 7.3 07/16/2020     Lab Results   Component Value Date    TSH 2.3 10/14/2016    TSH 2.4 08/16/2013    TSHHS 3.950 03/14/2019       QUALITY MEASURES REVIEWED:  1.CAD:Patient is taking anti platelet agent:Yes  2. DYSLIPIDEMIA: Patient is on cholesterol lowering medication:Yes   3. Beta-Blocker therapy for CAD, if prior Myocardial Infarction:No   4. Counselled regarding smoking cessation. No   Patient does not Smoke. 5.Anticoagulation therapy (for A.Fib) No   Does Not have A.Fib.  6.Discussed weight management strategies. Assessment & Plan:  Primary / Secondary prevention is the goal by aggressive risk modification, healthy and therapeutic life style changes for cardiovascular risk reduction. CORONARY ARTERY DISEASE:Yes, had CABG with .   clinically stable. Patient is on optimal medical regimen ( see medication list above )  - Patient is currently  asymptomatic from CAD.          - changes in  treatment:   no, on ASA           - Testing ordered: yes  [facility] classification: 1  CARDIOLITE 10/2017    Normal perfusion study with normal distribution in all coronal, short, and    horizontal axis.    The observed defect is consistent with diaphragmatic attenuation.    Normal LV function & wall motion. LVEF is 65 %     EKG: Sinus Bradycardia? ASMI ? Age.     HTN:well controlled on current medical regimen, see list above.              - changes in  treatment:   no,

## 2022-02-07 NOTE — LETTER
[facility]  [address] [id number]  Phone: [phone]  Fax: [phone]    Mee Harding MD    February 7, 2022     Nakia Koenig, [address] [place] [id number]    Patient: Brooks Murrieta   MR Number: [id number]   YOB: 1950   Date of Visit: 2/7/2022       Dear Nakia Koenig:    Thank you for referring Cassidy Melissavictor manuel to me for evaluation/treatment. Below are the relevant portions of my assessment and plan of care. If you have questions, please do not hesitate to call me. I look forward to following Olamide Thomason along with you.     Sincerely,      Mee Harding MD

## 2022-02-07 NOTE — PATIENT INSTRUCTIONS
PATIENT:     I personally reviewed & interpreted, all previously ordered tests as copied above. Latest Labs are pulled in to the note with dates. Labs, specially in Reference to Lipid profile, Cardiac testing in the form of Echo ( dated: ), stress tests ( dated: ) & other relevant cardiac testing reviewed with patient & recommendations made based on assessment of the results. Discussed role of Cardiac risk factors & effects + treatment of co morbidities with patient & advised accordingly. MEDICATIONS: List of medications patient is currently taking is reviewed in detail with the patient & family member present. Discussed any side effects or problems taking the medication. Recommend Continue present management & medications as listed. Prescribe SL NTG. AFFIRMATION: I reviewed patient's history, previous & current medical problems & all Labs + testing. This includes chart prep even prior to the vosit. Various goals are discussed and multiple questions answered. Relevant concelling performed. Office follow up for test results. Please hold on to these instructions the  will call you within 1-9 business days when we receive authorization from your insurance. Nuclear Stress Test    WHAT TO EXPECT:   ? You will need to confirm the test or it could be cancelled. ? This test will take approximately 2 hours: 1 hour in the AM &    1 hour in the PM. You will be given a time by the Technologist after the first part is completed to come back. ? You will be given a medication, through an IV in the hand, this will safely simulate exercise. This IV is also needed to inject the radioactive isotope unless you are able toe walk the treadmill. ? You will receive an injection in the AM & PM before the pictures. ?  Using a special camera, you will have one set of pictures of your heart taken in the AM and a set of pictures in the PM.     PREPARATION FOR TEST:  ? Eat a light breakfast such as water or juice and toast.  ? If you are DIABETIC: Eat a normal breakfast with NO CAFFEINE and take your insulin as normal.   ? AVOID ALL FOODS & DRINKS containing CAFFEINE 12 HOURS PRIOR TO THE TEST: Including coffee, Tea, Gissell and other soft drinks even those labeled  caffeine free or decaffeinated.  ? HOLD THESE MEDICATIONS Persantine & Theophylline (Theodur)  24 hours prior & bring your inhaler with you. Please hold on to these instructions the  will call you within 1-9 business days when we receive authorization from your insurance. Echocardiogram    WHAT TO EXPECT:   ? This test will take approximately 45 minutes. ? It is an ultrasound of the heart. ? It can look at the valves and chambers inside the heart   ? There is no special instructions for this test.     If you are unable to keep this appointment, please notify us 24 hours prior to test at [phone].

## 2022-02-09 ENCOUNTER — PROCEDURE VISIT (OUTPATIENT)
Dept: CARDIOLOGY CLINIC | Age: 72
End: 2022-02-09
Payer: MEDICARE

## 2022-02-09 DIAGNOSIS — R07.9 CHEST PAIN, UNSPECIFIED TYPE: ICD-10-CM

## 2022-02-09 DIAGNOSIS — Z95.1 S/P CABG X 1: ICD-10-CM

## 2022-02-09 DIAGNOSIS — I25.10 CORONARY ARTERY DISEASE INVOLVING NATIVE CORONARY ARTERY OF NATIVE HEART WITHOUT ANGINA PECTORIS: ICD-10-CM

## 2022-02-09 DIAGNOSIS — I10 PRIMARY HYPERTENSION: ICD-10-CM

## 2022-02-09 DIAGNOSIS — Z87.891 FORMER SMOKER: ICD-10-CM

## 2022-02-09 DIAGNOSIS — E78.5 HYPERLIPIDEMIA, UNSPECIFIED HYPERLIPIDEMIA TYPE: ICD-10-CM

## 2022-02-09 DIAGNOSIS — J42 CHRONIC BRONCHITIS, UNSPECIFIED CHRONIC BRONCHITIS TYPE (HCC): ICD-10-CM

## 2022-02-09 LAB
LV EF: 58 %
LVEF MODALITY: NORMAL

## 2022-02-09 PROCEDURE — 93306 TTE W/DOPPLER COMPLETE: CPT | Performed by: INTERNAL MEDICINE

## 2022-02-10 ENCOUNTER — TELEPHONE (OUTPATIENT)
Dept: CARDIOLOGY CLINIC | Age: 72
End: 2022-02-10

## 2022-02-10 NOTE — TELEPHONE ENCOUNTER
Echo:2/9/22  Left ventricular function and size is normal, EF is estimated at 55-60%. Mild left ventricular hypertrophy. Grade I diastolic dysfunction. No regional wall motion abnormalities were detected. Mildly dilated left atrium. Mild mitral and pulmonic regurgitation is present. RVSP is 13 mmHg. No evidence of pericardial effusion    Patient verbally understood.

## 2022-02-16 ENCOUNTER — PROCEDURE VISIT (OUTPATIENT)
Dept: CARDIOLOGY CLINIC | Age: 72
End: 2022-02-16
Payer: MEDICARE

## 2022-02-16 DIAGNOSIS — Z95.1 S/P CABG X 1: ICD-10-CM

## 2022-02-16 DIAGNOSIS — J42 CHRONIC BRONCHITIS, UNSPECIFIED CHRONIC BRONCHITIS TYPE (HCC): ICD-10-CM

## 2022-02-16 DIAGNOSIS — I25.10 CORONARY ARTERY DISEASE INVOLVING NATIVE CORONARY ARTERY OF NATIVE HEART WITHOUT ANGINA PECTORIS: ICD-10-CM

## 2022-02-16 DIAGNOSIS — E78.5 HYPERLIPIDEMIA, UNSPECIFIED HYPERLIPIDEMIA TYPE: ICD-10-CM

## 2022-02-16 DIAGNOSIS — R07.9 CHEST PAIN, UNSPECIFIED TYPE: ICD-10-CM

## 2022-02-16 DIAGNOSIS — Z87.891 FORMER SMOKER: ICD-10-CM

## 2022-02-16 DIAGNOSIS — I10 PRIMARY HYPERTENSION: ICD-10-CM

## 2022-02-16 LAB
LV EF: 58 %
LVEF MODALITY: NORMAL

## 2022-02-16 PROCEDURE — 78452 HT MUSCLE IMAGE SPECT MULT: CPT | Performed by: INTERNAL MEDICINE

## 2022-02-16 PROCEDURE — A9500 TC99M SESTAMIBI: HCPCS | Performed by: INTERNAL MEDICINE

## 2022-02-16 PROCEDURE — 93017 CV STRESS TEST TRACING ONLY: CPT | Performed by: INTERNAL MEDICINE

## 2022-02-16 PROCEDURE — 93016 CV STRESS TEST SUPVJ ONLY: CPT | Performed by: INTERNAL MEDICINE

## 2022-02-16 PROCEDURE — 93018 CV STRESS TEST I&R ONLY: CPT | Performed by: INTERNAL MEDICINE

## 2022-02-18 ENCOUNTER — TELEPHONE (OUTPATIENT)
Dept: CARDIOLOGY CLINIC | Age: 72
End: 2022-02-18

## 2022-02-18 NOTE — TELEPHONE ENCOUNTER
Nm:  Normal study.    Good exercise capacity. 10 METs work load.    Physiological BP response to exercise.    Normal perfusion study with normal distribution in all coronal, short, and    horizontal axis.    The observed defect is consistent with diaphragmatic attenuation.    Normal LV function. LVEF is 58 %. Patient verbally understood.

## 2022-03-02 ENCOUNTER — OFFICE VISIT (OUTPATIENT)
Dept: FAMILY MEDICINE CLINIC | Age: 72
End: 2022-03-02
Payer: MEDICARE

## 2022-03-02 ENCOUNTER — HOSPITAL ENCOUNTER (OUTPATIENT)
Dept: GENERAL RADIOLOGY | Age: 72
Discharge: HOME | End: 2022-03-02
Payer: MEDICARE

## 2022-03-02 ENCOUNTER — HOSPITAL ENCOUNTER (OUTPATIENT)
Age: 72
Discharge: HOME | End: 2022-03-02
Payer: MEDICARE

## 2022-03-02 VITALS
BODY MASS INDEX: 31.38 KG/M2 | HEIGHT: 66 IN | SYSTOLIC BLOOD PRESSURE: 128 MMHG | OXYGEN SATURATION: 93 % | HEART RATE: 53 BPM | DIASTOLIC BLOOD PRESSURE: 72 MMHG

## 2022-03-02 DIAGNOSIS — R07.9 CHEST PAIN, UNSPECIFIED TYPE: ICD-10-CM

## 2022-03-02 DIAGNOSIS — I10 PRIMARY HYPERTENSION: ICD-10-CM

## 2022-03-02 DIAGNOSIS — R07.9 CHEST PAIN, UNSPECIFIED TYPE: Primary | ICD-10-CM

## 2022-03-02 DIAGNOSIS — F41.9 ANXIETY: ICD-10-CM

## 2022-03-02 PROCEDURE — 1036F TOBACCO NON-USER: CPT | Performed by: FAMILY MEDICINE

## 2022-03-02 PROCEDURE — G8484 FLU IMMUNIZE NO ADMIN: HCPCS | Performed by: FAMILY MEDICINE

## 2022-03-02 PROCEDURE — G8417 CALC BMI ABV UP PARAM F/U: HCPCS | Performed by: FAMILY MEDICINE

## 2022-03-02 PROCEDURE — 4040F PNEUMOC VAC/ADMIN/RCVD: CPT | Performed by: FAMILY MEDICINE

## 2022-03-02 PROCEDURE — 99213 OFFICE O/P EST LOW 20 MIN: CPT | Performed by: FAMILY MEDICINE

## 2022-03-02 PROCEDURE — G8427 DOCREV CUR MEDS BY ELIG CLIN: HCPCS | Performed by: FAMILY MEDICINE

## 2022-03-02 PROCEDURE — 71046 X-RAY EXAM CHEST 2 VIEWS: CPT

## 2022-03-02 PROCEDURE — 1123F ACP DISCUSS/DSCN MKR DOCD: CPT | Performed by: FAMILY MEDICINE

## 2022-03-02 PROCEDURE — 3017F COLORECTAL CA SCREEN DOC REV: CPT | Performed by: FAMILY MEDICINE

## 2022-03-02 RX ORDER — PREDNISONE 10 MG/1
10 TABLET ORAL
Qty: 15 TABLET | Refills: 0 | Status: SHIPPED | OUTPATIENT
Start: 2022-03-02 | End: 2022-03-07

## 2022-03-02 ASSESSMENT — ENCOUNTER SYMPTOMS
CHEST TIGHTNESS: 0
EYE PAIN: 0
BLOOD IN STOOL: 0
VOMITING: 0
NAUSEA: 0
DIARRHEA: 0
TROUBLE SWALLOWING: 0
WHEEZING: 0
ABDOMINAL PAIN: 0
SHORTNESS OF BREATH: 0

## 2022-03-02 NOTE — PROGRESS NOTES
3/2/2022    Caryn Drown    Chief Complaint   Patient presents with    3 Month Follow-Up     4 mo    Other     Possibl WC. Refugio Skates Refugio Hobbsates Refugio Hobbsates Pain in chest and back for two months. One month ago Cardiologist found nothing. Possibly moving a roof top air conditioner off of a truck. Never filed WC. HPI  History was obtained from the patient. Miri Samano is a 66 y.o. male who presents today with continued pain in anterior chest and posterior chest.  Patient thinks he injured it when lifting a row of top air-conditioner couple months ago. Has known coronary disease and to be on safe side had a cardiac evaluation recently that was normal.  Still has some discomfort begins at the sternal margin anteriorly and the other end of the pain is in the back. The pain is not exertional more related to position changes and stretching. Anxiety and restless leg symptoms are stable no increase shortness of breath reported. Still working every other day. 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 Refugio Price REVIEW OF SYMPTOMS    Review of Systems   Constitutional: Negative for activity change and fatigue. HENT: Negative for congestion, hearing loss, mouth sores and trouble swallowing. Eyes: Negative for pain and visual disturbance. Respiratory: Negative for chest tightness, shortness of breath and wheezing. Cardiovascular: Negative for chest pain and palpitations. Gastrointestinal: Negative for abdominal pain, blood in stool, diarrhea, nausea and vomiting. Genitourinary: Negative for dysuria, frequency and urgency. Musculoskeletal: Negative for arthralgias, gait problem and neck stiffness. Patient does have pain in the peristomal area with certain movements minimally tender to the touch also the posterior area where her ribs would attach to the vertebral column. Skin: Negative for rash. Allergic/Immunologic: Negative for environmental allergies.    Neurological: Negative for dizziness, seizures, speech difficulty and weakness. Hematological: Does not bruise/bleed easily. Psychiatric/Behavioral: Negative for agitation, confusion, dysphoric mood, hallucinations and suicidal ideas. The patient is nervous/anxious. PAST MEDICAL HISTORY  Past Medical History:   Diagnosis Date    Abnormal CT scan     per pt \"did CT scan and found spot on spine( T 8 lesion) back in March( 2019) did bx but did not show anything and now ( 7/5/2019) going to try do another bx\"    BPH (benign prostatic hyperplasia)     CAD (coronary artery disease)     follows with Dr Humphrey Hunter    Chronic cough 2/18/2020    COPD (chronic obstructive pulmonary disease) ([facility] [address] 4/5/2019    follow with Dr Kimberley Ureña Coronary arteriosclerosis     Excessive daytime sleepiness 4/5/2019    Family history of coronary artery disease     Former smoker 8/17/2020    GERD (gastroesophageal reflux disease)     H/O cardiac catheterization 03-    ([facility]) Total occ. LAD w/mild disease of LM and RCA.     H/O cardiovascular stress test 02-    (Exercise) EF 58%. Normal. Exercise cap. 11.0 METS.    H/O cardiovascular stress test 05-    (Cardiolite) EF 63%. Good exercise capacity. Hypertensive blood pressure response tp exercise. ETT neg for ischemia or arrhythmia. Cardiolite perfusion imaging reveals Ant. wall soft tissue attenuation artifact and mild ischemia of Inf. wall.  H/O colonoscopy with polypectomy 02-    Polyp in Rectum    H/O Doppler ultrasound 03-    (Carotid) No anatomical abnormalities. Normal    H/O echocardiogram 02- 6/14 EF 50-55%  abn LV diast stage 1 2/10EF 50-55%. Abn. LV Diastolic function Stage 1. Left Atrium borderline Dilated.  H/O gastritis     Years Ago    History of cardiac monitoring 11/18/2017    30 day monitoring: normal, Sinus rhythm noted.  no symptoms and no a fib or arrhythmia noted    History of complete ECG 04-    [place] (hard of hearing[place]     noé hearing aides    HTN (hypertension)     Hx of cardiovascular stress test 10/28/2015    cardiolite-mild ischemia apical,EF64%    Hx of echocardiogram 2016    EF 55-60%. LA is mildly dilated. RV is mildly dilated. Mild MR. Mild tricuspid insufficiency. Significant VHD is absent.  Hyperlipidemia     Narcolepsy     Nocturnal hypoxemia 2019    Obstructive sleep apnea 2019    \"had sleep study done and they said I do not have sleep apnea, put me on oxygen at night \"    On home oxygen therapy     2l/nc at night only    Periodic limb movements of sleep 2019    Polyp of colon     Reflux     RLS (restless legs syndrome)     S/P CABG x 1 2002    Stricture of esophagus     S/P Dilation (Dr. Merry Penn)    UTI (urinary tract infection) 2019    Wears glasses        FAMILY HISTORY  Family History   Problem Relation Age of Onset    Heart Disease Mother         Pacemaker, MVR, CABG    Stroke Mother     Cancer Father         lung ca- smoker       SOCIAL HISTORY  Social History     Socioeconomic History    Marital status:      Spouse name: Not on file    Number of children: 2    Years of education: Not on file    Highest education level: Not on file   Occupational History    Not on file   Tobacco Use    Smoking status: Former Smoker     Packs/day: 0.00     Years: 10.00     Pack years: 0.00     Types: Pipe     Quit date: 1985     Years since quittin.1    Smokeless tobacco: Never Used   Vaping Use    Vaping Use: Never used   Substance and Sexual Activity    Alcohol use:  Yes     Alcohol/week: 0.0 standard drinks     Comment: occ alcohol/ caffeine 2 cups of coffee a day    Drug use: No    Sexual activity: Not on file   Other Topics Concern    Not on file   Social History Narrative    Not on file     Social Determinants of Health     Financial Resource Strain: Low Risk     Difficulty of Paying Living Expenses: Not hard at all   Food Insecurity: No Food Insecurity    Worried About Running Out of Food in the Last Year: Never true    Ran Out of Food in the Last Year: Never true   Transportation Needs:     Lack of Transportation (Medical): Not on file    Lack of Transportation (Non-Medical):  Not on file   Physical Activity:     Days of Exercise per Week: Not on file    Minutes of Exercise per Session: Not on file   Stress:     Feeling of Stress : Not on file   Social Connections:     Frequency of Communication with Friends and Family: Not on file    Frequency of Social Gatherings with Friends and Family: Not on file    Attends [employer] Services: Not on file    Active Member of [address] [place] [address] [employer] or Organizations: Not on file    Attends Club or Organization Meetings: Not on file    Marital Status: Not on file   Intimate Partner Violence:     Fear of Current or Ex-Partner: Not on file    Emotionally Abused: Not on file    Physically Abused: Not on file    Sexually Abused: Not on file   Housing Stability:     Unable to Pay for Housing in the Last Year: Not on file    Number of [place] in the Last Year: Not on file    Unstable Housing in the Last Year: Not on file        SURGICAL HISTORY  Past Surgical History:   Procedure Laterality Date    BONE BIOPSY      per old chart bx T 8 3/2019    BONE BIOPSY N/A 07/05/2019    IR T-8    COLONOSCOPY  last one 2016 2008    COLONOSCOPY N/A 7/29/2021    COLONOSCOPY POLYPECTOMY SNARE/COLD BIOPSY performed by Shon Hernandez MD at [place] [address]  03-    x 1 - [place] to LAD - Dr. Whit Mosquera Right 2019   Jalil Phlegm  as a kid    VASECTOMY  1976                 CURRENT MEDICATIONS  Current Outpatient Medications   Medication Sig Dispense Refill    Multiple Vitamin (MULTIVITAMIN ADULT PO) Take by mouth      predniSONE (DELTASONE) 10 MG tablet Take 1 tablet by mouth 3 times daily (with meals) for 5 days 15 tablet 0    nitroGLYCERIN (NITROSTAT) 0.4 MG SL tablet Place 1 tablet under the tongue every 5 minutes as needed for Chest pain up to max of 3 total doses. If no relief after 1 dose, call 911. 25 tablet 3    amLODIPine (NORVASC) 5 MG tablet Take 1 tablet by mouth daily 30 tablet 5    pramipexole (MIRAPEX) 0.25 MG tablet Take 1 tablet by mouth nightly 30 tablet 5    omeprazole (PRILOSEC) 40 MG delayed release capsule TAKE ONE (1) CAPSULE BY MOUTH ONCE DAILY 30 capsule 5    sertraline (ZOLOFT) 50 MG tablet TAKE ONE (1) TABLET BY MOUTH  ONCE DAILY 30 tablet 3    atorvastatin (LIPITOR) 40 MG tablet Take 1 tablet by mouth daily 30 tablet 5    ezetimibe (ZETIA) 10 MG tablet Take 1 tablet by mouth daily 30 tablet 6    aspirin 81 MG EC tablet Take 81 mg by mouth daily. No current facility-administered medications for this visit. ALLERGIES  No Known Allergies    PHYSICAL EXAM    /72 (Site: Right Upper Arm, Position: Sitting, Cuff Size: Medium Adult)   Pulse 53   Ht 5' 6\" (1.676 m)   SpO2 93%   BMI 31.38 kg/m²     Physical Exam  Vitals and nursing note reviewed. Constitutional:       General: He is in acute distress. Appearance: He is well-developed. He is not ill-appearing, toxic-appearing or diaphoretic. HENT:      Head: Normocephalic and atraumatic. Nose: Nose normal.      Mouth/Throat:      Mouth: Mucous membranes are moist.      Pharynx: Oropharynx is clear. Eyes:      Pupils: Pupils are equal, round, and reactive to light. Cardiovascular:      Rate and Rhythm: Normal rate and regular rhythm. Heart sounds: Normal heart sounds. No murmur heard. Pulmonary:      Effort: Pulmonary effort is normal. No respiratory distress. Breath sounds: Normal breath sounds. No wheezing or rales. Abdominal:      Palpations: Abdomen is soft. Musculoskeletal:         General: No swelling or deformity. Normal range of motion. Cervical back: Normal range of motion and neck supple. No rigidity. Right lower leg: No edema.       Comments: Mild tenderness right parasternal area. Also pain in this area with certain arm movements deep breathing and position changes does seem to also affect the other end of that affected rib in the vertebral area. Lymphadenopathy:      Cervical: No cervical adenopathy. Skin:     General: Skin is warm and dry. Neurological:      General: No focal deficit present. Mental Status: He is alert and oriented to person, place, and time. Mental status is at baseline. Cranial Nerves: No cranial nerve deficit. Motor: No weakness. Gait: Gait normal.   Psychiatric:         Mood and Affect: Mood normal.         Behavior: Behavior normal.         Thought Content: Thought content normal.         ASSESSMENT & PLAN     Diagnosis Orders   1. Chest pain, unspecified type(costochondritis)  XR CHEST (2 VW)   2. Primary hypertension     3. Anxiety     I suspect this is a traumatic costochondritis as a cause for his chest pain. He is to apply heat on low and get a chest x-ray PA and lateral at imaging center. We will treat him with prednisone 10 mg 3 times daily for 5 days -see if this combination will help with improve this pain. Stay on usual regimen otherwise. Return in about 3 months (around 6/2/2022), or if symptoms worsen or fail to improve.          Electronically signed by Melodie Stockton MD on 3/2/2022

## 2022-03-07 ENCOUNTER — TELEPHONE (OUTPATIENT)
Dept: FAMILY MEDICINE CLINIC | Age: 72
End: 2022-03-07

## 2022-04-27 RX ORDER — EZETIMIBE 10 MG/1
10 TABLET ORAL DAILY
Qty: 30 TABLET | Refills: 5 | Status: SHIPPED | OUTPATIENT
Start: 2022-04-27 | End: 2022-10-26

## 2022-05-09 NOTE — PROGRESS NOTES
Patient Name: Jelly Flores  Patient : 1950  Patient MRN: [id number]     Primary Oncologist: Pasquale Parker MD  Referring Provider: David Holloway MD     Date of Service: 2022      Chief Complaint:   Chief Complaint   Patient presents with    Follow-up     He came in for follow-up visit. Patient Active Problem List:     CAD (coronary artery disease)     Hyperlipidemia     GERD (gastroesophageal reflux disease)     S/P CABG x 1     HTN (hypertension)     Family history of coronary artery disease     Iron deficiency anemia due to chronic blood loss     Acute respiratory failure with hypoxia ([address]     Community acquired pneumonia     Excessive daytime sleepiness     COPD (chronic obstructive pulmonary disease) (HCC)     Periodic limb movements of sleep     Tumor T8 (hyaline cartilage)     Anxiety     Narcolepsy     Coronary arteriosclerosis     Chronic cough     Abnormal findings on diagnostic imaging of musculoskeletal system     Restless leg syndrome     Former smoker     HPI:   Sixto Lopez is a pleasant 69 y.o.  male patient with significant past medical history of BPH, HTN who was hospitalized in 2019 due to fever. He was diagnosed with BPH/UTI. He was found to have elevated elevated PSA and has been followed by urologist.  CT chest:  1. No acute pulmonary artery embolism. 2. Bibasilar atelectasis. 3. Lytic destructive appearance of the T8 vertebral body. This is considered metastatic until proven otherwise. Recommend whole body bone scan to assess for metabolic activity and potential metachronous lesion. He has history of fall off roof more than 40 years ago. Bone scan : Amorphous radiotracer accumulation associated with the posterior elements of T8 corresponding to the expansile lytic destructive lesion seen on recent CT. No additional foci of abnormal radiotracer accumulation throughout the skeleton. He had bone biopsy on 2019.   Pathology report revealed:  Bone, T8 vertebra, lytic lesion; CT guided needle biopsy: - Small fragments of cartilage, bone, skeletal muscle and soft tissue showing no significant histopathologic change. Flow cytometry study was not significant. SPEP, serum light chain study were unremarkable. He will follow-up with urologist as an outpatient. I plan to have follow-up imaging study in 3 months. He is currently asymptomatic. MRI of T-spine in June 2019 : non specific abnormal marrow signal of T-8. After discussing with patient and radiologist, patient is agreeable to have repeat biopsy of T-8 from the pedicles or vertebral body. On July 17, 2019 he came in for follow up visit. He had colonoscopy in July 2019. Repeat in 2021. 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 Awaiting for official report from [facility]. For the time being I referred to spine surgeon at [facility]. He was seen by Dr Leno Valdovinos, sarcoma specialist at [facility] who recommended closed surveillance. 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 CT cervical, thoracic and lumbar spine in August 2019 : expansile, mixed lytic and sclerotic lesion of T8 involving the posterior element and R pedicle and R posterior and central aspect of vertebral body, consistent with chondroid type tumor. Reportedly PSA has improved and he is followed by Dr Yvon Estrada. CT and MRI of thoracic spine in November 2019 were reviewed, grossly stable. MRI of thoracic spine in April 2020 showed stable expansile chondroid type lesion within the posterior element of T8.   CT and MRI of the thoracic spine in November 2020 were reviewed and stable. He will follow-up with Marcell Stewart in 6 months. MRI of the spine on May 11, 2021 showed : unchanged expansile lesion within the posterior T8 vertebral body and posterior elements.   Follow-up in 1 year was recommended by the neurosurgeon at [facility]. He has Covid vaccine. On 6/7/2022 he came in for follow up visit. MRI of thoracic spine with and without contrast in 4/2022  1.  Stable appearance of the dominant, expansile lesion within the T8 posterior elements slightly involving the posterior aspect of the vertebral body. 2.  Degree of encroachment on adjacent spinal canal and neural foramen is unchanged. RTC in 2 years with MRI T spine with and without contrast as per neurosurgeon. Colonoscopy 7/2021   Final Pathologic Diagnosis:   Colon, hepatic flexure, polypectomy:   -  Tubular adenoma. Likely he will have follow-up colonoscopy in 5 years. Currently he is asymptomatic. I concur with the recommendation of the neurosurgeon from [facility]. If he has increasing pain to his back I recommend to follow-up with me sooner. No acute pain. Denied any nausea, vomiting or diarrhea. No fever or chills. No chest pain, shortness of breath or palpitation. No headache or dizzy spell. No specific bone pain. No melena or hematochezia. Denied any dysuria or hematuria. Past medical history:  Coronary artery disease, BPH, dyslipidemia, hypertension, acid reflux. Past surgical history:  Colonoscopy with removal of polyp from rectum in February 2008. CABG in March 2002, tonsillectomy, vasectomy in 1976. Biopsy of the T-8 in March 2019. Social history:  He quit smoking more than 34 years ago. He denies any alcohol or illicit drug use. He has 2 biological children and one stepdaughter. Family history:  Father had lung cancer. Review of Systems: \"Per interval history; otherwise 10 point ROS is negative. \"     Vital Signs:  BP (!) 150/67 (Site: Left Upper Arm, Position: Sitting, Cuff Size: Large Adult)   Pulse 61   Temp 97.7 °F (36.5 °C) (Infrared)   Resp 16   Ht 5' 6\" (1.676 m)   Wt 186 lb (84.4 kg)   SpO2 94%   BMI 30.02 kg/m²     Physical Exam:  CONSTITUTIONAL: awake, alert, cooperative, no apparent distress EYES: pupils equal, round and reactive to light, sclera clear and conjunctiva normal  ENT: Normocephalic, without obvious abnormality, atraumatic  NECK: supple, symmetrical, no jugular venous distension and no carotid bruits   HEMATOLOGIC/LYMPHATIC: no cervical, supraclavicular or axillary lymphadenopathy   LUNGS: no increased work of breathing and clear to auscultation   CARDIOVASCULAR: regular rate and rhythm, normal S1 and S2, no murmur   ABDOMEN: normal bowel sounds, soft, non-distended, non-tender, no masses palpated, no hepatosplenomegaly   MUSCULOSKELETAL: full range of motion noted, tone is normal  NEUROLOGIC: Motor skills grossly intact. CN II - XII grossly intact. SKIN: No jaundice. appears intact. No petechial rash. EXTREMITIES: no LE edema or cyanosis.     Labs:  Hematology:  Lab Results   Component Value Date    WBC 6.0 05/28/2021    RBC 5.32 05/28/2021    HGB 16.0 05/28/2021    HCT 46.0 05/28/2021    MCV 86.4 05/28/2021    MCH 30.0 05/28/2021    MCHC 34.7 05/28/2021    RDW 13.2 05/28/2021     05/28/2021    MPV 9.5 05/28/2021    BANDSPCT 15 (H) 03/15/2019    SEGSPCT 73.8 (H) 03/16/2019    EOSRELPCT 5.5 05/28/2021    BASOPCT 0.7 05/28/2021    LYMPHOPCT 29.2 05/28/2021    MONOPCT 11.4 05/28/2021    BANDABS 2.36 03/15/2019    SEGSABS 5.7 03/16/2019    EOSABS 0.3 05/28/2021    BASOSABS 0.0 05/28/2021    LYMPHSABS 1.7 05/28/2021    MONOSABS 0.7 05/28/2021    DIFFTYPE AUTOMATED DIFFERENTIAL 03/16/2019    ANISOCYTOSIS 1+ 03/15/2019    WBCMORP OCCASIONAL  OCCASIONAL VACULATED MONOCYTES   03/15/2019     Chemistry:  Lab Results   Component Value Date     05/28/2021    K 4.5 05/28/2021     05/28/2021    CO2 23 05/28/2021    BUN 12 05/28/2021    CREATININE 0.9 05/28/2021    GLUCOSE 91 05/28/2021    CALCIUM 9.3 05/28/2021    PROT 7.0 05/28/2021    LABALBU 4.3 05/28/2021    BILITOT 0.6 05/28/2021    ALKPHOS 73 05/28/2021    AST 21 05/28/2021    ALT 31 05/28/2021    LABGLOM >60 05/28/2021 dermatologist on July 23, 2019. We discussed about healthy diet and lifestyle. He had Covid vaccine. Return to clinic in 24 months or sooner. All of his question has been answered for today. Recent imaging and labs were reviewed and discussed with the patient.

## 2022-06-01 RX ORDER — OMEPRAZOLE 40 MG/1
CAPSULE, DELAYED RELEASE ORAL
Qty: 30 CAPSULE | Refills: 5 | Status: SHIPPED | OUTPATIENT
Start: 2022-06-01 | End: 2022-10-27 | Stop reason: SDUPTHER

## 2022-06-07 ENCOUNTER — OFFICE VISIT (OUTPATIENT)
Dept: PULMONOLOGY | Age: 72
End: 2022-06-07
Payer: MEDICARE

## 2022-06-07 ENCOUNTER — HOSPITAL ENCOUNTER (OUTPATIENT)
Dept: INFUSION THERAPY | Age: 72
Discharge: HOME | End: 2022-06-07

## 2022-06-07 ENCOUNTER — OFFICE VISIT (OUTPATIENT)
Dept: ONCOLOGY | Age: 72
End: 2022-06-07
Payer: MEDICARE

## 2022-06-07 VITALS
BODY MASS INDEX: 29.89 KG/M2 | HEART RATE: 61 BPM | RESPIRATION RATE: 16 BRPM | SYSTOLIC BLOOD PRESSURE: 150 MMHG | HEIGHT: 66 IN | OXYGEN SATURATION: 94 % | WEIGHT: 186 LBS | DIASTOLIC BLOOD PRESSURE: 67 MMHG | TEMPERATURE: 97.7 F

## 2022-06-07 VITALS
DIASTOLIC BLOOD PRESSURE: 70 MMHG | HEART RATE: 59 BPM | HEIGHT: 66 IN | BODY MASS INDEX: 29.57 KG/M2 | OXYGEN SATURATION: 96 % | SYSTOLIC BLOOD PRESSURE: 122 MMHG | WEIGHT: 184 LBS

## 2022-06-07 DIAGNOSIS — R05.3 CHRONIC COUGH: ICD-10-CM

## 2022-06-07 DIAGNOSIS — J41.0 SIMPLE CHRONIC BRONCHITIS (HCC): Primary | ICD-10-CM

## 2022-06-07 DIAGNOSIS — R93.7 ABNORMAL FINDINGS ON DIAGNOSTIC IMAGING OF MUSCULOSKELETAL SYSTEM: Primary | ICD-10-CM

## 2022-06-07 DIAGNOSIS — Z87.891 FORMER SMOKER: ICD-10-CM

## 2022-06-07 PROCEDURE — G8417 CALC BMI ABV UP PARAM F/U: HCPCS | Performed by: INTERNAL MEDICINE

## 2022-06-07 PROCEDURE — 3023F SPIROM DOC REV: CPT | Performed by: INTERNAL MEDICINE

## 2022-06-07 PROCEDURE — 1123F ACP DISCUSS/DSCN MKR DOCD: CPT | Performed by: INTERNAL MEDICINE

## 2022-06-07 PROCEDURE — 1036F TOBACCO NON-USER: CPT | Performed by: INTERNAL MEDICINE

## 2022-06-07 PROCEDURE — 99213 OFFICE O/P EST LOW 20 MIN: CPT | Performed by: INTERNAL MEDICINE

## 2022-06-07 PROCEDURE — 3017F COLORECTAL CA SCREEN DOC REV: CPT | Performed by: INTERNAL MEDICINE

## 2022-06-07 PROCEDURE — G8427 DOCREV CUR MEDS BY ELIG CLIN: HCPCS | Performed by: INTERNAL MEDICINE

## 2022-06-07 ASSESSMENT — PATIENT HEALTH QUESTIONNAIRE - PHQ9
SUM OF ALL RESPONSES TO PHQ QUESTIONS 1-9: 0
2. FEELING DOWN, DEPRESSED OR HOPELESS: 0
SUM OF ALL RESPONSES TO PHQ QUESTIONS 1-9: 0
SUM OF ALL RESPONSES TO PHQ QUESTIONS 1-9: 0
1. LITTLE INTEREST OR PLEASURE IN DOING THINGS: 0
SUM OF ALL RESPONSES TO PHQ QUESTIONS 1-9: 0
SUM OF ALL RESPONSES TO PHQ9 QUESTIONS 1 & 2: 0

## 2022-06-07 NOTE — PROGRESS NOTES
SUBJECTIVE:  Chief Complaint: Simple chronic bronchitis, chronic cough, former smoker  Jose Urbina states that he has had no recent bronchitic infections and denies worsening shortness of breath or chest discomfort. He has been seen for chest pain since I last saw him in did have a work-up in March 2022 which included a chest x-ray. He still has a mild cough which is mostly nonproductive. It does not keep him awake at night. He is not on bronchodilator therapy. He has had no known COVID-19 exposure or infection and has received all 3 Pfizer COVID-19 vaccinations. ROS:  Constitution:  HEENT: Negative for ear, throat pain  Cardiovascular: Negative for chest pain, syncope, edema  Pulmonary: See HPI  Musculoskeletal: Negative for DVT, myalgias, arthralgias    OBJECTIVE:  /70   Pulse 59   Ht 5' 6\" (1.676 m)   Wt 184 lb (83.5 kg)   SpO2 96%   BMI 29.70 kg/m²      Physical Exam:  Constitutional:  He appears well developed and well-nourished. In no respiratory distress at rest.  No coughing noted during exam  Neck:  Supple, No palpable lymphadenopathy, No JVD  Cardiovascular:  S1, S2 Normal, Regular rhythm, no murmurs or gallops, No pericardial  rubs. Pulmonary: Breath sounds are clear throughout all areas without wheezing or rhonchi. He did not cough after forced expiration  Abdomen: Not examined  Extremities: no edema, No DVT  Neurologic: Oriented x3, No focal deficits    Radiology: Chest x-ray on 3/2/2022 showed no acute cardiopulmonary findings  PFT: Office spirometry on 12/7/2021 demonstrated no significant airways obstruction with a mild response to bronchodilators in the small airways      Echocardiogram: 2/9/2022  Left ventricular function and size is normal, EF is estimated at 55-60%. Mild left ventricular hypertrophy. Grade I diastolic dysfunction. No regional wall motion abnormalities were detected. Mildly dilated left atrium. Mild mitral and pulmonic regurgitation is present.    RVSP is 13 mmHg. No evidence of pericardial effusion  ASSESSMENT:    1. Simple chronic bronchitis ([address]    2. Chronic cough    3. Former smoker          PLAN:   I suspect he may have a small component of reactive airway disease/bronchospasm causing his cough. He does not have a strong history of breakthrough reflux and is on a PPI for GERD. I do not think he would benefit from bronchodilator therapy since his cough is not that significant. I will return him to Dr. Karli ortiz. If further pulmonary consultation is required he can be referred back to [facility] pulmonary at a later date. We have discussed the need to maintain yearly flu immunization, pneumococcal vaccination. We have discussed Coronavirus precaution including handwashing practice, wiping items touched in public such as gas pumps, door handles, shopping carts, etc. Self monitoring for infection - fever, chills, cough, SOB. Should they develop symptoms they should call office for further instructions. No follow-ups on file. This dictation was performed with a verbal recognition program and it was checked for errors. It is possible that there are still dictated errors within this office note. Any errors should be brought immediately to my attention for correction. All efforts were made to ensure that this office note is accurate.

## 2022-06-07 NOTE — PROGRESS NOTES
MA Rooming Questions  Patient: Shelby Gonsalves  MRN: [id number]    Date: 6/7/2022        1. Do you have any new issues?   no         2. Do you need any refills on medications?    no    3. Have you had any imaging done since your last visit?   no    4. Have you been hospitalized or seen in the emergency room since your last visit here?   no    5. Did the patient have a depression screening completed today?  Yes    PHQ-9 Total Score: 0 (6/7/2022  9:03 AM)       PHQ-9 Given to (if applicable):               PHQ-9 Score (if applicable):                     [] Positive     [x]  Negative              Does question #9 need addressed (if applicable)                     [] Yes    []  No               Christiana Cerna MA

## 2022-06-15 RX ORDER — AMLODIPINE BESYLATE 5 MG/1
TABLET ORAL
Qty: 30 TABLET | Refills: 5 | Status: SHIPPED | OUTPATIENT
Start: 2022-06-15

## 2022-06-30 ENCOUNTER — OFFICE VISIT (OUTPATIENT)
Dept: FAMILY MEDICINE CLINIC | Age: 72
End: 2022-06-30
Payer: MEDICARE

## 2022-06-30 VITALS
BODY MASS INDEX: 30.04 KG/M2 | RESPIRATION RATE: 24 BRPM | OXYGEN SATURATION: 95 % | HEIGHT: 66 IN | WEIGHT: 186.9 LBS | SYSTOLIC BLOOD PRESSURE: 122 MMHG | DIASTOLIC BLOOD PRESSURE: 64 MMHG | HEART RATE: 59 BPM

## 2022-06-30 DIAGNOSIS — R73.9 HYPERGLYCEMIA: ICD-10-CM

## 2022-06-30 DIAGNOSIS — Z12.5 PROSTATE CANCER SCREENING: ICD-10-CM

## 2022-06-30 DIAGNOSIS — I10 PRIMARY HYPERTENSION: ICD-10-CM

## 2022-06-30 DIAGNOSIS — K21.9 GASTROESOPHAGEAL REFLUX DISEASE WITHOUT ESOPHAGITIS: ICD-10-CM

## 2022-06-30 DIAGNOSIS — I25.10 CORONARY ARTERY DISEASE INVOLVING NATIVE CORONARY ARTERY OF NATIVE HEART WITHOUT ANGINA PECTORIS: ICD-10-CM

## 2022-06-30 DIAGNOSIS — J41.0 SIMPLE CHRONIC BRONCHITIS (HCC): ICD-10-CM

## 2022-06-30 DIAGNOSIS — F41.9 ANXIETY: Primary | ICD-10-CM

## 2022-06-30 LAB
A/G RATIO: 1.6 (ref 1.1–2.2)
ALBUMIN SERPL-MCNC: 4.4 G/DL (ref 3.4–5)
ALP BLD-CCNC: 69 U/L (ref 40–129)
ALT SERPL-CCNC: 36 U/L (ref 10–40)
ANION GAP SERPL CALCULATED.3IONS-SCNC: 12 MMOL/L (ref 3–16)
AST SERPL-CCNC: 23 U/L (ref 15–37)
BILIRUB SERPL-MCNC: 0.8 MG/DL (ref 0–1)
BUN BLDV-MCNC: 17 MG/DL (ref 7–20)
CALCIUM SERPL-MCNC: 9.6 MG/DL (ref 8.3–10.6)
CHLORIDE BLD-SCNC: 105 MMOL/L (ref 99–110)
CHOLESTEROL, TOTAL: 118 MG/DL (ref 0–199)
CO2: 23 MMOL/L (ref 21–32)
CREAT SERPL-MCNC: 0.8 MG/DL (ref 0.8–1.3)
GFR AFRICAN AMERICAN: >60
GFR NON-AFRICAN AMERICAN: >60
GLUCOSE BLD-MCNC: 110 MG/DL (ref 70–99)
HCT VFR BLD CALC: 46.1 % (ref 40.5–52.5)
HDLC SERPL-MCNC: 47 MG/DL (ref 40–60)
HEMOGLOBIN: 15.8 G/DL (ref 13.5–17.5)
LDL CHOLESTEROL CALCULATED: 54 MG/DL
MCH RBC QN AUTO: 29.8 PG (ref 26–34)
MCHC RBC AUTO-ENTMCNC: 34.2 G/DL (ref 31–36)
MCV RBC AUTO: 87.1 FL (ref 80–100)
PDW BLD-RTO: 13.4 % (ref 12.4–15.4)
PLATELET # BLD: 169 K/UL (ref 135–450)
PMV BLD AUTO: 9.3 FL (ref 5–10.5)
POTASSIUM SERPL-SCNC: 4.9 MMOL/L (ref 3.5–5.1)
PROSTATE SPECIFIC ANTIGEN: 0.74 NG/ML (ref 0–4)
RBC # BLD: 5.3 M/UL (ref 4.2–5.9)
SODIUM BLD-SCNC: 140 MMOL/L (ref 136–145)
TOTAL PROTEIN: 7.2 G/DL (ref 6.4–8.2)
TRIGL SERPL-MCNC: 85 MG/DL (ref 0–150)
VLDLC SERPL CALC-MCNC: 17 MG/DL
WBC # BLD: 4.9 K/UL (ref 4–11)

## 2022-06-30 PROCEDURE — 3017F COLORECTAL CA SCREEN DOC REV: CPT | Performed by: FAMILY MEDICINE

## 2022-06-30 PROCEDURE — G8427 DOCREV CUR MEDS BY ELIG CLIN: HCPCS | Performed by: FAMILY MEDICINE

## 2022-06-30 PROCEDURE — 1123F ACP DISCUSS/DSCN MKR DOCD: CPT | Performed by: FAMILY MEDICINE

## 2022-06-30 PROCEDURE — 1036F TOBACCO NON-USER: CPT | Performed by: FAMILY MEDICINE

## 2022-06-30 PROCEDURE — 99214 OFFICE O/P EST MOD 30 MIN: CPT | Performed by: FAMILY MEDICINE

## 2022-06-30 PROCEDURE — G8417 CALC BMI ABV UP PARAM F/U: HCPCS | Performed by: FAMILY MEDICINE

## 2022-06-30 PROCEDURE — 3023F SPIROM DOC REV: CPT | Performed by: FAMILY MEDICINE

## 2022-06-30 ASSESSMENT — ENCOUNTER SYMPTOMS
DIARRHEA: 0
NAUSEA: 0
BLOOD IN STOOL: 0
CHEST TIGHTNESS: 0
ABDOMINAL PAIN: 0
VOMITING: 0
WHEEZING: 0
SHORTNESS OF BREATH: 0
EYE PAIN: 0
TROUBLE SWALLOWING: 0

## 2022-06-30 NOTE — PROGRESS NOTES
6/30/2022    Elke Castillo    Chief Complaint   Patient presents with    3 Month Follow-Up     4 mo    Other     When he has a bowel movement he leaks. Has to go back and wipe again. HPI  History was obtained from the patient. Wilfredo Vidal is a 66 y.o. male who presents today with routine follow-up. Wilfredo Vidal is cut back even more during the summer in terms of work schedule. No chest pain no shortness of breath reported. Does remain active no increase exercise and restless leg symptoms are stable having little bit of rectal irritation I suggest he be sure to keep his stools regular and loose and consider using Tucks pads or need some preparation that has which bonita in it after bowel movements to help with his soilage issue. Otherwise he has been doing fine patient on review does need to get COVID booster later this summer and will need some labs drawn. Has been good about following up with his subspecialist.. REVIEW OF SYMPTOMS    Review of Systems   Constitutional: Negative for activity change and fatigue. HENT: Negative for congestion, hearing loss, mouth sores and trouble swallowing. Eyes: Negative for pain and visual disturbance. Respiratory: Negative for chest tightness, shortness of breath and wheezing. Patient with COPD currently doing well. Cardiovascular: Negative for chest pain and palpitations. Patient with coronary disease and previous bypass grafting. No chest pain or shortness of breath currently. Gastrointestinal: Negative for abdominal pain, blood in stool, diarrhea, nausea and vomiting. Endocrine: Negative for cold intolerance, polydipsia and polyuria. Genitourinary: Negative for dysuria, frequency and urgency. Musculoskeletal: Negative for arthralgias, gait problem and neck stiffness. Skin: Negative for rash. Allergic/Immunologic: Negative for environmental allergies. Neurological: Negative for dizziness, seizures, speech difficulty and weakness. Hematological: Does not bruise/bleed easily. Psychiatric/Behavioral: Negative for agitation, confusion, hallucinations and suicidal ideas. The patient is nervous/anxious. PAST MEDICAL HISTORY  Past Medical History:   Diagnosis Date    Abnormal CT scan     per pt \"did CT scan and found spot on spine( T 8 lesion) back in March( 2019) did bx but did not show anything and now ( 7/5/2019) going to try do another bx\"    BPH (benign prostatic hyperplasia)     CAD (coronary artery disease)     follows with Dr Akin Avina    Chronic cough 2/18/2020    COPD (chronic obstructive pulmonary disease) ([facility] [address] 4/5/2019    follow with Dr Chuck Carr Coronary arteriosclerosis     Excessive daytime sleepiness 4/5/2019    Family history of coronary artery disease     Former smoker 8/17/2020    GERD (gastroesophageal reflux disease)     H/O cardiac catheterization 03-    ([facility]) Total occ. LAD w/mild disease of LM and RCA.     H/O cardiovascular stress test 02-    (Exercise) EF 58%. Normal. Exercise cap. 11.0 METS.    H/O cardiovascular stress test 05-    (Cardiolite) EF 63%. Good exercise capacity. Hypertensive blood pressure response tp exercise. ETT neg for ischemia or arrhythmia. Cardiolite perfusion imaging reveals Ant. wall soft tissue attenuation artifact and mild ischemia of Inf. wall.  H/O colonoscopy with polypectomy 02-    Polyp in Rectum    H/O Doppler ultrasound 03-    (Carotid) No anatomical abnormalities. Normal    H/O echocardiogram 02- 6/14 EF 50-55%  abn LV diast stage 1 2/10EF 50-55%. Abn. LV Diastolic function Stage 1. Left Atrium borderline Dilated.  H/O gastritis     Years Ago    History of cardiac monitoring 11/18/2017    30 day monitoring: normal, Sinus rhythm noted.  no symptoms and no a fib or arrhythmia noted    History of complete ECG 04-    [place] (hard of hearing)     noé hearing aides    HTN (hypertension)     Hx of cardiovascular true    Mathew [employer] in the Last Year: Never true   Transportation Needs:     Lack of Transportation (Medical): Not on file    Lack of Transportation (Non-Medical):  Not on file   Physical Activity:     Days of Exercise per Week: Not on file    Minutes of Exercise per Session: Not on file   Stress:     Feeling of Stress : Not on file   Social Connections:     Frequency of Communication with Friends and Family: Not on file    Frequency of Social Gatherings with Friends and Family: Not on file    Attends Jehovah's witness Services: Not on file    Active Member of [address] [place] [address] [employer] or Organizations: Not on file    Attends Club or Organization Meetings: Not on file    Marital Status: Not on file   Intimate Partner Violence:     Fear of Current or Ex-Partner: Not on file    Emotionally Abused: Not on file    Physically Abused: Not on file    Sexually Abused: Not on file   Housing Stability:     Unable to Pay for Housing in the Last Year: Not on file    Number of [place] in the Last Year: Not on file    Unstable Housing in the Last Year: Not on file        SURGICAL HISTORY  Past Surgical History:   Procedure Laterality Date    BONE BIOPSY      per old chart bx T 8 3/2019    BONE BIOPSY N/A 07/05/2019    IR T-8    COLONOSCOPY  last one 2016 2008    COLONOSCOPY N/A 7/29/2021    COLONOSCOPY POLYPECTOMY SNARE/COLD BIOPSY performed by Mili Thomas MD at [place] [address]  03-    x 1 - [place] to LAD - Dr. Ariana Rivera Right 2019    TONSILLECTOMY  as a kid    VASECTOMY  1976                 CURRENT MEDICATIONS  Current Outpatient Medications   Medication Sig Dispense Refill    amLODIPine (NORVASC) 5 MG tablet TAKE ONE (1) TABLET BY MOUTH ONCE DAILY 30 tablet 5    omeprazole (PRILOSEC) 40 MG delayed release capsule TAKE ONE (1) CAPSULE BY MOUTH ONCE DAILY 30 capsule 5    ezetimibe (ZETIA) 10 MG tablet Take 1 tablet by mouth daily 30 tablet 5    Multiple Vitamin (MULTIVITAMIN ADULT PO) Take by mouth      nitroGLYCERIN (NITROSTAT) 0.4 MG SL tablet Place 1 tablet under the tongue every 5 minutes as needed for Chest pain up to max of 3 total doses. If no relief after 1 dose, call 911. 25 tablet 3    pramipexole (MIRAPEX) 0.25 MG tablet Take 1 tablet by mouth nightly 30 tablet 5    sertraline (ZOLOFT) 50 MG tablet TAKE ONE (1) TABLET BY MOUTH  ONCE DAILY 30 tablet 3    atorvastatin (LIPITOR) 40 MG tablet Take 1 tablet by mouth daily 30 tablet 5    aspirin 81 MG EC tablet Take 81 mg by mouth daily. No current facility-administered medications for this visit. ALLERGIES  No Known Allergies    PHYSICAL EXAM    /64 (Site: Left Upper Arm, Position: Sitting, Cuff Size: Medium Adult)   Pulse 59   Resp 24   Ht 5' 6\" (1.676 m)   Wt 186 lb 14.4 oz (84.8 kg)   SpO2 95%   BMI 30.17 kg/m²     Physical Exam  Vitals and nursing note reviewed. Constitutional:       General: He is not in acute distress. Appearance: Normal appearance. He is well-developed. He is not ill-appearing or toxic-appearing. HENT:      Head: Normocephalic and atraumatic. Nose: Nose normal.      Mouth/Throat:      Mouth: Mucous membranes are moist.   Eyes:      Pupils: Pupils are equal, round, and reactive to light. Cardiovascular:      Rate and Rhythm: Normal rate and regular rhythm. Heart sounds: Normal heart sounds. No murmur heard. Pulmonary:      Effort: Pulmonary effort is normal. No respiratory distress. Breath sounds: Normal breath sounds. No wheezing, rhonchi or rales. Abdominal:      Palpations: Abdomen is soft. Musculoskeletal:         General: No swelling or deformity. Normal range of motion. Cervical back: Normal range of motion and neck supple. No rigidity. Lymphadenopathy:      Cervical: No cervical adenopathy. Skin:     General: Skin is warm and dry. Coloration: Skin is not jaundiced. Findings: No bruising. Neurological:      Mental Status: He is alert and oriented to person, place, and time. Mental status is at baseline. Cranial Nerves: No cranial nerve deficit. Psychiatric:         Mood and Affect: Mood normal.         Behavior: Behavior normal.         Thought Content: Thought content normal.         ASSESSMENT & PLAN     Diagnosis Orders   1. Anxiety     2. Hyperglycemia  Hemoglobin A1C   3. Primary hypertension  CBC    Comprehensive Metabolic Panel   4. Coronary artery disease involving native coronary artery of native heart without angina pectoris  LIPID PANEL   5. Gastroesophageal reflux disease without esophagitis     6. Simple chronic bronchitis ([facility] [address]     7. Prostate cancer screening  PSA Screening   Today encouraged to continue stay physically active and keep appointments with subspecialists. Overall I think he is doing well- we will check CBC, CMP, lipid panel, A1c, and a PSA and have him follow-up for all results. He is to get COVID booster #2 in the next month or 2. He is to call with other issues or problems. Return in about 6 months (around 12/30/2022).          Electronically signed by Micaela Atkins MD on 6/30/2022

## 2022-07-01 LAB
ESTIMATED AVERAGE GLUCOSE: 128.4 MG/DL
HBA1C MFR BLD: 6.1 %

## 2022-08-11 ENCOUNTER — OFFICE VISIT (OUTPATIENT)
Dept: CARDIOLOGY CLINIC | Age: 72
End: 2022-08-11
Payer: MEDICARE

## 2022-08-11 VITALS
HEIGHT: 66 IN | SYSTOLIC BLOOD PRESSURE: 128 MMHG | DIASTOLIC BLOOD PRESSURE: 82 MMHG | WEIGHT: 187 LBS | BODY MASS INDEX: 30.05 KG/M2 | HEART RATE: 68 BPM

## 2022-08-11 DIAGNOSIS — G25.81 RESTLESS LEG SYNDROME: ICD-10-CM

## 2022-08-11 DIAGNOSIS — I25.10 CORONARY ARTERY DISEASE INVOLVING NATIVE CORONARY ARTERY OF NATIVE HEART WITHOUT ANGINA PECTORIS: Primary | ICD-10-CM

## 2022-08-11 DIAGNOSIS — Z95.1 S/P CABG X 1: ICD-10-CM

## 2022-08-11 DIAGNOSIS — I10 PRIMARY HYPERTENSION: ICD-10-CM

## 2022-08-11 DIAGNOSIS — E78.5 HYPERLIPIDEMIA, UNSPECIFIED HYPERLIPIDEMIA TYPE: ICD-10-CM

## 2022-08-11 PROCEDURE — 99213 OFFICE O/P EST LOW 20 MIN: CPT | Performed by: INTERNAL MEDICINE

## 2022-08-11 PROCEDURE — G8417 CALC BMI ABV UP PARAM F/U: HCPCS | Performed by: INTERNAL MEDICINE

## 2022-08-11 PROCEDURE — 1036F TOBACCO NON-USER: CPT | Performed by: INTERNAL MEDICINE

## 2022-08-11 PROCEDURE — 3017F COLORECTAL CA SCREEN DOC REV: CPT | Performed by: INTERNAL MEDICINE

## 2022-08-11 PROCEDURE — 1123F ACP DISCUSS/DSCN MKR DOCD: CPT | Performed by: INTERNAL MEDICINE

## 2022-08-11 PROCEDURE — G8427 DOCREV CUR MEDS BY ELIG CLIN: HCPCS | Performed by: INTERNAL MEDICINE

## 2022-08-11 NOTE — PROGRESS NOTES
Khanh  is a 66 y.o. male who has    CHIEF COMPLAINT AS FOLLOWS:  CHEST PAIN: Patient denies any C/O chest pains at this time. SOB: No C/O SOB at this time. LEG EDEMA: No leg edema   PALPITATIONS: Denies any C/O Palpitations   DIZZINESS: No C/O Dizziness   SYNCOPE: None   OTHER/ ADDITIONAL COMPLAINTS:                                     HPI: Patient is here for F/U on his CAD, HTN & Dyslipidemia problems. CAD: Patient has known CAD. Had CABG in the past.  HTN: Patient has known essential HTN. Has been treated with guideline recommended medical / physical/ diet therapy as stated below. Dyslipidemia: Patient has known mixed dyslipidemia. Has been treated with guideline recommended medical / physical/ diet therapy as stated below. Current Outpatient Medications   Medication Sig Dispense Refill    sertraline (ZOLOFT) 50 MG tablet TAKE 1 TABLET BY MOUTH ONCE DAILY 30 tablet 3    amLODIPine (NORVASC) 5 MG tablet TAKE ONE (1) TABLET BY MOUTH ONCE DAILY 30 tablet 5    omeprazole (PRILOSEC) 40 MG delayed release capsule TAKE ONE (1) CAPSULE BY MOUTH ONCE DAILY 30 capsule 5    ezetimibe (ZETIA) 10 MG tablet Take 1 tablet by mouth daily 30 tablet 5    Multiple Vitamin (MULTIVITAMIN ADULT PO) Take by mouth      nitroGLYCERIN (NITROSTAT) 0.4 MG SL tablet Place 1 tablet under the tongue every 5 minutes as needed for Chest pain up to max of 3 total doses. If no relief after 1 dose, call 911. 25 tablet 3    pramipexole (MIRAPEX) 0.25 MG tablet Take 1 tablet by mouth nightly 30 tablet 5    atorvastatin (LIPITOR) 40 MG tablet Take 1 tablet by mouth daily 30 tablet 5    aspirin 81 MG EC tablet Take 81 mg by mouth daily. No current facility-administered medications for this visit. Allergies: Patient has no known allergies.   Review of Systems:    Constitutional: Negative for diaphoresis and fatigue  Respiratory: Negative for shortness of breath  Cardiovascular: Negative for chest pain, dyspnea on exertion, claudication, edema, irregular heartbeat, murmur, palpitations or shortness of breath  Musculoskeletal: Negative for muscle pain, muscular weakness, negative for pain in arm and leg or swelling in foot and leg    Objective:  /82   Pulse 68   Ht 5' 6\" (1.676 m)   Wt 187 lb (84.8 kg)   BMI 30.18 kg/m²   Wt Readings from Last 3 Encounters:   08/11/22 187 lb (84.8 kg)   06/30/22 186 lb 14.4 oz (84.8 kg)   06/07/22 184 lb (83.5 kg)     Body mass index is 30.18 kg/m². GENERAL - Alert, oriented, pleasant, in no apparent distress. EYES: No jaundice, no conjunctival pallor. Neck - Supple. No jugular venous distention noted. No carotid bruits. Cardiovascular - Normal S1 and S2 without obvious murmur or gallop. Extremities - No cyanosis, clubbing, or significant edema. Pulmonary - No respiratory distress. No wheezes or rales.       MEDICAL DECISION MAKING & DATA REVIEW:    Lab Review   Lab Results   Component Value Date/Time    TROPONINT <0.010 03/14/2019 04:31 PM     Lab Results   Component Value Date/Time    PROBNP 632.8 03/14/2019 04:31 PM     Lab Results   Component Value Date    INR 1.00 07/05/2019    INR 1.07 03/19/2019     Lab Results   Component Value Date    LABA1C 6.1 06/30/2022    LABA1C 6.1 05/28/2021     Lab Results   Component Value Date    WBC 4.9 06/30/2022    WBC 6.0 05/28/2021    HCT 46.1 06/30/2022    HCT 46.0 05/28/2021    MCV 87.1 06/30/2022    MCV 86.4 05/28/2021     06/30/2022     05/28/2021     Lab Results   Component Value Date    CHOL 118 06/30/2022    CHOL 122 07/16/2020    TRIG 85 06/30/2022    TRIG 129 07/16/2020    HDL 47 06/30/2022    HDL 42 05/28/2021    LDLCALC 54 06/30/2022    LDLCALC 44 05/28/2021    LDLDIRECT 67 02/08/2012    LDLDIRECT 66 02/16/2011     Lab Results   Component Value Date    ALT 36 06/30/2022    ALT 31 05/28/2021    AST 23 06/30/2022    AST 21 05/28/2021     BMP:    Lab Results   Component Value Date/Time     06/30/2022 09:34 AM     05/28/2021 07:03 AM    K 4.9 06/30/2022 09:34 AM    K 4.5 05/28/2021 07:03 AM     06/30/2022 09:34 AM     05/28/2021 07:03 AM    CO2 23 06/30/2022 09:34 AM    CO2 23 05/28/2021 07:03 AM    BUN 17 06/30/2022 09:34 AM    BUN 12 05/28/2021 07:03 AM    CREATININE 0.8 06/30/2022 09:34 AM    CREATININE 0.9 05/28/2021 07:03 AM     CMP:   Lab Results   Component Value Date/Time     06/30/2022 09:34 AM     05/28/2021 07:03 AM    K 4.9 06/30/2022 09:34 AM    K 4.5 05/28/2021 07:03 AM     06/30/2022 09:34 AM     05/28/2021 07:03 AM    CO2 23 06/30/2022 09:34 AM    CO2 23 05/28/2021 07:03 AM    BUN 17 06/30/2022 09:34 AM    BUN 12 05/28/2021 07:03 AM    CREATININE 0.8 06/30/2022 09:34 AM    CREATININE 0.9 05/28/2021 07:03 AM    PROT 7.2 06/30/2022 09:34 AM    PROT 7.0 05/28/2021 07:03 AM     Lab Results   Component Value Date/Time    TSH 2.3 10/14/2016 12:00 AM    TSH 2.4 08/16/2013 12:00 AM    TSHHS 3.950 03/14/2019 04:31 PM       QUALITY MEASURES REVIEWED:  1.CAD:Patient is taking anti platelet agent:Yes  2. DYSLIPIDEMIA: Patient is on cholesterol lowering medication:Yes   3. Beta-Blocker therapy for CAD, if prior Myocardial Infarction:No   4. Counselled regarding smoking cessation. No   Patient does not Smoke. 5.Anticoagulation therapy (for A.Fib) No   Does Not have A.Fib.  6.Discussed weight management strategies. Assessment & Plan:  Primary / Secondary prevention is the goal by aggressive risk modification, healthy and therapeutic life style changes for cardiovascular risk reduction. CORONARY ARTERY DISEASE:Yes, had CABG with . clinically stable. Patient is on optimal medical regimen ( see medication list above )  - Patient is currently  asymptomatic from CAD. - changes in  treatment:   no, on ASA           - Testing ordered: yes  [facility] classification: 1   2/16/2022    Normal study. Good exercise capacity. 10 METs work load. Physiological BP response to exercise. Normal perfusion study with normal distribution in all coronal, short, and    horizontal axis. The observed defect is consistent with diaphragmatic attenuation. Normal LV function. LVEF is 58 %. HTN:well controlled on current medical regimen, see list above. - changes in  treatment:   no, ON Amlodipine  ECHO 3/2019   Left ventricular systolic function is normal.   Ejection fraction is visually estimated at 50-55%. Mild left ventricular hypertrophy. Grade I diastolic dysfunction. Mildly dilated left atrium. No evidence of any pericardial effusion. CARDIOMYOPATHY: None known   CONGESTIVE HEART FAILURE: NO KNOWN HISTORY.      VHD: No significant VHD noted  DYSLIPIDEMIA: Patient's profile is at / near [employer],                                                               Tolerating current medical regimen wellyes,takes Lipitor & Zetia. See most recent Lab values in Labs section above. TESTS ORDERED:none this visit. PREVIOUSLY ORDERED TESTS REVIEWED & DISCUSSED WITH THE PATIENT:     I personally reviewed & interpreted, all previously ordered tests as copied above. Latest Labs are pulled in to the note with dates. Labs, specially in Reference to Lipid profile, Cardiac testing in the form of Echo ( dated: ), stress tests ( dated: ) & other relevant cardiac testing reviewed with patient & recommendations made based on assessment of the results. Discussed role of Cardiac risk factors & effects + treatment of co morbidities with patient & advised accordingly. MEDICATIONS: List of medications patient is currently taking is reviewed in detail with the patient. Discussed any side effects or problems taking the medication. Recommend Continue present management & medications as listed.      AFFIRMATION: I spent at least 20 minutes of time reviewing patient's history, previous & current medical problems & all Labs + testing. This includes chart prep even prior to the vosit. Various goals are discussed and multiple questions answered. Relevant concelling performed. Office follow up in six months.

## 2022-08-11 NOTE — PATIENT INSTRUCTIONS
Please be informed that if you contact our office outside of normal business hours the physician on call cannot help with any scheduling or rescheduling issues, procedure instruction questions or any type of medication issue. We advise you for any urgent/emergency that you go to the nearest emergency room! PLEASE CALL OUR OFFICE DURING NORMAL BUSINESS HOURS    Monday - Friday   8 am to 5 pm    [place] [facility] [facility]: [phone]    [place]:  [phone]  **It is YOUR responsibilty to bring medication bottles and/or updated medication list to [address] [place] [address]. This will allow us to better serve you and all your healthcare needs**  [facility] Locations - No appointment necessary. Sites open Monday to Friday. Call your preferred location for test preparation, business hours and other information you need. [employer] accepts [employer]. [facility] [address] Emmanuel Uribe, [address] [facility]  Phone: [phone] [facility] [address] [facility]  [address]. [facility], [address] [place] [address]  Phone: [phone]   CORONARY ARTERY DISEASE:Yes, had CABG with . clinically stable. Patient is on optimal medical regimen ( see medication list above )  - Patient is currently  asymptomatic from CAD. - changes in  treatment:   no, on ASA           - Testing ordered: yes  [facility] classification: 1   2/16/2022    Normal study. Good exercise capacity. 10 METs work load. Physiological BP response to exercise. Normal perfusion study with normal distribution in all coronal, short, and    horizontal axis. The observed defect is consistent with diaphragmatic attenuation. Normal LV function. LVEF is 58 %. HTN:well controlled on current medical regimen, see list above. - changes in  treatment:   no, ON Amlodipine  ECHO 3/2019   Left ventricular systolic function is normal.   Ejection fraction is visually estimated at 50-55%.    Mild left ventricular hypertrophy. Grade I diastolic dysfunction. Mildly dilated left atrium. No evidence of any pericardial effusion. CARDIOMYOPATHY: None known   CONGESTIVE HEART FAILURE: NO KNOWN HISTORY.      VHD: No significant VHD noted  DYSLIPIDEMIA: Patient's profile is at / near [employer],                                                               Tolerating current medical regimen wellyes,takes Lipitor & Zetia. See most recent Lab values in Labs section above. TESTS ORDERED:none this visit. PREVIOUSLY ORDERED TESTS REVIEWED & DISCUSSED WITH THE PATIENT:     I personally reviewed & interpreted, all previously ordered tests as copied above. Latest Labs are pulled in to the note with dates. Labs, specially in Reference to Lipid profile, Cardiac testing in the form of Echo ( dated: ), stress tests ( dated: ) & other relevant cardiac testing reviewed with patient & recommendations made based on assessment of the results. Discussed role of Cardiac risk factors & effects + treatment of co morbidities with patient & advised accordingly. MEDICATIONS: List of medications patient is currently taking is reviewed in detail with the patient. Discussed any side effects or problems taking the medication. Recommend Continue present management & medications as listed. AFFIRMATION: I spent at least 20 minutes of time reviewing patient's history, previous & current medical problems & all Labs + testing. This includes chart prep even prior to the vosit. Various goals are discussed and multiple questions answered. Relevant concelling performed. Office follow up in six months.

## 2022-08-11 NOTE — LETTER
[facility]  [address] [address]  Phone: [phone]  Fax: [phone]    Johanna Powers MD    August 11, 2022     Dale Cadena, [address] [place] [id number]    Patient: Zo Nunez   MR Number: [id number]   YOB: 1950   Date of Visit: 8/11/2022       Dear Dale Cadena:    Thank you for referring Lisa Gomez to me for evaluation/treatment. Below are the relevant portions of my assessment and plan of care. If you have questions, please do not hesitate to call me. I look forward to following Machelle Thomas along with you.     Sincerely,      Johanna Powers MD

## 2022-08-11 NOTE — PROGRESS NOTES
Vein \"LEG PROBLEM Questionnaire\"  Do you have prominent leg veins? No   Do you have any skin discoloration? No  Do you have any healed or active sores? No  Do you have swelling of the legs? No  Do you have a family history of varicose veins? Yes  Does your profession involve pro-longed        standing or heavy lifting? Yes  7. Have you been fighting overweight problems? No  8. Do you have restless legs? yes  9. Do you have any night time cramps? No  10. Do you have any of the following in your legs:         I  11. If Yes - Have they worn compression stockings No  12.  If they have worn compression stockings

## 2022-08-17 ENCOUNTER — TELEMEDICINE (OUTPATIENT)
Dept: FAMILY MEDICINE CLINIC | Age: 72
End: 2022-08-17
Payer: MEDICARE

## 2022-08-17 DIAGNOSIS — Z00.00 MEDICARE ANNUAL WELLNESS VISIT, SUBSEQUENT: Primary | ICD-10-CM

## 2022-08-17 PROCEDURE — G0439 PPPS, SUBSEQ VISIT: HCPCS

## 2022-08-17 PROCEDURE — 3017F COLORECTAL CA SCREEN DOC REV: CPT

## 2022-08-17 PROCEDURE — 1123F ACP DISCUSS/DSCN MKR DOCD: CPT

## 2022-08-17 SDOH — ECONOMIC STABILITY: FOOD INSECURITY: WITHIN THE PAST 12 MONTHS, THE FOOD YOU BOUGHT JUST DIDN'T LAST AND YOU DIDN'T HAVE MONEY TO GET MORE.: NEVER TRUE

## 2022-08-17 SDOH — ECONOMIC STABILITY: FOOD INSECURITY: WITHIN THE PAST 12 MONTHS, YOU WORRIED THAT YOUR FOOD WOULD RUN OUT BEFORE YOU GOT MONEY TO BUY MORE.: NEVER TRUE

## 2022-08-17 ASSESSMENT — LIFESTYLE VARIABLES: HOW MANY STANDARD DRINKS CONTAINING ALCOHOL DO YOU HAVE ON A TYPICAL DAY: 3 OR 4

## 2022-08-17 ASSESSMENT — SOCIAL DETERMINANTS OF HEALTH (SDOH): HOW HARD IS IT FOR YOU TO PAY FOR THE VERY BASICS LIKE FOOD, HOUSING, MEDICAL CARE, AND HEATING?: NOT HARD AT ALL

## 2022-08-17 NOTE — PATIENT INSTRUCTIONS
Personalized Preventive Plan for Yulissa De La Garza - 8/17/2022  Medicare offers a range of preventive health benefits. Some of the tests and screenings are paid in full while other may be subject to a deductible, co-insurance, and/or copay. Some of these benefits include a comprehensive review of your medical history including lifestyle, illnesses that may run in your family, and various assessments and screenings as appropriate. After reviewing your medical record and screening and assessments performed today your provider may have ordered immunizations, labs, imaging, and/or referrals for you. A list of these orders (if applicable) as well as your Preventive Care list are included within your After Visit Summary for your review. Other Preventive Recommendations:    A preventive eye exam performed by an eye specialist is recommended every 1-2 years to screen for glaucoma; cataracts, macular degeneration, and other eye disorders. A preventive dental visit is recommended every 6 months. Try to get at least 150 minutes of exercise per week or 10,000 steps per day on a pedometer . Order or download the FREE \"Exercise & Physical Activity: Your Everyday Guide\" from [employer]. Call 9-637.778.7483 or search [employer] online. You need 5637-4895 mg of calcium and 1930-7518 IU of vitamin D per day. It is possible to meet your calcium requirement with diet alone, but a vitamin D supplement is usually necessary to meet this goal.  When exposed to the sun, use a sunscreen that protects against both UVA and UVB radiation with an SPF of 30 or greater. Reapply every 2 to 3 hours or after sweating, drying off with a towel, or swimming. Always wear a seat belt when traveling in a car. Always wear a helmet when riding a bicycle or motorcycle.

## 2022-08-17 NOTE — PROGRESS NOTES
Medicare Annual Wellness Visit    Jimi Riley is here for Medicare AWV    Assessment & Plan   Medicare annual wellness visit, subsequent    Recommendations for Preventive Services Due: see orders and patient instructions/AVS.  Recommended screening schedule for the next 5-10 years is provided to the patient in written form: see Patient Instructions/AVS.     Return for Medicare Annual Wellness Visit in 1 year. Subjective       Patient's complete Health Risk Assessment and screening values have been reviewed and are found in Flowsheets. The following problems were reviewed today and where indicated follow up appointments were made and/or referrals ordered. Positive Risk Factor Screenings with Interventions:             General Health and ACP:  General  In general, how would you say your health is?: Very Good  In the past 7 days, have you experienced any of the following: New or Increased Pain, New or Increased Fatigue, Loneliness, Social Isolation, Stress or Anger?: No  Do you get the social and emotional support that you need?: Yes  Do you have a Living Will?: (!) No      Advance Directives       Power of  Living Will ACP-Advance Directive ACP-Power of     Not on File Not on File Not on File Not on File          General Health Risk Interventions:  No Living Will: Patient reports that he and his wife have talked about getting a living will and durable healthcare power of  paperwork filled out.   Patient educated on advance care planning referral, would like to discuss this referral with his wife and will let office know if they are interested in the referral.    Health Habits/Nutrition:  Physical Activity: Inactive    Days of Exercise per Week: 0 days    Minutes of Exercise per Session: 0 min     Have you lost any weight without trying in the past 3 months?: No     Have you seen the dentist within the past year?: Yes  Health Habits/Nutrition Interventions:  Inadequate physical activity: encouraged patient to increase physical activity. Even a 10 to 15-minute walk daily. Patient is agreeable to consider this. Safety:  Do you have working smoke detectors?: Yes  Do you have any tripping hazards - loose or unsecured carpets or rugs?: No  Do you have any tripping hazards - clutter in doorways, halls, or stairs?: No  Do you have either shower bars, grab bars, non-slip mats or non-slip surfaces in your shower or bathtub?: Yes  Do all of your stairways have a railing or banister?: (!) No  Do you always fasten your seatbelt when you are in a car?: Yes  Safety Interventions:  Home safety tips provided           Objective      Patient-Reported Vitals  Patient-Reported Weight: 187lb  Patient-Reported Height: 5'6\"            No Known Allergies  Prior to Visit Medications    Medication Sig Taking? Authorizing Provider   sertraline (ZOLOFT) 50 MG tablet TAKE 1 TABLET BY MOUTH ONCE DAILY Yes June Bernstein MD   amLODIPine (NORVASC) 5 MG tablet TAKE ONE (1) TABLET BY MOUTH ONCE DAILY Yes Johanna Powers MD   omeprazole (PRILOSEC) 40 MG delayed release capsule TAKE ONE (1) CAPSULE BY MOUTH ONCE DAILY Yes June Bernstein MD   Multiple Vitamin (MULTIVITAMIN ADULT PO) Take by mouth Yes Historical Provider, MD   nitroGLYCERIN (NITROSTAT) 0.4 MG SL tablet Place 1 tablet under the tongue every 5 minutes as needed for Chest pain up to max of 3 total doses. If no relief after 1 dose, call 911. Yes Johanna Powers MD   pramipexole (MIRAPEX) 0.25 MG tablet Take 1 tablet by mouth nightly Yes June Bernstein MD   atorvastatin (LIPITOR) 40 MG tablet Take 1 tablet by mouth daily Yes June Bernstein MD   aspirin 81 MG EC tablet Take 81 mg by mouth daily.    Yes Historical Provider, MD   ezetimibe (ZETIA) 10 MG tablet Take 1 tablet by mouth daily  Johanna Powers MD       CareTeam (Including outside providers/suppliers regularly involved in providing care):   Patient Care Team:  Shade Rangel Claudio Simmons MD as PCP - Salo Roach MD as PCP - [facility]  Matthew Trujillo MD as Consulting Physician (Cardiology)  Alexy Cottrell MD as Consulting Physician (Electrophysiology)     Reviewed and updated this visit:  Tobacco  Allergies  Meds  Problems  Med Hx  Surg Hx  Soc Hx  Fam Hx            Ronn Mark, was evaluated through a synchronous (real-time) audio-video encounter. The patient (or guardian if applicable) is aware that this is a billable service, which includes applicable co-pays. This Virtual Visit was conducted with patient's (and/or legal guardian's) consent. The visit was conducted pursuant to the emergency declaration under the [facility] [place] [address] authority and the [employer] Act. Patient identification was verified, and a caregiver was present when appropriate. The patient was located at Home: [address] [facility] [facility] [address]. Provider was located at [facility] ([address] [place] [address]): [address] [facility]. [place] [id number].

## 2022-08-23 RX ORDER — ATORVASTATIN CALCIUM 40 MG/1
TABLET, FILM COATED ORAL
Qty: 30 TABLET | Refills: 5 | Status: SHIPPED | OUTPATIENT
Start: 2022-08-23 | End: 2022-10-27 | Stop reason: SDUPTHER

## 2022-10-27 ENCOUNTER — OFFICE VISIT (OUTPATIENT)
Dept: FAMILY MEDICINE CLINIC | Age: 72
End: 2022-10-27
Payer: MEDICARE

## 2022-10-27 VITALS
HEIGHT: 66 IN | WEIGHT: 187.9 LBS | BODY MASS INDEX: 30.2 KG/M2 | HEART RATE: 55 BPM | DIASTOLIC BLOOD PRESSURE: 86 MMHG | OXYGEN SATURATION: 97 % | RESPIRATION RATE: 18 BRPM | SYSTOLIC BLOOD PRESSURE: 126 MMHG

## 2022-10-27 DIAGNOSIS — R73.9 HYPERGLYCEMIA: ICD-10-CM

## 2022-10-27 DIAGNOSIS — J41.0 SIMPLE CHRONIC BRONCHITIS (HCC): ICD-10-CM

## 2022-10-27 DIAGNOSIS — I10 PRIMARY HYPERTENSION: ICD-10-CM

## 2022-10-27 DIAGNOSIS — I25.10 CORONARY ARTERY DISEASE INVOLVING NATIVE CORONARY ARTERY OF NATIVE HEART WITHOUT ANGINA PECTORIS: ICD-10-CM

## 2022-10-27 DIAGNOSIS — G25.81 RESTLESS LEG SYNDROME: ICD-10-CM

## 2022-10-27 DIAGNOSIS — K21.9 GASTROESOPHAGEAL REFLUX DISEASE WITHOUT ESOPHAGITIS: ICD-10-CM

## 2022-10-27 DIAGNOSIS — M54.50 ACUTE LOW BACK PAIN WITHOUT SCIATICA, UNSPECIFIED BACK PAIN LATERALITY: Primary | ICD-10-CM

## 2022-10-27 PROCEDURE — G8417 CALC BMI ABV UP PARAM F/U: HCPCS | Performed by: FAMILY MEDICINE

## 2022-10-27 PROCEDURE — G8427 DOCREV CUR MEDS BY ELIG CLIN: HCPCS | Performed by: FAMILY MEDICINE

## 2022-10-27 PROCEDURE — 3074F SYST BP LT 130 MM HG: CPT | Performed by: FAMILY MEDICINE

## 2022-10-27 PROCEDURE — 1036F TOBACCO NON-USER: CPT | Performed by: FAMILY MEDICINE

## 2022-10-27 PROCEDURE — 3023F SPIROM DOC REV: CPT | Performed by: FAMILY MEDICINE

## 2022-10-27 PROCEDURE — 90694 VACC AIIV4 NO PRSRV 0.5ML IM: CPT | Performed by: FAMILY MEDICINE

## 2022-10-27 PROCEDURE — G8484 FLU IMMUNIZE NO ADMIN: HCPCS | Performed by: FAMILY MEDICINE

## 2022-10-27 PROCEDURE — 3017F COLORECTAL CA SCREEN DOC REV: CPT | Performed by: FAMILY MEDICINE

## 2022-10-27 PROCEDURE — 1123F ACP DISCUSS/DSCN MKR DOCD: CPT | Performed by: FAMILY MEDICINE

## 2022-10-27 PROCEDURE — G0008 ADMIN INFLUENZA VIRUS VAC: HCPCS | Performed by: FAMILY MEDICINE

## 2022-10-27 PROCEDURE — 3078F DIAST BP <80 MM HG: CPT | Performed by: FAMILY MEDICINE

## 2022-10-27 PROCEDURE — 99214 OFFICE O/P EST MOD 30 MIN: CPT | Performed by: FAMILY MEDICINE

## 2022-10-27 RX ORDER — METHYLPREDNISOLONE 4 MG/1
TABLET ORAL
Qty: 1 KIT | Refills: 0 | Status: SHIPPED | OUTPATIENT
Start: 2022-10-27 | End: 2022-11-02

## 2022-10-27 RX ORDER — OMEPRAZOLE 40 MG/1
CAPSULE, DELAYED RELEASE ORAL
Qty: 30 CAPSULE | Refills: 5 | Status: SHIPPED | OUTPATIENT
Start: 2022-10-27

## 2022-10-27 RX ORDER — EZETIMIBE 10 MG/1
10 TABLET ORAL DAILY
Qty: 60 TABLET | Refills: 5 | Status: SHIPPED | OUTPATIENT
Start: 2022-10-27

## 2022-10-27 RX ORDER — ATORVASTATIN CALCIUM 40 MG/1
TABLET, FILM COATED ORAL
Qty: 30 TABLET | Refills: 5 | Status: SHIPPED | OUTPATIENT
Start: 2022-10-27

## 2022-10-27 ASSESSMENT — ENCOUNTER SYMPTOMS
TROUBLE SWALLOWING: 0
SHORTNESS OF BREATH: 0
BACK PAIN: 1
CHEST TIGHTNESS: 0
BLOOD IN STOOL: 0
EYE PAIN: 0
WHEEZING: 0
DIARRHEA: 0
ABDOMINAL PAIN: 0
NAUSEA: 0
VOMITING: 0

## 2022-10-27 NOTE — PROGRESS NOTES
Injection given in right deltoid. Patient had a little bleeding. Pressure applied. Patinet instructed to use ice if bruising occurs. Patient voiced understanding.

## 2022-10-27 NOTE — PROGRESS NOTES
10/27/2022    Keron Gaitan    Chief Complaint   Patient presents with    3 Month Follow-Up     4 month. Back Pain     Lower back pain. Per patient belt level. X 1 weeks. Taking ibuprofen. HPI  History was obtained from the patient. Paul Bolanos is a 66 y.o. male who presents today with routine follow-up on coronary disease, GERD, hypertension, COPD, anxiety, hyperglycemia, and restless leg syndrome. Overall he is back to working full-time but will be going back to part-time again soon. Actually been feeling well except for the last few days we have been riding in his truck quite a bit he is having some low back pain bilateral no radicular symptoms or bladder or bowel function. No acute injury reported labs in June of this year look good patient's immunizations show that he needs high-dose flu shot today and needs to get COVID booster in near future. REVIEW OF SYMPTOMS    Review of Systems   Constitutional:  Negative for activity change and fatigue. HENT:  Negative for congestion, hearing loss, mouth sores and trouble swallowing. Eyes:  Negative for pain and visual disturbance. Respiratory:  Negative for chest tightness, shortness of breath and wheezing. Patient COPD is doing well   Cardiovascular:  Negative for chest pain and palpitations. Patient with known coronary disease but no recent chest pain or shortness of breath. Gastrointestinal:  Negative for abdominal pain, blood in stool, diarrhea, nausea and vomiting. Endocrine: Negative for polydipsia and polyuria. Genitourinary:  Negative for dysuria, frequency and urgency. Musculoskeletal:  Positive for arthralgias and back pain. Negative for gait problem and neck stiffness. Skin:  Negative for rash. Allergic/Immunologic: Negative for environmental allergies. Neurological:  Negative for dizziness, seizures, speech difficulty and weakness. RLS is stable   Hematological:  Does not bruise/bleed easily. Psychiatric/Behavioral:  Negative for agitation, confusion, dysphoric mood, hallucinations, self-injury and suicidal ideas. The patient is not nervous/anxious. PAST MEDICAL HISTORY  Past Medical History:   Diagnosis Date    Abnormal CT scan     per pt \"did CT scan and found spot on spine( T 8 lesion) back in March( 2019) did bx but did not show anything and now ( 7/5/2019) going to try do another bx\"    BPH (benign prostatic hyperplasia)     CAD (coronary artery disease)     follows with Dr Scot Porter    Chronic cough 2/18/2020    COPD (chronic obstructive pulmonary disease) ([facility] [address] 4/5/2019    follow with Dr Barak Maldonado    Coronary arteriosclerosis     Excessive daytime sleepiness 4/5/2019    Family history of coronary artery disease     Former smoker 8/17/2020    GERD (gastroesophageal reflux disease)     H/O cardiac catheterization 03-    ([facility]) Total occ. LAD w/mild disease of LM and RCA. H/O cardiovascular stress test 02-    (Exercise) EF 58%. Normal. Exercise cap. 11.0 METS. H/O cardiovascular stress test 05-    (Cardiolite) EF 63%. Good exercise capacity. Hypertensive blood pressure response tp exercise. ETT neg for ischemia or arrhythmia. Cardiolite perfusion imaging reveals Ant. wall soft tissue attenuation artifact and mild ischemia of Inf. wall. H/O colonoscopy with polypectomy 02-    Polyp in Rectum    H/O Doppler ultrasound 03-    (Carotid) No anatomical abnormalities. Normal    H/O echocardiogram 02- 6/14 EF 50-55%  abn LV diast stage 1 2/10EF 50-55%. Abn. LV Diastolic function Stage 1. Left Atrium borderline Dilated. H/O gastritis     Years Ago    History of cardiac monitoring 11/18/2017    30 day monitoring: normal, Sinus rhythm noted.  no symptoms and no a fib or arrhythmia noted    History of complete ECG 04-    [place] (hard of hearing)     noé hearing aides    HTN (hypertension)     Hx of cardiovascular stress test 10/28/2015 cardiolite-mild ischemia apical,EF64%    Hx of echocardiogram 2016    EF 55-60%. LA is mildly dilated. RV is mildly dilated. Mild MR. Mild tricuspid insufficiency. Significant VHD is absent. Hyperlipidemia     Narcolepsy     Nocturnal hypoxemia 2019    Obstructive sleep apnea 2019    \"had sleep study done and they said I do not have sleep apnea, put me on oxygen at night \"    On home oxygen therapy     2l/nc at night only    Periodic limb movements of sleep 2019    Polyp of colon     Reflux     RLS (restless legs syndrome)     S/P CABG x 1 2002    Stricture of esophagus     S/P Dilation (Dr. Mar Cedeno)    UTI (urinary tract infection) 2019    Wears glasses        FAMILY HISTORY  Family History   Problem Relation Age of Onset    Heart Disease Mother         Pacemaker, MVR, CABG    Stroke Mother     Cancer Father         lung ca- smoker       SOCIAL HISTORY  Social History     Socioeconomic History    Marital status:     Number of children: 2   Tobacco Use    Smoking status: Former     Packs/day: 0.00     Years: 10.00     Pack years: 0.00     Types: Pipe, Cigarettes     Quit date: 1985     Years since quittin.8    Smokeless tobacco: Never   Vaping Use    Vaping Use: Never used   Substance and Sexual Activity    Alcohol use:  Yes     Alcohol/week: 0.0 standard drinks     Comment: occ alcohol/ caffeine 2 cups of coffee a day    Drug use: No     Social Determinants of Health     Financial Resource Strain: Low Risk     Difficulty of Paying Living Expenses: Not hard at all   Food Insecurity: No Food Insecurity    Worried About Running Out of Food in the Last Year: Never true    Ran Out of Food in the Last Year: Never true   Physical Activity: Inactive    Days of Exercise per Week: 0 days    Minutes of Exercise per Session: 0 min        SURGICAL HISTORY  Past Surgical History:   Procedure Laterality Date    BONE BIOPSY      per old chart bx T 8 3/2019    BONE BIOPSY N/A 07/05/2019    IR T-8    COLONOSCOPY  last one 2016 2008    COLONOSCOPY N/A 7/29/2021    COLONOSCOPY POLYPECTOMY SNARE/COLD BIOPSY performed by Antony Shane MD at [address] [place]  03-    x 1 - [place] to LAD - Dr. Anjum Tran Right 2019    TONSILLECTOMY  as a kid    [address]  Current Outpatient Medications   Medication Sig Dispense Refill    atorvastatin (LIPITOR) 40 MG tablet TAKE 1 TABLET BY MOUTH ONCE DAILY 30 tablet 5    omeprazole (PRILOSEC) 40 MG delayed release capsule TAKE ONE (1) CAPSULE BY MOUTH ONCE DAILY 30 capsule 5    methylPREDNISolone (MEDROL DOSEPACK) 4 MG tablet Take by mouth. 1 kit 0    sertraline (ZOLOFT) 50 MG tablet TAKE 1 TABLET BY MOUTH ONCE DAILY 30 tablet 3    amLODIPine (NORVASC) 5 MG tablet TAKE ONE (1) TABLET BY MOUTH ONCE DAILY 30 tablet 5    Multiple Vitamin (MULTIVITAMIN ADULT PO) Take by mouth      nitroGLYCERIN (NITROSTAT) 0.4 MG SL tablet Place 1 tablet under the tongue every 5 minutes as needed for Chest pain up to max of 3 total doses. If no relief after 1 dose, call 911. 25 tablet 3    pramipexole (MIRAPEX) 0.25 MG tablet Take 1 tablet by mouth nightly 30 tablet 5    aspirin 81 MG EC tablet Take 81 mg by mouth daily. ezetimibe (ZETIA) 10 MG tablet Take 1 tablet by mouth daily 60 tablet 5     No current facility-administered medications for this visit. ALLERGIES  No Known Allergies    PHYSICAL EXAM    /86 (Site: Right Upper Arm, Position: Sitting, Cuff Size: Medium Adult)   Pulse 55   Resp 18   Ht 5' 6\" (1.676 m)   Wt 187 lb 14.4 oz (85.2 kg)   SpO2 97%   BMI 30.33 kg/m²     Physical Exam  Vitals and nursing note reviewed. Constitutional:       General: He is not in acute distress. Appearance: Normal appearance. He is well-developed. He is not ill-appearing or toxic-appearing. HENT:      Head: Normocephalic and atraumatic.       Nose: Nose normal. No congestion. Mouth/Throat:      Mouth: Mucous membranes are moist.   Eyes:      Pupils: Pupils are equal, round, and reactive to light. Cardiovascular:      Rate and Rhythm: Normal rate and regular rhythm. Heart sounds: Normal heart sounds. No murmur heard. No gallop. Pulmonary:      Effort: Pulmonary effort is normal. No respiratory distress. Breath sounds: Normal breath sounds. No wheezing, rhonchi or rales. Abdominal:      Palpations: Abdomen is soft. Musculoskeletal:         General: No swelling or deformity. Normal range of motion. Cervical back: Normal range of motion and neck supple. No rigidity. Lymphadenopathy:      Cervical: No cervical adenopathy. Skin:     General: Skin is warm and dry. Coloration: Skin is not jaundiced. Findings: No bruising. Neurological:      General: No focal deficit present. Mental Status: He is alert and oriented to person, place, and time. Mental status is at baseline. Cranial Nerves: No cranial nerve deficit. Motor: No weakness. Gait: Gait normal.   Psychiatric:         Mood and Affect: Mood normal.         Behavior: Behavior normal.         Thought Content: Thought content normal.       ASSESSMENT & PLAN     Diagnosis Orders   1. Acute low back pain without sciatica, unspecified back pain laterality        2. Primary hypertension        3. Coronary artery disease involving native coronary artery of native heart without angina pectoris        4. Simple chronic bronchitis ([address]        5. Restless leg syndrome        6. Hyperglycemia        7. Gastroesophageal reflux disease without esophagitis        He is to continue with healthy lifestyle and work on same healthy diet. .  Patient to follow-up with subspecialists as directed- med list reviewed and refills given. Questions were answered,also. We will treat back pain with alternating ice with heat locally a Medrol 4 mg Dosepak. In addition I gave him some back stretching exercises to do. He is to call with other issues or problems. He did receive the high-dose flu shot today also. Return in about 4 months (around 2/27/2023).          Electronically signed by Nicholas Mtz MD on 10/27/2022

## 2022-11-10 DIAGNOSIS — G25.81 RESTLESS LEG SYNDROME: ICD-10-CM

## 2022-11-10 RX ORDER — PRAMIPEXOLE DIHYDROCHLORIDE 0.25 MG/1
0.25 TABLET ORAL NIGHTLY
Qty: 30 TABLET | Refills: 5 | OUTPATIENT
Start: 2022-11-10

## 2022-11-24 DIAGNOSIS — G25.81 RESTLESS LEG SYNDROME: ICD-10-CM

## 2022-11-28 RX ORDER — PRAMIPEXOLE DIHYDROCHLORIDE 0.25 MG/1
0.25 TABLET ORAL NIGHTLY
Qty: 30 TABLET | Refills: 5 | Status: SHIPPED | OUTPATIENT
Start: 2022-11-28

## 2022-12-14 RX ORDER — AMLODIPINE BESYLATE 5 MG/1
5 TABLET ORAL DAILY
Qty: 30 TABLET | Refills: 5 | Status: SHIPPED | OUTPATIENT
Start: 2022-12-14

## 2023-02-15 ENCOUNTER — OFFICE VISIT (OUTPATIENT)
Dept: FAMILY MEDICINE CLINIC | Age: 73
End: 2023-02-15
Payer: MEDICARE

## 2023-02-15 VITALS
HEART RATE: 61 BPM | BODY MASS INDEX: 30.7 KG/M2 | DIASTOLIC BLOOD PRESSURE: 64 MMHG | SYSTOLIC BLOOD PRESSURE: 128 MMHG | WEIGHT: 191 LBS | OXYGEN SATURATION: 91 % | HEIGHT: 66 IN

## 2023-02-15 DIAGNOSIS — J41.0 SIMPLE CHRONIC BRONCHITIS (HCC): ICD-10-CM

## 2023-02-15 DIAGNOSIS — F41.9 ANXIETY: ICD-10-CM

## 2023-02-15 DIAGNOSIS — K21.9 GASTROESOPHAGEAL REFLUX DISEASE WITHOUT ESOPHAGITIS: ICD-10-CM

## 2023-02-15 DIAGNOSIS — I25.10 CORONARY ARTERY DISEASE INVOLVING NATIVE CORONARY ARTERY OF NATIVE HEART WITHOUT ANGINA PECTORIS: ICD-10-CM

## 2023-02-15 DIAGNOSIS — I10 PRIMARY HYPERTENSION: Primary | ICD-10-CM

## 2023-02-15 DIAGNOSIS — G25.81 RESTLESS LEG SYNDROME: ICD-10-CM

## 2023-02-15 PROCEDURE — 1123F ACP DISCUSS/DSCN MKR DOCD: CPT | Performed by: FAMILY MEDICINE

## 2023-02-15 PROCEDURE — G8417 CALC BMI ABV UP PARAM F/U: HCPCS | Performed by: FAMILY MEDICINE

## 2023-02-15 PROCEDURE — 3078F DIAST BP <80 MM HG: CPT | Performed by: FAMILY MEDICINE

## 2023-02-15 PROCEDURE — 3017F COLORECTAL CA SCREEN DOC REV: CPT | Performed by: FAMILY MEDICINE

## 2023-02-15 PROCEDURE — 3023F SPIROM DOC REV: CPT | Performed by: FAMILY MEDICINE

## 2023-02-15 PROCEDURE — 1036F TOBACCO NON-USER: CPT | Performed by: FAMILY MEDICINE

## 2023-02-15 PROCEDURE — G8427 DOCREV CUR MEDS BY ELIG CLIN: HCPCS | Performed by: FAMILY MEDICINE

## 2023-02-15 PROCEDURE — 99213 OFFICE O/P EST LOW 20 MIN: CPT | Performed by: FAMILY MEDICINE

## 2023-02-15 PROCEDURE — 3074F SYST BP LT 130 MM HG: CPT | Performed by: FAMILY MEDICINE

## 2023-02-15 PROCEDURE — G8484 FLU IMMUNIZE NO ADMIN: HCPCS | Performed by: FAMILY MEDICINE

## 2023-02-15 RX ORDER — OMEPRAZOLE 40 MG/1
CAPSULE, DELAYED RELEASE ORAL
Qty: 30 CAPSULE | Refills: 5 | Status: SHIPPED | OUTPATIENT
Start: 2023-02-15

## 2023-02-15 ASSESSMENT — PATIENT HEALTH QUESTIONNAIRE - PHQ9
2. FEELING DOWN, DEPRESSED OR HOPELESS: 0
SUM OF ALL RESPONSES TO PHQ9 QUESTIONS 1 & 2: 0
SUM OF ALL RESPONSES TO PHQ QUESTIONS 1-9: 0
1. LITTLE INTEREST OR PLEASURE IN DOING THINGS: 0

## 2023-02-15 ASSESSMENT — ENCOUNTER SYMPTOMS
TROUBLE SWALLOWING: 0
EYE PAIN: 0
VOMITING: 0
WHEEZING: 0
CHEST TIGHTNESS: 0
BLOOD IN STOOL: 0
DIARRHEA: 0
ABDOMINAL PAIN: 0
NAUSEA: 0
SHORTNESS OF BREATH: 0

## 2023-02-15 NOTE — PROGRESS NOTES
2/15/2023    [facility]    Chief Complaint   Patient presents with    3 Month Follow-Up     4 month       HPI  History was obtained from the patient. Mercedes Jones is a 67 y.o. male who presents today with routine recheck on coronary disease, GERD, hypertension, COPD, anxiety, and restless leg syndrome. Still working pretty much full-time he hopes to go to part-time this summer. Labs in June 2022 look good. He denies chest pain or shortness of breath does follow regularly with subspecialist.  He has had no increase in wheezing. Denies increase in anxiety meds he is currently on are well-tolerated he has had no increased issues with restless leg syndrome and Mirapex is tolerated. GERD symptoms are under control. Chronic cough is about the same to slightly better. Weight is stable vital signs look good mood is fine    REVIEW OF SYMPTOMS    Review of Systems   Constitutional:  Negative for activity change and fatigue. HENT:  Negative for congestion, hearing loss, mouth sores and trouble swallowing. Eyes:  Negative for pain and visual disturbance. Respiratory:  Negative for chest tightness, shortness of breath and wheezing. Patient with COPD denies increased wheezing or shortness of breath. Cardiovascular:  Negative for chest pain and palpitations. Gastrointestinal:  Negative for abdominal pain, blood in stool, diarrhea, nausea and vomiting. Endocrine: Negative for cold intolerance, polydipsia and polyuria. Genitourinary:  Negative for dysuria, frequency and urgency. Musculoskeletal:  Negative for arthralgias, gait problem and neck stiffness. Skin:  Negative for rash. Allergic/Immunologic: Negative for environmental allergies. Neurological:  Negative for dizziness, seizures, speech difficulty and weakness. Patient with restless leg syndrome. Hematological:  Does not bruise/bleed easily.    Psychiatric/Behavioral:  Negative for agitation, confusion, dysphoric mood, hallucinations, self-injury and suicidal ideas. The patient is nervous/anxious. PAST MEDICAL HISTORY  Past Medical History:   Diagnosis Date    Abnormal CT scan     per pt \"did CT scan and found spot on spine( T 8 lesion) back in March( 2019) did bx but did not show anything and now ( 7/5/2019) going to try do another bx\"    BPH (benign prostatic hyperplasia)     CAD (coronary artery disease)     follows with Dr Abhishek Stahl    Chronic cough 2/18/2020    COPD (chronic obstructive pulmonary disease) ([address] 4/5/2019    follow with Dr Janessa Arellano    Coronary arteriosclerosis     Excessive daytime sleepiness 4/5/2019    Family history of coronary artery disease     Former smoker 8/17/2020    GERD (gastroesophageal reflux disease)     H/O cardiac catheterization 03-    (C) Total occ. LAD w/mild disease of LM and RCA. H/O cardiovascular stress test 02-    (Exercise) EF 58%. Normal. Exercise cap. 11.0 METS. H/O cardiovascular stress test 05-    (Cardiolite) EF 63%. Good exercise capacity. Hypertensive blood pressure response tp exercise. ETT neg for ischemia or arrhythmia. Cardiolite perfusion imaging reveals Ant. wall soft tissue attenuation artifact and mild ischemia of Inf. wall. H/O colonoscopy with polypectomy 02-    Polyp in Rectum    H/O Doppler ultrasound 03-    (Carotid) No anatomical abnormalities. Normal    H/O echocardiogram 02- 6/14 EF 50-55%  abn LV diast stage 1 2/10EF 50-55%. Abn. LV Diastolic function Stage 1. Left Atrium borderline Dilated. H/O gastritis     Years Ago    History of cardiac monitoring 11/18/2017    30 day monitoring: normal, Sinus rhythm noted. no symptoms and no a fib or arrhythmia noted    History of complete ECG 04-    [place] (hard of hearing)     noé hearing aides    HTN (hypertension)     Hx of cardiovascular stress test 10/28/2015    cardiolite-mild ischemia apical,EF64%    Hx of echocardiogram 6/9/2016    EF 55-60%. LA is mildly dilated. RV is mildly dilated. Mild MR. Mild tricuspid insufficiency. Significant VHD is absent. Hyperlipidemia     Narcolepsy     Nocturnal hypoxemia 2019    Obstructive sleep apnea 2019    \"had sleep study done and they said I do not have sleep apnea, put me on oxygen at night \"    On home oxygen therapy     2l/nc at night only    Periodic limb movements of sleep 2019    Polyp of colon     Reflux     RLS (restless legs syndrome)     S/P CABG x 1 2002    Stricture of esophagus     S/P Dilation (Dr. Mickey Choe)    UTI (urinary tract infection) 2019    Wears glasses        FAMILY HISTORY  Family History   Problem Relation Age of Onset    Heart Disease Mother         Pacemaker, MVR, CABG    Stroke Mother     Cancer Father         lung ca- smoker       SOCIAL HISTORY  Social History     Socioeconomic History    Marital status:      Spouse name: None    Number of children: 2    Years of education: None    Highest education level: None   Tobacco Use    Smoking status: Former     Packs/day: 0.00     Years: 10.00     Pack years: 0.00     Types: Pipe, Cigarettes     Quit date: 1985     Years since quittin.1    Smokeless tobacco: Never   Vaping Use    Vaping Use: Never used   Substance and Sexual Activity    Alcohol use:  Yes     Alcohol/week: 0.0 standard drinks     Comment: occ alcohol/ caffeine 2 cups of coffee a day    Drug use: No     Social Determinants of Health     Financial Resource Strain: Low Risk     Difficulty of Paying Living Expenses: Not hard at all   Food Insecurity: No Food Insecurity    Worried About Running Out of Food in the Last Year: Never true    Ran Out of Food in the Last Year: Never true   Physical Activity: Inactive    Days of Exercise per Week: 0 days    Minutes of Exercise per Session: 0 min        SURGICAL HISTORY  Past Surgical History:   Procedure Laterality Date    BONE BIOPSY      per old chart bx T 8 3/2019    BONE BIOPSY N/A 2019    IR T-8 COLONOSCOPY  last one 2016 2008    COLONOSCOPY N/A 7/29/2021    COLONOSCOPY POLYPECTOMY SNARE/COLD BIOPSY performed by Marlee Mcintosh MD at [address] [place]  03-    x 1 - [place] to LAD - Dr. Maeve Whittington Right 2019    TONSILLECTOMY  as a kid    [address]  Current Outpatient Medications   Medication Sig Dispense Refill    omeprazole (PRILOSEC) 40 MG delayed release capsule TAKE ONE (1) CAPSULE BY MOUTH ONCE DAILY 30 capsule 5    sertraline (ZOLOFT) 50 MG tablet TAKE 1 TABLET BY MOUTH ONCE DAILY 30 tablet 5    amLODIPine (NORVASC) 5 MG tablet Take 1 tablet by mouth daily 30 tablet 5    pramipexole (MIRAPEX) 0.25 MG tablet Take 1 tablet by mouth nightly 30 tablet 5    ezetimibe (ZETIA) 10 MG tablet Take 1 tablet by mouth daily 60 tablet 5    atorvastatin (LIPITOR) 40 MG tablet TAKE 1 TABLET BY MOUTH ONCE DAILY 30 tablet 5    Multiple Vitamin (MULTIVITAMIN ADULT PO) Take by mouth      nitroGLYCERIN (NITROSTAT) 0.4 MG SL tablet Place 1 tablet under the tongue every 5 minutes as needed for Chest pain up to max of 3 total doses. If no relief after 1 dose, call 911. 25 tablet 3    aspirin 81 MG EC tablet Take 81 mg by mouth daily. No current facility-administered medications for this visit. ALLERGIES  No Known Allergies    PHYSICAL EXAM    /64 (Site: Right Upper Arm, Position: Sitting, Cuff Size: Medium Adult)   Pulse 61   Ht 5' 6\" (1.676 m)   Wt 191 lb (86.6 kg)   SpO2 91%   BMI 30.83 kg/m²     Physical Exam  Vitals and nursing note reviewed. Constitutional:       General: He is not in acute distress. Appearance: Normal appearance. He is well-developed. He is not ill-appearing or toxic-appearing. HENT:      Head: Normocephalic and atraumatic. Nose: Nose normal. No congestion.       Mouth/Throat:      Mouth: Mucous membranes are moist.   Eyes:      Pupils: Pupils are equal, round, and reactive to light. Cardiovascular:      Rate and Rhythm: Normal rate and regular rhythm. Heart sounds: Normal heart sounds. No murmur heard. No gallop. Pulmonary:      Effort: Pulmonary effort is normal. No respiratory distress. Breath sounds: Normal breath sounds. No wheezing, rhonchi or rales. Abdominal:      Palpations: Abdomen is soft. Musculoskeletal:         General: No deformity. Normal range of motion. Cervical back: Normal range of motion and neck supple. No rigidity. Lymphadenopathy:      Cervical: No cervical adenopathy. Skin:     General: Skin is warm and dry. Coloration: Skin is not jaundiced. Findings: No bruising. Neurological:      General: No focal deficit present. Mental Status: He is alert and oriented to person, place, and time. Mental status is at baseline. Cranial Nerves: No cranial nerve deficit. Motor: No weakness. Psychiatric:         Mood and Affect: Mood normal.         Behavior: Behavior normal.         Thought Content: Thought content normal.       ASSESSMENT & PLAN     Diagnosis Orders   1. Primary hypertension        2. Coronary artery disease involving native coronary artery of native heart without angina pectoris        3. Simple chronic bronchitis ([address]        4. Anxiety        5. Gastroesophageal reflux disease without esophagitis        6. Restless leg syndrome        At this time he is to continue on same regimen :continue with healthy lifestyle, see subspecialists as directed, and continue with regular exercise. He will call with changes or problems. We will consider lab work at that time. Return in about 4 months (around 6/15/2023).          Electronically signed by Malorie Alegre MD on 2/15/2023

## 2023-02-16 ENCOUNTER — OFFICE VISIT (OUTPATIENT)
Dept: CARDIOLOGY CLINIC | Age: 73
End: 2023-02-16

## 2023-02-16 VITALS
WEIGHT: 192 LBS | HEIGHT: 66 IN | BODY MASS INDEX: 30.86 KG/M2 | DIASTOLIC BLOOD PRESSURE: 60 MMHG | HEART RATE: 70 BPM | SYSTOLIC BLOOD PRESSURE: 124 MMHG

## 2023-02-16 DIAGNOSIS — I25.10 CORONARY ARTERY DISEASE INVOLVING NATIVE CORONARY ARTERY OF NATIVE HEART WITHOUT ANGINA PECTORIS: Primary | ICD-10-CM

## 2023-02-16 DIAGNOSIS — I10 PRIMARY HYPERTENSION: ICD-10-CM

## 2023-02-16 DIAGNOSIS — E78.5 HYPERLIPIDEMIA, UNSPECIFIED HYPERLIPIDEMIA TYPE: ICD-10-CM

## 2023-02-16 NOTE — PATIENT INSTRUCTIONS
Please be informed that if you contact our office outside of normal business hours the physician on call cannot help with any scheduling or rescheduling issues, procedure instruction questions or any type of medication issue. We advise you for any urgent/emergency that you go to the nearest emergency room! PLEASE CALL OUR OFFICE DURING NORMAL BUSINESS HOURS    Monday - Friday   8 am to 5 pm    RebeccaRenée Novak 12: [phone]    [place]:  [phone]  **It is YOUR responsibilty to bring medication bottles and/or updated medication list to [address] [place] [address]. This will allow us to better serve you and all your healthcare needs**  [facility] Locations - No appointment necessary. Sites open Monday to Friday. Call your preferred location for test preparation, business   hours and other information you need. [employer] accepts [employer]. [address] Emmanuel Uribe, [address] [facility]  Phone: [phone] Silvina Cristina  [address] [facility]  [address] Silvina Cristina, [address] [facility]  Phone: [phone]     Thank you for allowing us to care for you today! We want to ensure we can follow your treatment plan and we strive to give you the best outcomes and experience possible. If you ever have a life threatening emergency and call 911 - for an ambulance (EMS)   Our providers can only care for you at:   [facility] or [facility]. Even if you have someone take you or you drive yourself we can only care for you in a [address] [facility] facility. Our providers are not setup at the other healthcare locations!

## 2023-02-16 NOTE — PROGRESS NOTES
Vein \"LEG PROBLEM Questionnaire\"  Do you have prominent leg veins? No   Do you have any skin discoloration? No  Do you have any healed or active sores? No  Do you have swelling of the legs? No  Do you have a family history of varicose veins? Yes  Does your profession involve pro-longed        standing or heavy lifting? Yes, back then  7. Have you been fighting overweight problems? No  8. Do you have restless legs? Yes  9. Do you have any night time cramps? No  10. Do you have any of the following in your legs:         I  11. If Yes - Have they worn compression stockings No  12.  If they have worn compression stockings

## 2023-02-23 ENCOUNTER — OFFICE VISIT (OUTPATIENT)
Dept: CARDIOLOGY CLINIC | Age: 73
End: 2023-02-23
Payer: MEDICARE

## 2023-02-23 VITALS
BODY MASS INDEX: 30.7 KG/M2 | DIASTOLIC BLOOD PRESSURE: 64 MMHG | HEIGHT: 66 IN | HEART RATE: 64 BPM | SYSTOLIC BLOOD PRESSURE: 134 MMHG | WEIGHT: 191 LBS

## 2023-02-23 DIAGNOSIS — Z87.891 FORMER SMOKER: ICD-10-CM

## 2023-02-23 DIAGNOSIS — I25.10 CORONARY ARTERY DISEASE INVOLVING NATIVE CORONARY ARTERY OF NATIVE HEART WITHOUT ANGINA PECTORIS: Primary | ICD-10-CM

## 2023-02-23 DIAGNOSIS — Z82.49 FAMILY HISTORY OF CORONARY ARTERY DISEASE: ICD-10-CM

## 2023-02-23 DIAGNOSIS — I10 PRIMARY HYPERTENSION: ICD-10-CM

## 2023-02-23 DIAGNOSIS — Z95.1 S/P CABG X 1: ICD-10-CM

## 2023-02-23 DIAGNOSIS — E78.5 HYPERLIPIDEMIA, UNSPECIFIED HYPERLIPIDEMIA TYPE: ICD-10-CM

## 2023-02-23 PROCEDURE — 1123F ACP DISCUSS/DSCN MKR DOCD: CPT | Performed by: INTERNAL MEDICINE

## 2023-02-23 PROCEDURE — 99213 OFFICE O/P EST LOW 20 MIN: CPT | Performed by: INTERNAL MEDICINE

## 2023-02-23 PROCEDURE — G8484 FLU IMMUNIZE NO ADMIN: HCPCS | Performed by: INTERNAL MEDICINE

## 2023-02-23 PROCEDURE — 3075F SYST BP GE 130 - 139MM HG: CPT | Performed by: INTERNAL MEDICINE

## 2023-02-23 PROCEDURE — 3017F COLORECTAL CA SCREEN DOC REV: CPT | Performed by: INTERNAL MEDICINE

## 2023-02-23 PROCEDURE — G8427 DOCREV CUR MEDS BY ELIG CLIN: HCPCS | Performed by: INTERNAL MEDICINE

## 2023-02-23 PROCEDURE — 1036F TOBACCO NON-USER: CPT | Performed by: INTERNAL MEDICINE

## 2023-02-23 PROCEDURE — G8417 CALC BMI ABV UP PARAM F/U: HCPCS | Performed by: INTERNAL MEDICINE

## 2023-02-23 PROCEDURE — 3078F DIAST BP <80 MM HG: CPT | Performed by: INTERNAL MEDICINE

## 2023-02-23 NOTE — PATIENT INSTRUCTIONS
Please be informed that if you contact our office outside of normal business hours the physician on call cannot help with any scheduling or rescheduling issues, procedure instruction questions or any type of medication issue. We advise you for any urgent/emergency that you go to the nearest emergency room! PLEASE CALL OUR OFFICE DURING NORMAL BUSINESS HOURS    Monday - Friday   8 am to 5 pm    [place]: K[place] 12: [phone]    [place]:  [phone]  **It is YOUR responsibilty to bring medication bottles and/or updated medication list to [address] [place] [address]. This will allow us to better serve you and all your healthcare needs**  [facility] Locations - No appointment necessary. Sites open Monday to Friday. Call your preferred location for test preparation, business   hours and other information you need. [employer] accepts [employer]. [address]. Emmanuel Uribe, [address] [facility]  Phone: [phone] [place]  [address] [facility]  [address] [place]anne [place], [address] [facility]  Phone: [phone]     Thank you for allowing us to care for you today! We want to ensure we can follow your treatment plan and we strive to give you the best outcomes and experience possible. If you ever have a life threatening emergency and call 911 - for an ambulance (EMS)   Our providers can only care for you at:   [facility] or LTAC, located within [facility]. Even if you have someone take you or you drive yourself we can only care for you in a [address] [facility] facility. Our providers are not setup at the other healthcare locations! CORONARY ARTERY DISEASE:Yes, had CABG with . clinically stable. Patient is on optimal medical regimen ( see medication list above )  - Patient is currently  asymptomatic from CAD. - changes in  treatment:   no, on ASA           - Testing ordered: yes  [place] classification: 1   2/16/2022    Normal study. Good exercise capacity. 10 METs work load. Physiological BP response to exercise. Normal perfusion study with normal distribution in all coronal, short, and    horizontal axis. The observed defect is consistent with diaphragmatic attenuation. Normal LV function. LVEF is 58 %. HTN:well controlled on current medical regimen, see list above. - changes in  treatment:   no, ON Amlodipine  ECHO 3/2019   Left ventricular systolic function is normal.   Ejection fraction is visually estimated at 50-55%. Mild left ventricular hypertrophy. Grade I diastolic dysfunction. Mildly dilated left atrium. No evidence of any pericardial effusion. CARDIOMYOPATHY: None known   CONGESTIVE HEART FAILURE: NO KNOWN HISTORY.      VHD: No significant VHD noted  DYSLIPIDEMIA: Patient's profile is at / near [employer],                                                               Tolerating current medical regimen wellyes,takes Lipitor & Zetia. See most recent Lab values in Labs section above. TESTS ORDERED:none this visit. PREVIOUSLY ORDERED TESTS REVIEWED & DISCUSSED WITH THE PATIENT:     I personally reviewed & interpreted, all previously ordered tests as copied above. Latest Labs are pulled in to the note with dates. Labs, specially in Reference to Lipid profile, Cardiac testing in the form of Echo ( dated: ), stress tests ( dated: ) & other relevant cardiac testing reviewed with patient & recommendations made based on assessment of the results. Discussed role of Cardiac risk factors & effects + treatment of co morbidities with patient & advised accordingly. MEDICATIONS: List of medications patient is currently taking is reviewed in detail with the patient. Discussed any side effects or problems taking the medication. Recommend Continue present management & medications as listed.      AFFIRMATION: I spent at least 20 minutes of time reviewing patient's history, previous & current medical problems & all Labs + testing. This includes chart prep even prior to the vosit. Various goals are discussed and multiple questions answered. Relevant concelling performed. Office follow up in six months.

## 2023-02-23 NOTE — PROGRESS NOTES
Rosa M Mcnulty is a 67 y.o. male who has    CHIEF COMPLAINT AS FOLLOWS:  CHEST PAIN: Patient denies any C/O chest pains at this time. SOB: No C/O SOB at this time. LEG EDEMA: No leg edema   PALPITATIONS: Denies any C/O Palpitations   DIZZINESS: No C/O Dizziness   SYNCOPE: None   OTHER/ ADDITIONAL COMPLAINTS:                                     HPI: Patient is here for F/U on his CAD,HTN & Dyslipidemia problems. CAD: Patient has known CAD. Had angioplasty / CABG in the past.  HTN: Patient has known essential HTN. Has been treated with guideline recommended medical / physical/ diet therapy as stated below. Dyslipidemia: Patient has known mixed dyslipidemia. Has been treated with guideline recommended medical / physical/ diet therapy as stated below. Current Outpatient Medications   Medication Sig Dispense Refill    omeprazole (PRILOSEC) 40 MG delayed release capsule TAKE ONE (1) CAPSULE BY MOUTH ONCE DAILY 30 capsule 5    sertraline (ZOLOFT) 50 MG tablet TAKE 1 TABLET BY MOUTH ONCE DAILY 30 tablet 5    amLODIPine (NORVASC) 5 MG tablet Take 1 tablet by mouth daily 30 tablet 5    pramipexole (MIRAPEX) 0.25 MG tablet Take 1 tablet by mouth nightly 30 tablet 5    ezetimibe (ZETIA) 10 MG tablet Take 1 tablet by mouth daily 60 tablet 5    atorvastatin (LIPITOR) 40 MG tablet TAKE 1 TABLET BY MOUTH ONCE DAILY 30 tablet 5    Multiple Vitamin (MULTIVITAMIN ADULT PO) Take by mouth      nitroGLYCERIN (NITROSTAT) 0.4 MG SL tablet Place 1 tablet under the tongue every 5 minutes as needed for Chest pain up to max of 3 total doses. If no relief after 1 dose, call 911. 25 tablet 3    aspirin 81 MG EC tablet Take 81 mg by mouth daily. No current facility-administered medications for this visit. Allergies: Patient has no known allergies.   Review of Systems:    Constitutional: Negative for diaphoresis and fatigue  Respiratory: Negative for shortness of breath  Cardiovascular: Negative for chest pain, dyspnea on exertion, claudication, edema, irregular heartbeat, murmur, palpitations or shortness of breath  Musculoskeletal: Negative for muscle pain, muscular weakness, negative for pain in arm and leg or swelling in foot and leg    Objective:  /64 (Site: Left Upper Arm, Position: Sitting, Cuff Size: Large Adult)   Pulse 64   Ht 5' 6\" (1.676 m)   Wt 191 lb (86.6 kg)   BMI 30.83 kg/m²   Wt Readings from Last 3 Encounters:   02/23/23 191 lb (86.6 kg)   02/16/23 192 lb (87.1 kg)   02/15/23 191 lb (86.6 kg)     Body mass index is 30.83 kg/m². GENERAL - Alert, oriented, pleasant, in no apparent distress. EYES: No jaundice, no conjunctival pallor. Neck - Supple. No jugular venous distention noted. No carotid bruits. Cardiovascular - Normal S1 and S2 without obvious murmur or gallop. Extremities - No cyanosis, clubbing, or significant edema. Pulmonary - No respiratory distress. No wheezes or rales.       MEDICAL DECISION MAKING & DATA REVIEW:    Lab Review   Lab Results   Component Value Date/Time    TROPONINT <0.010 03/14/2019 04:31 PM     Lab Results   Component Value Date/Time    PROBNP 632.8 03/14/2019 04:31 PM     Lab Results   Component Value Date    INR 1.00 07/05/2019    INR 1.07 03/19/2019     Lab Results   Component Value Date    LABA1C 6.1 06/30/2022    LABA1C 6.1 05/28/2021     Lab Results   Component Value Date    WBC 4.9 06/30/2022    WBC 6.0 05/28/2021    HCT 46.1 06/30/2022    HCT 46.0 05/28/2021    MCV 87.1 06/30/2022    MCV 86.4 05/28/2021     06/30/2022     05/28/2021     Lab Results   Component Value Date    CHOL 118 06/30/2022    CHOL 122 07/16/2020    TRIG 85 06/30/2022    TRIG 129 07/16/2020    HDL 47 06/30/2022    HDL 42 05/28/2021    LDLCALC 54 06/30/2022    LDLCALC 44 05/28/2021    LDLDIRECT 67 02/08/2012    LDLDIRECT 66 02/16/2011     Lab Results   Component Value Date    ALT 36 06/30/2022    ALT 31 05/28/2021 AST 23 06/30/2022    AST 21 05/28/2021     BMP:    Lab Results   Component Value Date/Time     06/30/2022 09:34 AM     05/28/2021 07:03 AM    K 4.9 06/30/2022 09:34 AM    K 4.5 05/28/2021 07:03 AM     06/30/2022 09:34 AM     05/28/2021 07:03 AM    CO2 23 06/30/2022 09:34 AM    CO2 23 05/28/2021 07:03 AM    BUN 17 06/30/2022 09:34 AM    BUN 12 05/28/2021 07:03 AM    CREATININE 0.8 06/30/2022 09:34 AM    CREATININE 0.9 05/28/2021 07:03 AM     CMP:   Lab Results   Component Value Date/Time     06/30/2022 09:34 AM     05/28/2021 07:03 AM    K 4.9 06/30/2022 09:34 AM    K 4.5 05/28/2021 07:03 AM     06/30/2022 09:34 AM     05/28/2021 07:03 AM    CO2 23 06/30/2022 09:34 AM    CO2 23 05/28/2021 07:03 AM    BUN 17 06/30/2022 09:34 AM    BUN 12 05/28/2021 07:03 AM    CREATININE 0.8 06/30/2022 09:34 AM    CREATININE 0.9 05/28/2021 07:03 AM    PROT 7.2 06/30/2022 09:34 AM    PROT 7.0 05/28/2021 07:03 AM     Lab Results   Component Value Date/Time    TSH 2.3 10/14/2016 12:00 AM    TSH 2.4 08/16/2013 12:00 AM    TSHHS 3.950 03/14/2019 04:31 PM       QUALITY MEASURES REVIEWED:  1.CAD:Patient is taking anti platelet agent:Yes  2. DYSLIPIDEMIA: Patient is on cholesterol lowering medication:Yes   3. Beta-Blocker therapy for CAD, if prior Myocardial Infarction:No   4. Counselled regarding smoking cessation. No   Patient does not Smoke. 5.Anticoagulation therapy (for A.Fib) No   Does Not have A.Fib.  6.Discussed weight management strategies. Assessment & Plan:  Primary / Secondary prevention is the goal by aggressive risk modification, healthy and therapeutic life style changes for cardiovascular risk reduction. CORONARY ARTERY DISEASE:Yes, had CABG with . clinically stable. Patient is on optimal medical regimen ( see medication list above )  - Patient is currently  asymptomatic from CAD.            - changes in  treatment:   no, on ASA           - Testing ordered: yes  [place] classification: 1   2/16/2022    Normal study. Good exercise capacity. 10 METs work load. Physiological BP response to exercise. Normal perfusion study with normal distribution in all coronal, short, and    horizontal axis. The observed defect is consistent with diaphragmatic attenuation. Normal LV function. LVEF is 58 %. HTN:well controlled on current medical regimen, see list above. - changes in  treatment:   no, ON Amlodipine  ECHO 3/2019   Left ventricular systolic function is normal.   Ejection fraction is visually estimated at 50-55%. Mild left ventricular hypertrophy. Grade I diastolic dysfunction. Mildly dilated left atrium. No evidence of any pericardial effusion. CARDIOMYOPATHY: None known   CONGESTIVE HEART FAILURE: NO KNOWN HISTORY.      VHD: No significant VHD noted  DYSLIPIDEMIA: Patient's profile is at / near [employer],                                                               Tolerating current medical regimen wellyes,takes Lipitor & Zetia. See most recent Lab values in Labs section above. TESTS ORDERED:none this visit. PREVIOUSLY ORDERED TESTS REVIEWED & DISCUSSED WITH THE PATIENT:     I personally reviewed & interpreted, all previously ordered tests as copied above. Latest Labs are pulled in to the note with dates. Labs, specially in Reference to Lipid profile, Cardiac testing in the form of Echo ( dated: ), stress tests ( dated: ) & other relevant cardiac testing reviewed with patient & recommendations made based on assessment of the results. Discussed role of Cardiac risk factors & effects + treatment of co morbidities with patient & advised accordingly. MEDICATIONS: List of medications patient is currently taking is reviewed in detail with the patient. Discussed any side effects or problems taking the medication.      Recommend Continue present management & medications as listed. AFFIRMATION: I spent at least 20 minutes of time reviewing patient's history, previous & current medical problems & all Labs + testing. This includes chart prep even prior to the vosit. Various goals are discussed and multiple questions answered. Relevant concelling performed. Office follow up in six months.

## 2023-02-23 NOTE — PROGRESS NOTES
Vein \"LEG PROBLEM Questionnaire\"  Do you have prominent leg veins? No   Do you have any skin discoloration? No  Do you have any healed or active sores? No  Do you have swelling of the legs? No  Do you have a family history of varicose veins? No  Does your profession involve pro-longed        standing or heavy lifting? Yes, back then  7. Have you been fighting overweight problems? No  8. Do you have restless legs? Yes  9. Do you have any night time cramps? No  10. Do you have any of the following in your legs:         I  11. If Yes - Have they worn compression stockings No  12.  If they have worn compression stockings

## 2023-02-23 NOTE — LETTER
[facility]  [address] [address]  Phone: [phone]  Fax: [phone]    Itzel Becerra MD    February 23, 2023     Leah Fishman, [address] [place] [id number]    Patient: Kierra Saravia   MR Number: [id number]   YOB: 1950   Date of Visit: 2/23/2023       Dear Leah Fishman:    Thank you for referring Blaire Jones to me for evaluation/treatment. Below are the relevant portions of my assessment and plan of care. If you have questions, please do not hesitate to call me. I look forward to following Ldaarius Tang along with you.     Sincerely,      Itzel Becerra MD

## 2023-05-05 RX ORDER — EZETIMIBE 10 MG/1
10 TABLET ORAL DAILY
Qty: 30 TABLET | Refills: 5 | Status: SHIPPED | OUTPATIENT
Start: 2023-05-05

## 2023-06-09 ENCOUNTER — OFFICE VISIT (OUTPATIENT)
Dept: FAMILY MEDICINE CLINIC | Age: 73
End: 2023-06-09
Payer: MEDICARE

## 2023-06-09 VITALS
OXYGEN SATURATION: 95 % | HEIGHT: 66 IN | HEART RATE: 60 BPM | WEIGHT: 190.2 LBS | DIASTOLIC BLOOD PRESSURE: 60 MMHG | SYSTOLIC BLOOD PRESSURE: 122 MMHG | BODY MASS INDEX: 30.57 KG/M2 | RESPIRATION RATE: 18 BRPM

## 2023-06-09 DIAGNOSIS — I25.10 CORONARY ARTERY DISEASE INVOLVING NATIVE CORONARY ARTERY OF NATIVE HEART WITHOUT ANGINA PECTORIS: ICD-10-CM

## 2023-06-09 DIAGNOSIS — G25.81 RESTLESS LEG SYNDROME: ICD-10-CM

## 2023-06-09 DIAGNOSIS — F41.9 ANXIETY: ICD-10-CM

## 2023-06-09 DIAGNOSIS — J41.0 SIMPLE CHRONIC BRONCHITIS (HCC): ICD-10-CM

## 2023-06-09 DIAGNOSIS — D49.9 TUMOR: ICD-10-CM

## 2023-06-09 DIAGNOSIS — Z12.5 PROSTATE CANCER SCREENING: ICD-10-CM

## 2023-06-09 DIAGNOSIS — R73.9 HYPERGLYCEMIA: ICD-10-CM

## 2023-06-09 DIAGNOSIS — I10 PRIMARY HYPERTENSION: Primary | ICD-10-CM

## 2023-06-09 DIAGNOSIS — K21.9 GASTROESOPHAGEAL REFLUX DISEASE WITHOUT ESOPHAGITIS: ICD-10-CM

## 2023-06-09 PROCEDURE — 3074F SYST BP LT 130 MM HG: CPT | Performed by: FAMILY MEDICINE

## 2023-06-09 PROCEDURE — 3078F DIAST BP <80 MM HG: CPT | Performed by: FAMILY MEDICINE

## 2023-06-09 PROCEDURE — 99214 OFFICE O/P EST MOD 30 MIN: CPT | Performed by: FAMILY MEDICINE

## 2023-06-09 PROCEDURE — 3017F COLORECTAL CA SCREEN DOC REV: CPT | Performed by: FAMILY MEDICINE

## 2023-06-09 PROCEDURE — G8427 DOCREV CUR MEDS BY ELIG CLIN: HCPCS | Performed by: FAMILY MEDICINE

## 2023-06-09 PROCEDURE — 1036F TOBACCO NON-USER: CPT | Performed by: FAMILY MEDICINE

## 2023-06-09 PROCEDURE — G8417 CALC BMI ABV UP PARAM F/U: HCPCS | Performed by: FAMILY MEDICINE

## 2023-06-09 PROCEDURE — 3023F SPIROM DOC REV: CPT | Performed by: FAMILY MEDICINE

## 2023-06-09 PROCEDURE — 1123F ACP DISCUSS/DSCN MKR DOCD: CPT | Performed by: FAMILY MEDICINE

## 2023-06-09 RX ORDER — ATORVASTATIN CALCIUM 40 MG/1
TABLET, FILM COATED ORAL
Qty: 30 TABLET | Refills: 5 | Status: SHIPPED | OUTPATIENT
Start: 2023-06-09

## 2023-06-09 RX ORDER — OMEPRAZOLE 40 MG/1
CAPSULE, DELAYED RELEASE ORAL
Qty: 30 CAPSULE | Refills: 5 | Status: SHIPPED | OUTPATIENT
Start: 2023-06-09

## 2023-06-09 SDOH — ECONOMIC STABILITY: HOUSING INSECURITY
IN THE LAST 12 MONTHS, WAS THERE A TIME WHEN YOU DID NOT HAVE A STEADY PLACE TO SLEEP OR SLEPT IN A SHELTER (INCLUDING NOW)?: NO

## 2023-06-09 SDOH — ECONOMIC STABILITY: FOOD INSECURITY: WITHIN THE PAST 12 MONTHS, THE FOOD YOU BOUGHT JUST DIDN'T LAST AND YOU DIDN'T HAVE MONEY TO GET MORE.: NEVER TRUE

## 2023-06-09 SDOH — ECONOMIC STABILITY: FOOD INSECURITY: WITHIN THE PAST 12 MONTHS, YOU WORRIED THAT YOUR FOOD WOULD RUN OUT BEFORE YOU GOT MONEY TO BUY MORE.: NEVER TRUE

## 2023-06-09 SDOH — ECONOMIC STABILITY: INCOME INSECURITY: HOW HARD IS IT FOR YOU TO PAY FOR THE VERY BASICS LIKE FOOD, HOUSING, MEDICAL CARE, AND HEATING?: NOT VERY HARD

## 2023-06-09 ASSESSMENT — ENCOUNTER SYMPTOMS
NAUSEA: 0
DIARRHEA: 0
ABDOMINAL PAIN: 0
VOMITING: 0
SHORTNESS OF BREATH: 0
TROUBLE SWALLOWING: 0
WHEEZING: 0
BLOOD IN STOOL: 0
CHEST TIGHTNESS: 0
EYE PAIN: 0

## 2023-06-09 NOTE — PROGRESS NOTES
questions or problems. Return in about 4 months (around 10/9/2023).          Electronically signed by Guerrero Dunn MD on 6/9/2023

## 2023-06-22 ENCOUNTER — APPOINTMENT (OUTPATIENT)
Dept: GENERAL RADIOLOGY | Age: 73
End: 2023-06-22
Payer: COMMERCIAL

## 2023-06-22 ENCOUNTER — HOSPITAL ENCOUNTER (EMERGENCY)
Age: 73
Discharge: HOME | End: 2023-06-22
Attending: EMERGENCY MEDICINE
Payer: COMMERCIAL

## 2023-06-22 VITALS
DIASTOLIC BLOOD PRESSURE: 77 MMHG | BODY MASS INDEX: 29.89 KG/M2 | SYSTOLIC BLOOD PRESSURE: 151 MMHG | HEART RATE: 63 BPM | OXYGEN SATURATION: 95 % | HEIGHT: 66 IN | RESPIRATION RATE: 16 BRPM | WEIGHT: 186 LBS | TEMPERATURE: 97.2 F

## 2023-06-22 DIAGNOSIS — S90.31XA CONTUSION OF RIGHT FOOT, INITIAL ENCOUNTER: Primary | ICD-10-CM

## 2023-06-22 PROCEDURE — 99283 EMERGENCY DEPT VISIT LOW MDM: CPT

## 2023-06-22 PROCEDURE — 73630 X-RAY EXAM OF FOOT: CPT

## 2023-06-22 ASSESSMENT — PAIN SCALES - GENERAL: PAINLEVEL_OUTOF10: 3

## 2023-06-22 ASSESSMENT — PAIN - FUNCTIONAL ASSESSMENT: PAIN_FUNCTIONAL_ASSESSMENT: 0-10

## 2023-06-22 NOTE — ED PROVIDER NOTES
Emergency Department Encounter    Patient: Ken Gomes  MRN: [id number]  : 1950  Date of Evaluation: 2023  ED Provider:  Osmar Edouard MD    MDM:    Clinical Impression:  1. Contusion of right foot, initial encounter          Triage Chief Complaint: Foot Injury      Patient presents with foot injury as below. I completed a structured, evidence-based clinical evaluation to screen for acute emergent condition that poses a threat to life or bodily function. Diagnostic studies/Differential diagnosis included: (with independent interpretations \"as interpreted by me\" and tests considered but not performed) x-ray evaluation of the right foot as interpreted by me revealed no evidence of acute fracture or dislocation. There is evidence of foot contusion. Patient has no tenderness to palpation specifically in the area of the Lisfranc joint. Patient states that he is able to ambulate and declines the need for crutches or other orthotic devices. Patient is neurovascularly intact. No evidence of compartment syndrome. Patient will be treated supportively with RICE therapy and over-the-counter medications. No evidence of additional traumatic injury by history or physical exam.  Patient declined the need for pain medication in the emergency department. Medications ordered in the ED:  ED Medication Orders (From admission, onward)      None                PLAN  Disposition: Patient will be discharged with strict return precautions and follow up instructions.   Decision To Discharge 2023 05:35:15 PM     Disposition referral (if applicable):  Milagro Rodriguez MD  [address] [place] [address]  [phone]    In 2 days        Medications prescribed (if applicable):  Discharge Medication List as of 2023  5:38 PM              ===================================================================    HPI/Pertinent ROS:  Ken Gomes is a 67 y.o. male that presents complaining of

## 2023-06-22 NOTE — DISCHARGE INSTRUCTIONS
Return immediately to the emergency department if you experience new or worsened symptoms, inability to walk, changes in color or sensation of your foot, or for any other concerns. Rest, ice, elevate as discussed.

## 2023-06-23 ENCOUNTER — TELEPHONE (OUTPATIENT)
Dept: FAMILY MEDICINE CLINIC | Age: 73
End: 2023-06-23

## 2023-06-23 NOTE — TELEPHONE ENCOUNTER
Patient called in to [address] triage line. He was calling for an ER f/u visit as he was advised by ER. He did start with occupational health through his job for this injury. I advised him to call his work and see if they want him to follow up with occupational health for a [facility] claim. However if it is not [facility] we would be happy to see him for ER follow up visit. He will call us back if appointment needed.

## 2023-07-20 ENCOUNTER — NURSE ONLY (OUTPATIENT)
Dept: FAMILY MEDICINE CLINIC | Age: 73
End: 2023-07-20
Payer: MEDICARE

## 2023-07-20 DIAGNOSIS — Z23 NEED FOR COVID-19 VACCINE: Primary | ICD-10-CM

## 2023-07-20 PROCEDURE — 91312 COVID-19, PFIZER BIVALENT, DO NOT DILUTE, (AGE 12Y+), IM, 30 MCG/0.3 ML: CPT | Performed by: FAMILY MEDICINE

## 2023-07-20 PROCEDURE — 0124A COVID-19, PFIZER BIVALENT, DO NOT DILUTE, (AGE 12Y+), IM, 30 MCG/0.3 ML: CPT | Performed by: FAMILY MEDICINE

## 2023-07-20 PROCEDURE — 99999 PR OFFICE/OUTPT VISIT,PROCEDURE ONLY: CPT | Performed by: FAMILY MEDICINE

## 2023-08-23 ENCOUNTER — OFFICE VISIT (OUTPATIENT)
Dept: CARDIOLOGY CLINIC | Age: 73
End: 2023-08-23
Payer: MEDICARE

## 2023-08-23 VITALS
WEIGHT: 190.6 LBS | BODY MASS INDEX: 30.63 KG/M2 | DIASTOLIC BLOOD PRESSURE: 76 MMHG | HEIGHT: 66 IN | SYSTOLIC BLOOD PRESSURE: 122 MMHG | HEART RATE: 59 BPM

## 2023-08-23 DIAGNOSIS — G25.81 RESTLESS LEG SYNDROME: ICD-10-CM

## 2023-08-23 DIAGNOSIS — Z95.1 S/P CABG X 1: ICD-10-CM

## 2023-08-23 DIAGNOSIS — I25.10 CORONARY ARTERY DISEASE INVOLVING NATIVE CORONARY ARTERY OF NATIVE HEART WITHOUT ANGINA PECTORIS: Primary | ICD-10-CM

## 2023-08-23 DIAGNOSIS — Z82.49 FAMILY HISTORY OF CORONARY ARTERY DISEASE: ICD-10-CM

## 2023-08-23 DIAGNOSIS — Z87.891 FORMER SMOKER: ICD-10-CM

## 2023-08-23 DIAGNOSIS — E78.5 HYPERLIPIDEMIA, UNSPECIFIED HYPERLIPIDEMIA TYPE: ICD-10-CM

## 2023-08-23 DIAGNOSIS — I10 PRIMARY HYPERTENSION: ICD-10-CM

## 2023-08-23 PROCEDURE — 1036F TOBACCO NON-USER: CPT | Performed by: INTERNAL MEDICINE

## 2023-08-23 PROCEDURE — 3017F COLORECTAL CA SCREEN DOC REV: CPT | Performed by: INTERNAL MEDICINE

## 2023-08-23 PROCEDURE — G8427 DOCREV CUR MEDS BY ELIG CLIN: HCPCS | Performed by: INTERNAL MEDICINE

## 2023-08-23 PROCEDURE — G8417 CALC BMI ABV UP PARAM F/U: HCPCS | Performed by: INTERNAL MEDICINE

## 2023-08-23 PROCEDURE — 3078F DIAST BP <80 MM HG: CPT | Performed by: INTERNAL MEDICINE

## 2023-08-23 PROCEDURE — 1123F ACP DISCUSS/DSCN MKR DOCD: CPT | Performed by: INTERNAL MEDICINE

## 2023-08-23 PROCEDURE — 99213 OFFICE O/P EST LOW 20 MIN: CPT | Performed by: INTERNAL MEDICINE

## 2023-08-23 PROCEDURE — 3074F SYST BP LT 130 MM HG: CPT | Performed by: INTERNAL MEDICINE

## 2023-08-23 NOTE — PROGRESS NOTES
Philip Penaloza is a 67 y.o. male who has    CHIEF COMPLAINT AS FOLLOWS:  CHEST PAIN: Patient denies any C/O chest pains at this time. SOB: No C/O SOB at this time. LEG EDEMA: No leg edema   PALPITATIONS: Denies any C/O Palpitations   DIZZINESS: No C/O Dizziness   SYNCOPE: None   OTHER/ ADDITIONAL COMPLAINTS:                                     HPI: Patient is here for F/U on his CAD, HTN & Dyslipidemia problems. CAD: Patient has known CAD. Had CABG in the past.  HTN: Patient has known essential HTN. Has been treated with guideline recommended medical / physical/ diet therapy as stated below. Dyslipidemia: Patient has known mixed dyslipidemia. Has been treated with guideline recommended medical / physical/ diet therapy as stated below. Current Outpatient Medications   Medication Sig Dispense Refill    amLODIPine (NORVASC) 5 MG tablet Take 1 tablet by mouth daily 30 tablet 5    atorvastatin (LIPITOR) 40 MG tablet TAKE 1 TABLET BY MOUTH ONCE DAILY 30 tablet 5    omeprazole (PRILOSEC) 40 MG delayed release capsule TAKE ONE (1) CAPSULE BY MOUTH ONCE DAILY 30 capsule 5    ezetimibe (ZETIA) 10 MG tablet Take 1 tablet by mouth daily 30 tablet 5    sertraline (ZOLOFT) 50 MG tablet TAKE 1 TABLET BY MOUTH ONCE DAILY 30 tablet 5    pramipexole (MIRAPEX) 0.25 MG tablet Take 1 tablet by mouth nightly 30 tablet 5    Multiple Vitamin (MULTIVITAMIN ADULT PO) Take by mouth      nitroGLYCERIN (NITROSTAT) 0.4 MG SL tablet Place 1 tablet under the tongue every 5 minutes as needed for Chest pain up to max of 3 total doses. If no relief after 1 dose, call 911. 25 tablet 3    aspirin 81 MG EC tablet Take 1 tablet by mouth daily       No current facility-administered medications for this visit. Allergies: Patient has no known allergies.   Review of Systems:    Constitutional: Negative for diaphoresis and fatigue  Respiratory: Negative for shortness of breath  Cardiovascular:

## 2023-08-23 NOTE — PATIENT INSTRUCTIONS
CORONARY ARTERY DISEASE:Yes, had CABG with . clinically stable. Patient is on optimal medical regimen ( see medication list above )  - Patient is currently  asymptomatic from CAD. - changes in  treatment:   no, on ASA           - Testing ordered: yes  Stiven classification: 1   2/16/2022    Normal study. Good exercise capacity. 10 METs work load. Physiological BP response to exercise. Normal perfusion study with normal distribution in all coronal, short, and    horizontal axis. The observed defect is consistent with diaphragmatic attenuation. Normal LV function. LVEF is 58 %. HTN:well controlled on current medical regimen, see list above. - changes in  treatment:   no, ON Amlodipine  ECHO 3/2019   Left ventricular systolic function is normal.   Ejection fraction is visually estimated at 50-55%. Mild left ventricular hypertrophy. Grade I diastolic dysfunction. Mildly dilated left atrium. No evidence of any pericardial effusion. CARDIOMYOPATHY: None known   CONGESTIVE HEART FAILURE: NO KNOWN HISTORY.      VHD: No significant VHD noted    DYSLIPIDEMIA: Patient's profile is at / near [employer],                                                               Tolerating current medical regimen wellyes,takes Lipitor & Zetia. See most recent Lab values in Labs section above. TESTS ORDERED:none this visit. PREVIOUSLY ORDERED TESTS REVIEWED & DISCUSSED WITH THE PATIENT:     I personally reviewed & interpreted, all previously ordered tests as copied above. Latest Labs are pulled in to the note with dates. Labs, specially in Reference to Lipid profile, Cardiac testing in the form of Echo ( dated: ), stress tests ( dated: ) & other relevant cardiac testing reviewed with patient & recommendations made based on assessment of the results.     Discussed role of Cardiac risk factors & effects +

## 2023-10-09 ENCOUNTER — OFFICE VISIT (OUTPATIENT)
Dept: FAMILY MEDICINE CLINIC | Age: 73
End: 2023-10-09
Payer: MEDICARE

## 2023-10-09 VITALS
BODY MASS INDEX: 30.86 KG/M2 | DIASTOLIC BLOOD PRESSURE: 72 MMHG | SYSTOLIC BLOOD PRESSURE: 138 MMHG | HEART RATE: 63 BPM | OXYGEN SATURATION: 94 % | HEIGHT: 66 IN | WEIGHT: 192 LBS

## 2023-10-09 DIAGNOSIS — G25.81 RESTLESS LEG SYNDROME: ICD-10-CM

## 2023-10-09 DIAGNOSIS — I25.10 CORONARY ARTERY DISEASE INVOLVING NATIVE CORONARY ARTERY OF NATIVE HEART WITHOUT ANGINA PECTORIS: ICD-10-CM

## 2023-10-09 DIAGNOSIS — F41.9 ANXIETY: Primary | ICD-10-CM

## 2023-10-09 DIAGNOSIS — J41.0 SIMPLE CHRONIC BRONCHITIS (HCC): ICD-10-CM

## 2023-10-09 DIAGNOSIS — K21.9 GASTROESOPHAGEAL REFLUX DISEASE WITHOUT ESOPHAGITIS: ICD-10-CM

## 2023-10-09 DIAGNOSIS — R41.3 MEMORY CHANGE: ICD-10-CM

## 2023-10-09 DIAGNOSIS — R73.9 HYPERGLYCEMIA: ICD-10-CM

## 2023-10-09 DIAGNOSIS — I10 PRIMARY HYPERTENSION: ICD-10-CM

## 2023-10-09 PROCEDURE — 1123F ACP DISCUSS/DSCN MKR DOCD: CPT | Performed by: FAMILY MEDICINE

## 2023-10-09 PROCEDURE — G8484 FLU IMMUNIZE NO ADMIN: HCPCS | Performed by: FAMILY MEDICINE

## 2023-10-09 PROCEDURE — 3017F COLORECTAL CA SCREEN DOC REV: CPT | Performed by: FAMILY MEDICINE

## 2023-10-09 PROCEDURE — 3075F SYST BP GE 130 - 139MM HG: CPT | Performed by: FAMILY MEDICINE

## 2023-10-09 PROCEDURE — G8427 DOCREV CUR MEDS BY ELIG CLIN: HCPCS | Performed by: FAMILY MEDICINE

## 2023-10-09 PROCEDURE — G0008 ADMIN INFLUENZA VIRUS VAC: HCPCS | Performed by: FAMILY MEDICINE

## 2023-10-09 PROCEDURE — 90694 VACC AIIV4 NO PRSRV 0.5ML IM: CPT | Performed by: FAMILY MEDICINE

## 2023-10-09 PROCEDURE — 99213 OFFICE O/P EST LOW 20 MIN: CPT | Performed by: FAMILY MEDICINE

## 2023-10-09 PROCEDURE — 3078F DIAST BP <80 MM HG: CPT | Performed by: FAMILY MEDICINE

## 2023-10-09 PROCEDURE — 1036F TOBACCO NON-USER: CPT | Performed by: FAMILY MEDICINE

## 2023-10-09 PROCEDURE — G8417 CALC BMI ABV UP PARAM F/U: HCPCS | Performed by: FAMILY MEDICINE

## 2023-10-09 PROCEDURE — 3023F SPIROM DOC REV: CPT | Performed by: FAMILY MEDICINE

## 2023-10-09 ASSESSMENT — ENCOUNTER SYMPTOMS
WHEEZING: 0
SHORTNESS OF BREATH: 0
DIARRHEA: 0
EYE PAIN: 0
CHEST TIGHTNESS: 0
NAUSEA: 0
ABDOMINAL PAIN: 0
BLOOD IN STOOL: 0
TROUBLE SWALLOWING: 0
VOMITING: 0

## 2023-10-09 NOTE — PROGRESS NOTES
Injection given in right deltoid. Patient tolerated well.
4/9/2024).          Electronically signed by Nataly Esquivel MD on 10/9/2023

## 2023-11-02 RX ORDER — EZETIMIBE 10 MG/1
10 TABLET ORAL DAILY
Qty: 90 TABLET | Refills: 3 | Status: SHIPPED | OUTPATIENT
Start: 2023-11-02

## 2023-11-30 RX ORDER — ATORVASTATIN CALCIUM 40 MG/1
TABLET, FILM COATED ORAL
Qty: 90 TABLET | Refills: 1 | Status: SHIPPED | OUTPATIENT
Start: 2023-11-30

## 2023-12-13 RX ORDER — AMLODIPINE BESYLATE 5 MG/1
5 TABLET ORAL DAILY
Qty: 30 TABLET | Refills: 5 | Status: SHIPPED | OUTPATIENT
Start: 2023-12-13

## 2024-02-27 ENCOUNTER — OFFICE VISIT (OUTPATIENT)
Dept: CARDIOLOGY CLINIC | Age: 74
End: 2024-02-27
Payer: MEDICARE

## 2024-02-27 VITALS
OXYGEN SATURATION: 92 % | BODY MASS INDEX: 30.57 KG/M2 | SYSTOLIC BLOOD PRESSURE: 138 MMHG | DIASTOLIC BLOOD PRESSURE: 78 MMHG | WEIGHT: 190.2 LBS | HEIGHT: 66 IN | HEART RATE: 68 BPM

## 2024-02-27 DIAGNOSIS — E78.5 HYPERLIPIDEMIA, UNSPECIFIED HYPERLIPIDEMIA TYPE: ICD-10-CM

## 2024-02-27 DIAGNOSIS — Z87.891 FORMER SMOKER: ICD-10-CM

## 2024-02-27 DIAGNOSIS — I25.10 CORONARY ARTERY DISEASE INVOLVING NATIVE CORONARY ARTERY OF NATIVE HEART WITHOUT ANGINA PECTORIS: Primary | ICD-10-CM

## 2024-02-27 DIAGNOSIS — I10 PRIMARY HYPERTENSION: ICD-10-CM

## 2024-02-27 DIAGNOSIS — Z95.1 S/P CABG X 1: ICD-10-CM

## 2024-02-27 DIAGNOSIS — G25.81 RESTLESS LEG SYNDROME: ICD-10-CM

## 2024-02-27 PROCEDURE — G8427 DOCREV CUR MEDS BY ELIG CLIN: HCPCS | Performed by: INTERNAL MEDICINE

## 2024-02-27 PROCEDURE — G8484 FLU IMMUNIZE NO ADMIN: HCPCS | Performed by: INTERNAL MEDICINE

## 2024-02-27 PROCEDURE — G8417 CALC BMI ABV UP PARAM F/U: HCPCS | Performed by: INTERNAL MEDICINE

## 2024-02-27 PROCEDURE — 99214 OFFICE O/P EST MOD 30 MIN: CPT | Performed by: INTERNAL MEDICINE

## 2024-02-27 PROCEDURE — 3017F COLORECTAL CA SCREEN DOC REV: CPT | Performed by: INTERNAL MEDICINE

## 2024-02-27 PROCEDURE — 1123F ACP DISCUSS/DSCN MKR DOCD: CPT | Performed by: INTERNAL MEDICINE

## 2024-02-27 PROCEDURE — 93000 ELECTROCARDIOGRAM COMPLETE: CPT | Performed by: INTERNAL MEDICINE

## 2024-02-27 PROCEDURE — 3075F SYST BP GE 130 - 139MM HG: CPT | Performed by: INTERNAL MEDICINE

## 2024-02-27 PROCEDURE — 1036F TOBACCO NON-USER: CPT | Performed by: INTERNAL MEDICINE

## 2024-02-27 PROCEDURE — 3078F DIAST BP <80 MM HG: CPT | Performed by: INTERNAL MEDICINE

## 2024-02-27 NOTE — PATIENT INSTRUCTIONS
CORONARY ARTERY DISEASE:Yes, had CABG with .   CORONARY ARTERY BYPASS GRAFT   03-     x 1 - LIMA to LAD - Dr. Burks      clinically stable. Patient is on optimal medical regimen ( see medication list above )  - Patient is currently  asymptomatic from CAD.           - changes in  treatment:   no, on ASA           - Testing ordered: yes  Lawrence classification: 1   2/16/2022    Normal study.    Good exercise capacity. 10 METs work load.    Physiological BP response to exercise.    Normal perfusion study with normal distribution in all coronal, short, and    horizontal axis.    The observed defect is consistent with diaphragmatic attenuation.    Normal LV function. LVEF is 58 %.     EKG: NSR 59/min, first degree AV Block , non specific ST - T abnormalities    HTN:well controlled on current medical regimen, see list above.              - changes in  treatment:   no, On Amlodipine  ECHO 3/2019   Left ventricular systolic function is normal.   Ejection fraction is visually estimated at 50-55%.   Mild left ventricular hypertrophy.   Grade I diastolic dysfunction.   Mildly dilated left atrium.   No evidence of any pericardial effusion.      CARDIOMYOPATHY: None known   CONGESTIVE HEART FAILURE: NO KNOWN HISTORY.      VHD: No significant VHD noted     DYSLIPIDEMIA: Patient's profile is at / near [employer].yes,                                                               Tolerating current medical regimen wellyes,takes Lipitor & Zetia.                                                           See most recent Lab values in Labs section above.     TESTS ORDERED: Echo & stress Cardiolite.      PREVIOUSLY ORDERED TESTS REVIEWED & DISCUSSED WITH THE PATIENT:     I personally reviewed & interpreted, all previously ordered tests as copied above. Latest Labs are pulled in to the note with dates.   Labs, specially in Reference to Lipid profile, Cardiac testing in the form of Echo ( dated: ), stress tests ( dated: ) &

## 2024-02-27 NOTE — PROGRESS NOTES
OFFICE PROGRESS NOTES      Francisco is a 73 y.o. male who has    CHIEF COMPLAINT AS FOLLOWS:  CHEST PAIN:Patient  C/O chest discomfort without aggravating or relieving factors.      SOB:  C/O SOB with exertion.                 LEG EDEMA: No leg edema   PALPITATIONS: Denies any C/O Palpitations   DIZZINESS: No C/O Dizziness   SYNCOPE: None   OTHER/ ADDITIONAL COMPLAINTS:                                     HPI: Patient is here for F/U on his CAD, HTN & Dyslipidemia problems.   CAD: Patient has known CAD. Had CABG in the past.  HTN: Patient has known essential HTN. Has been treated with guideline recommended medical / physical/ diet therapy as stated below.  Dyslipidemia: Patient has known mixed dyslipidemia. Has been treated with guideline recommended medical / physical/ diet therapy as stated below.                Current Outpatient Medications   Medication Sig Dispense Refill    amLODIPine (NORVASC) 5 MG tablet TAKE ONE (1) TABLET BY MOUTH DAILY 30 tablet 5    atorvastatin (LIPITOR) 40 MG tablet TAKE 1 TABLET BY MOUTH ONCE DAILY 90 tablet 1    ezetimibe (ZETIA) 10 MG tablet Take 1 tablet by mouth daily 90 tablet 3    omeprazole (PRILOSEC) 40 MG delayed release capsule TAKE ONE (1) CAPSULE BY MOUTH ONCE DAILY 30 capsule 5    sertraline (ZOLOFT) 50 MG tablet TAKE 1 TABLET BY MOUTH ONCE DAILY 30 tablet 5    pramipexole (MIRAPEX) 0.25 MG tablet Take 1 tablet by mouth nightly 30 tablet 5    Multiple Vitamin (MULTIVITAMIN ADULT PO) Take by mouth      nitroGLYCERIN (NITROSTAT) 0.4 MG SL tablet Place 1 tablet under the tongue every 5 minutes as needed for Chest pain up to max of 3 total doses. If no relief after 1 dose, call 911. 25 tablet 3    aspirin 81 MG EC tablet Take 1 tablet by mouth daily       No current facility-administered medications for this visit.     Allergies: Patient has no known allergies.  Review of Systems:    Constitutional: Negative for diaphoresis and fatigue  Respiratory: Negative for shortness of

## 2024-03-11 RX ORDER — OMEPRAZOLE 40 MG/1
CAPSULE, DELAYED RELEASE ORAL
Qty: 30 CAPSULE | Refills: 5 | Status: SHIPPED | OUTPATIENT
Start: 2024-03-11

## 2024-03-14 ENCOUNTER — TELEPHONE (OUTPATIENT)
Dept: CARDIOLOGY CLINIC | Age: 74
End: 2024-03-14

## 2024-03-19 ENCOUNTER — TELEMEDICINE (OUTPATIENT)
Dept: FAMILY MEDICINE CLINIC | Age: 74
End: 2024-03-19
Payer: MEDICARE

## 2024-03-19 DIAGNOSIS — Z00.00 MEDICARE ANNUAL WELLNESS VISIT, SUBSEQUENT: Primary | ICD-10-CM

## 2024-03-19 PROCEDURE — G8484 FLU IMMUNIZE NO ADMIN: HCPCS | Performed by: FAMILY MEDICINE

## 2024-03-19 PROCEDURE — 1123F ACP DISCUSS/DSCN MKR DOCD: CPT | Performed by: FAMILY MEDICINE

## 2024-03-19 PROCEDURE — G0439 PPPS, SUBSEQ VISIT: HCPCS | Performed by: FAMILY MEDICINE

## 2024-03-19 PROCEDURE — 3017F COLORECTAL CA SCREEN DOC REV: CPT | Performed by: FAMILY MEDICINE

## 2024-03-19 ASSESSMENT — PATIENT HEALTH QUESTIONNAIRE - PHQ9
SUM OF ALL RESPONSES TO PHQ QUESTIONS 1-9: 0
SUM OF ALL RESPONSES TO PHQ QUESTIONS 1-9: 0
SUM OF ALL RESPONSES TO PHQ9 QUESTIONS 1 & 2: 0
SUM OF ALL RESPONSES TO PHQ QUESTIONS 1-9: 0
2. FEELING DOWN, DEPRESSED OR HOPELESS: NOT AT ALL
1. LITTLE INTEREST OR PLEASURE IN DOING THINGS: NOT AT ALL
SUM OF ALL RESPONSES TO PHQ QUESTIONS 1-9: 0

## 2024-03-19 ASSESSMENT — LIFESTYLE VARIABLES
HOW MANY STANDARD DRINKS CONTAINING ALCOHOL DO YOU HAVE ON A TYPICAL DAY: 1 OR 2
HOW OFTEN DO YOU HAVE A DRINK CONTAINING ALCOHOL: 2-3 TIMES A WEEK

## 2024-03-19 NOTE — PATIENT INSTRUCTIONS
Personalized Preventive Plan for Francisco Olvera - 3/19/2024  Medicare offers a range of preventive health benefits. Some of the tests and screenings are paid in full while other may be subject to a deductible, co-insurance, and/or copay.    Some of these benefits include a comprehensive review of your medical history including lifestyle, illnesses that may run in your family, and various assessments and screenings as appropriate.    After reviewing your medical record and screening and assessments performed today your provider may have ordered immunizations, labs, imaging, and/or referrals for you.  A list of these orders (if applicable) as well as your Preventive Care list are included within your After Visit Summary for your review.    Other Preventive Recommendations:    A preventive eye exam performed by an eye specialist is recommended every 1-2 years to screen for glaucoma; cataracts, macular degeneration, and other eye disorders.  A preventive dental visit is recommended every 6 months.  Try to get at least 150 minutes of exercise per week or 10,000 steps per day on a pedometer .  Order or download the FREE \"Exercise & Physical Activity: Your Everyday Guide\" from [employer]. Call 1-274.686.3175 or search [employer] online.  You need 8198-0846 mg of calcium and 8213-6118 IU of vitamin D per day. It is possible to meet your calcium requirement with diet alone, but a vitamin D supplement is usually necessary to meet this goal.  When exposed to the sun, use a sunscreen that protects against both UVA and UVB radiation with an SPF of 30 or greater. Reapply every 2 to 3 hours or after sweating, drying off with a towel, or swimming.  Always wear a seat belt when traveling in a car. Always wear a helmet when riding a bicycle or motorcycle.

## 2024-03-19 NOTE — PROGRESS NOTES
Medicare Annual Wellness Visit    Francisco Olvera is here for Medicare AWV    Assessment & Plan   Medicare annual wellness visit, subsequent  Recommendations for Preventive Services Due: see orders and patient instructions/AVS.  Recommended screening schedule for the next 5-10 years is provided to the patient in written form: see Patient Instructions/AVS.     No follow-ups on file.     Subjective       Patient's complete Health Risk Assessment and screening values have been reviewed and are found in Flowsheets. The following problems were reviewed today and where indicated follow up appointments were made and/or referrals ordered.    Positive Risk Factor Screenings with Interventions:    Fall Risk:  Do you feel unsteady or are you worried about falling? : no  2 or more falls in past year?: (!) yes  Fall with injury in past year?: no     Interventions:    Reviewed medications, home hazards, visual acuity, and co-morbidities that can increase risk for falls  Patient declines any further evaluation or treatment             Activity, Diet, and Weight:  On average, how many days per week do you engage in moderate to strenuous exercise (like a brisk walk)?: 5 days  On average, how many minutes do you engage in exercise at this level?: 20 min    Do you eat balanced/healthy meals regularly?: Yes    There is no height or weight on file to calculate BMI. (!) Abnormal    Obesity Interventions:  Patient declines any further evaluation or treatment              Vision Screen:  Do you have difficulty driving, watching TV, or doing any of your daily activities because of your eyesight?: No  Have you had an eye exam within the past year?: (!) No (needs)  No results found.    Interventions:   Patient comments: states he does need to make an eye appointment.  Patient encouraged to make appointment with their eye specialist    Safety:  Do you have working smoke detectors?: (!) No (needs)  Interventions:  Patient comments: patient states

## 2024-03-20 ENCOUNTER — OFFICE VISIT (OUTPATIENT)
Dept: CARDIOLOGY CLINIC | Age: 74
End: 2024-03-20
Payer: MEDICARE

## 2024-03-20 VITALS
HEIGHT: 66 IN | DIASTOLIC BLOOD PRESSURE: 72 MMHG | HEART RATE: 70 BPM | SYSTOLIC BLOOD PRESSURE: 136 MMHG | WEIGHT: 193.6 LBS | BODY MASS INDEX: 31.12 KG/M2

## 2024-03-20 DIAGNOSIS — G25.81 RESTLESS LEG SYNDROME: ICD-10-CM

## 2024-03-20 DIAGNOSIS — Z95.1 S/P CABG X 1: ICD-10-CM

## 2024-03-20 DIAGNOSIS — Z87.891 FORMER SMOKER: ICD-10-CM

## 2024-03-20 DIAGNOSIS — I25.10 CORONARY ARTERY DISEASE INVOLVING NATIVE CORONARY ARTERY OF NATIVE HEART WITHOUT ANGINA PECTORIS: Primary | ICD-10-CM

## 2024-03-20 DIAGNOSIS — E78.5 HYPERLIPIDEMIA, UNSPECIFIED HYPERLIPIDEMIA TYPE: ICD-10-CM

## 2024-03-20 DIAGNOSIS — I10 PRIMARY HYPERTENSION: ICD-10-CM

## 2024-03-20 PROCEDURE — G8484 FLU IMMUNIZE NO ADMIN: HCPCS | Performed by: INTERNAL MEDICINE

## 2024-03-20 PROCEDURE — 3075F SYST BP GE 130 - 139MM HG: CPT | Performed by: INTERNAL MEDICINE

## 2024-03-20 PROCEDURE — 3017F COLORECTAL CA SCREEN DOC REV: CPT | Performed by: INTERNAL MEDICINE

## 2024-03-20 PROCEDURE — G8427 DOCREV CUR MEDS BY ELIG CLIN: HCPCS | Performed by: INTERNAL MEDICINE

## 2024-03-20 PROCEDURE — 99213 OFFICE O/P EST LOW 20 MIN: CPT | Performed by: INTERNAL MEDICINE

## 2024-03-20 PROCEDURE — 3078F DIAST BP <80 MM HG: CPT | Performed by: INTERNAL MEDICINE

## 2024-03-20 PROCEDURE — G8417 CALC BMI ABV UP PARAM F/U: HCPCS | Performed by: INTERNAL MEDICINE

## 2024-03-20 PROCEDURE — 1123F ACP DISCUSS/DSCN MKR DOCD: CPT | Performed by: INTERNAL MEDICINE

## 2024-03-20 PROCEDURE — 1036F TOBACCO NON-USER: CPT | Performed by: INTERNAL MEDICINE

## 2024-03-20 RX ORDER — AMLODIPINE BESYLATE 2.5 MG/1
2.5 TABLET ORAL DAILY
Qty: 30 TABLET | Refills: 5 | Status: SHIPPED | OUTPATIENT
Start: 2024-03-20

## 2024-03-20 RX ORDER — AMLODIPINE BESYLATE 5 MG/1
5 TABLET ORAL DAILY
Qty: 30 TABLET | Refills: 5 | Status: SHIPPED | OUTPATIENT
Start: 2024-03-20

## 2024-03-20 NOTE — PROGRESS NOTES
OFFICE PROGRESS NOTES      Francisco is a 73 y.o. male who has    CHIEF COMPLAINT AS FOLLOWS:  CHEST PAIN: Patient  C/O chest discomfort without aggravating or relieving factors.      SOB:  C/O SOB with exertion.                 LEG EDEMA: No leg edema   PALPITATIONS: Denies any C/O Palpitations   DIZZINESS: No C/O Dizziness   SYNCOPE: None   OTHER/ ADDITIONAL COMPLAINTS:                                     HPI: Patient is here for F/U on his CAD,  HTN & Dyslipidemia problems.   CAD: Patient has known CAD. Had CABG in the past.  HTN: Patient has known essential HTN. Has been treated with guideline recommended medical / physical/ diet therapy as stated below.  Dyslipidemia: Patient has known mixed dyslipidemia. Has been treated with guideline recommended medical / physical/ diet therapy as stated below.                Current Outpatient Medications   Medication Sig Dispense Refill    omeprazole (PRILOSEC) 40 MG delayed release capsule TAKE ONE (1) CAPSULE BY MOUTH ONCE DAILY 30 capsule 5    amLODIPine (NORVASC) 5 MG tablet TAKE ONE (1) TABLET BY MOUTH DAILY 30 tablet 5    atorvastatin (LIPITOR) 40 MG tablet TAKE 1 TABLET BY MOUTH ONCE DAILY 90 tablet 1    ezetimibe (ZETIA) 10 MG tablet Take 1 tablet by mouth daily 90 tablet 3    sertraline (ZOLOFT) 50 MG tablet TAKE 1 TABLET BY MOUTH ONCE DAILY 30 tablet 5    pramipexole (MIRAPEX) 0.25 MG tablet Take 1 tablet by mouth nightly 30 tablet 5    Multiple Vitamin (MULTIVITAMIN ADULT PO) Take by mouth      nitroGLYCERIN (NITROSTAT) 0.4 MG SL tablet Place 1 tablet under the tongue every 5 minutes as needed for Chest pain up to max of 3 total doses. If no relief after 1 dose, call 911. 25 tablet 3    aspirin 81 MG EC tablet Take 1 tablet by mouth daily       No current facility-administered medications for this visit.     Allergies: Patient has no known allergies.  Review of Systems:    Constitutional: Negative for diaphoresis and fatigue  Respiratory: Negative for shortness of

## 2024-03-20 NOTE — PATIENT INSTRUCTIONS
CORONARY ARTERY DISEASE:Yes, had CABG with .   CORONARY ARTERY BYPASS GRAFT   03-     x 1 - LIMA to LAD - Dr. Burks       clinically stable. Patient is on optimal medical regimen ( see medication list above )  - Patient is currently  asymptomatic from CAD.           - changes in  treatment:   no, on ASA           - Testing ordered: yes  Pike classification: 1   2/16/2022    Normal study.    Good exercise capacity. 10 METs work load.    Physiological BP response to exercise.    Normal perfusion study with normal distribution in all coronal, short, and    horizontal axis.    The observed defect is consistent with diaphragmatic attenuation.    Normal LV function. LVEF is 58 %.      EKG: NSR 59/min, first degree AV Block , non specific ST - T abnormalities    3/13/2024  This is a normal perfusion stress Cardiolite study.     Patient baseline EKG reveals sinus bradycardia at 51 bpm and there is poor R wave progression noted.     Patient exercised for a total of 9 minutes on Nguyễn protocol achieving a total workload of 10 METS.  Exercise was terminated once a target heart rate was achieved.  Patient did not complain of any chest pain symptoms or EKG tracings did not reveal any diagnostic ischemic changes there were some PVCs noted with 1 couplet in recovery.  Mildly hypertensive blood pressure response to exercise noted maximum blood pressure was recorded at 170/90 mmHg.  Flores treadmill score is 9 suggesting low risk stress test.     Cardiolite perfusion imaging imaging was obtained in standard short axis, vertical and horizontal long axis views reveal normal Cardiolite uptake in all myocardial cardial territories indicating no significant epicardial coronary artery disease.Reverse redistribution noted in the anterior wall and possibly in the inferior wall also his probably due to technical artifact and images are worse in delayed imaging.  There are no findings suggestive of reversible ischemia or

## 2024-04-09 ENCOUNTER — OFFICE VISIT (OUTPATIENT)
Dept: FAMILY MEDICINE CLINIC | Age: 74
End: 2024-04-09
Payer: MEDICARE

## 2024-04-09 VITALS
WEIGHT: 190.5 LBS | RESPIRATION RATE: 18 BRPM | OXYGEN SATURATION: 94 % | HEART RATE: 58 BPM | HEIGHT: 66 IN | DIASTOLIC BLOOD PRESSURE: 70 MMHG | BODY MASS INDEX: 30.62 KG/M2 | SYSTOLIC BLOOD PRESSURE: 130 MMHG

## 2024-04-09 DIAGNOSIS — I10 PRIMARY HYPERTENSION: Primary | ICD-10-CM

## 2024-04-09 DIAGNOSIS — J42 CHRONIC BRONCHITIS, UNSPECIFIED CHRONIC BRONCHITIS TYPE (HCC): ICD-10-CM

## 2024-04-09 DIAGNOSIS — R73.9 HYPERGLYCEMIA: ICD-10-CM

## 2024-04-09 DIAGNOSIS — Z12.5 PROSTATE CANCER SCREENING: ICD-10-CM

## 2024-04-09 DIAGNOSIS — K21.9 GASTROESOPHAGEAL REFLUX DISEASE WITHOUT ESOPHAGITIS: ICD-10-CM

## 2024-04-09 DIAGNOSIS — I25.10 CORONARY ARTERY DISEASE INVOLVING NATIVE CORONARY ARTERY OF NATIVE HEART WITHOUT ANGINA PECTORIS: ICD-10-CM

## 2024-04-09 DIAGNOSIS — F41.9 ANXIETY: ICD-10-CM

## 2024-04-09 PROCEDURE — 1123F ACP DISCUSS/DSCN MKR DOCD: CPT | Performed by: FAMILY MEDICINE

## 2024-04-09 PROCEDURE — 1036F TOBACCO NON-USER: CPT | Performed by: FAMILY MEDICINE

## 2024-04-09 PROCEDURE — G8417 CALC BMI ABV UP PARAM F/U: HCPCS | Performed by: FAMILY MEDICINE

## 2024-04-09 PROCEDURE — 99214 OFFICE O/P EST MOD 30 MIN: CPT | Performed by: FAMILY MEDICINE

## 2024-04-09 PROCEDURE — 3017F COLORECTAL CA SCREEN DOC REV: CPT | Performed by: FAMILY MEDICINE

## 2024-04-09 PROCEDURE — 3023F SPIROM DOC REV: CPT | Performed by: FAMILY MEDICINE

## 2024-04-09 PROCEDURE — G8427 DOCREV CUR MEDS BY ELIG CLIN: HCPCS | Performed by: FAMILY MEDICINE

## 2024-04-09 PROCEDURE — 3075F SYST BP GE 130 - 139MM HG: CPT | Performed by: FAMILY MEDICINE

## 2024-04-09 PROCEDURE — 3078F DIAST BP <80 MM HG: CPT | Performed by: FAMILY MEDICINE

## 2024-04-09 RX ORDER — BENZONATATE 100 MG/1
100 CAPSULE ORAL 3 TIMES DAILY PRN
COMMUNITY
Start: 2023-12-20

## 2024-04-09 RX ORDER — ALBUTEROL SULFATE 90 UG/1
2 AEROSOL, METERED RESPIRATORY (INHALATION) EVERY 6 HOURS PRN
COMMUNITY
Start: 2023-12-20

## 2024-04-09 ASSESSMENT — ENCOUNTER SYMPTOMS
DIARRHEA: 0
ABDOMINAL PAIN: 0
SHORTNESS OF BREATH: 0
BLOOD IN STOOL: 0
TROUBLE SWALLOWING: 0
NAUSEA: 0
WHEEZING: 0
CHEST TIGHTNESS: 0
VOMITING: 0
EYE PAIN: 0

## 2024-04-09 NOTE — PROGRESS NOTES
status is at baseline.      Cranial Nerves: No cranial nerve deficit.      Motor: No weakness.   Psychiatric:         Mood and Affect: Mood normal.         Behavior: Behavior normal.         Thought Content: Thought content normal.         ASSESSMENT & PLAN     Diagnosis Orders   1. Primary hypertension  CBC    Comprehensive Metabolic Panel      2. Chronic bronchitis, unspecified chronic bronchitis type (HCC)        3. Coronary artery disease involving native coronary artery of native heart without angina pectoris  LIPID PANEL      4. Anxiety        5. Gastroesophageal reflux disease without esophagitis        6. Prostate cancer screening  PSA Screening      7. Hyperglycemia  Hemoglobin A1C      Encouraged to continue to monitor pressures closely at home and follow-up as directed with cardiology.  Check CMP, lipid panel, PSA, CBC, and A1c and 2 to 3 months.  Okay to finish taper and DC of sertraline let me know how his anxiety is doing once been off for few weeks especially if having any problems.  Call with other changes or problems.  Patient to consider RSV and latest COVID shot per CDC recommendations.  Does need Tdap this year also.    Return in about 6 months (around 10/9/2024), or if symptoms worsen or fail to improve.         Electronically signed by AJITH CARTY MD on 4/9/2024

## 2024-05-20 ENCOUNTER — OFFICE VISIT (OUTPATIENT)
Dept: ONCOLOGY | Age: 74
End: 2024-05-20
Payer: MEDICARE

## 2024-05-20 ENCOUNTER — HOSPITAL ENCOUNTER (OUTPATIENT)
Dept: INFUSION THERAPY | Age: 74
Discharge: HOME | End: 2024-05-20
Payer: MEDICARE

## 2024-05-20 VITALS
WEIGHT: 191.4 LBS | HEART RATE: 62 BPM | SYSTOLIC BLOOD PRESSURE: 148 MMHG | OXYGEN SATURATION: 94 % | DIASTOLIC BLOOD PRESSURE: 79 MMHG | TEMPERATURE: 98.2 F | HEIGHT: 66 IN | BODY MASS INDEX: 30.76 KG/M2

## 2024-05-20 DIAGNOSIS — I10 PRIMARY HYPERTENSION: ICD-10-CM

## 2024-05-20 DIAGNOSIS — Z12.5 PROSTATE CANCER SCREENING: ICD-10-CM

## 2024-05-20 DIAGNOSIS — R73.9 HYPERGLYCEMIA: ICD-10-CM

## 2024-05-20 DIAGNOSIS — I25.10 CORONARY ARTERY DISEASE INVOLVING NATIVE CORONARY ARTERY OF NATIVE HEART WITHOUT ANGINA PECTORIS: ICD-10-CM

## 2024-05-20 DIAGNOSIS — R93.7 ABNORMAL FINDINGS ON DIAGNOSTIC IMAGING OF MUSCULOSKELETAL SYSTEM: Primary | ICD-10-CM

## 2024-05-20 LAB
ALBUMIN SERPL-MCNC: 4.1 G/DL (ref 3.4–5)
ALBUMIN/GLOB SERPL: 1.4 {RATIO} (ref 1.1–2.2)
ALP SERPL-CCNC: 64 U/L (ref 40–129)
ALT SERPL-CCNC: 34 U/L (ref 10–40)
ANION GAP SERPL CALCULATED.3IONS-SCNC: 13 MMOL/L (ref 3–16)
AST SERPL-CCNC: 23 U/L (ref 15–37)
BILIRUB SERPL-MCNC: 0.8 MG/DL (ref 0–1)
BUN SERPL-MCNC: 14 MG/DL (ref 7–20)
CALCIUM SERPL-MCNC: 9.3 MG/DL (ref 8.3–10.6)
CHLORIDE SERPL-SCNC: 105 MMOL/L (ref 99–110)
CHOLEST SERPL-MCNC: 118 MG/DL (ref 0–199)
CO2 SERPL-SCNC: 23 MMOL/L (ref 21–32)
CREAT SERPL-MCNC: 0.8 MG/DL (ref 0.8–1.3)
DEPRECATED RDW RBC AUTO: 13.5 % (ref 12.4–15.4)
GFR SERPLBLD CREATININE-BSD FMLA CKD-EPI: >90 ML/MIN/{1.73_M2}
GLUCOSE SERPL-MCNC: 111 MG/DL (ref 70–99)
HCT VFR BLD AUTO: 45.8 % (ref 40.5–52.5)
HDLC SERPL-MCNC: 48 MG/DL (ref 40–60)
HGB BLD-MCNC: 15.8 G/DL (ref 13.5–17.5)
LDLC SERPL CALC-MCNC: 50 MG/DL
MCH RBC QN AUTO: 29.5 PG (ref 26–34)
MCHC RBC AUTO-ENTMCNC: 34.5 G/DL (ref 31–36)
MCV RBC AUTO: 85.4 FL (ref 80–100)
PLATELET # BLD AUTO: 182 K/UL (ref 135–450)
PMV BLD AUTO: 9.3 FL (ref 5–10.5)
POTASSIUM SERPL-SCNC: 4.2 MMOL/L (ref 3.5–5.1)
PROT SERPL-MCNC: 7.1 G/DL (ref 6.4–8.2)
PSA SERPL DL<=0.01 NG/ML-MCNC: 1.21 NG/ML (ref 0–4)
RBC # BLD AUTO: 5.36 M/UL (ref 4.2–5.9)
SODIUM SERPL-SCNC: 141 MMOL/L (ref 136–145)
TRIGL SERPL-MCNC: 102 MG/DL (ref 0–150)
VLDLC SERPL CALC-MCNC: 20 MG/DL
WBC # BLD AUTO: 5.8 K/UL (ref 4–11)

## 2024-05-20 PROCEDURE — G8417 CALC BMI ABV UP PARAM F/U: HCPCS | Performed by: INTERNAL MEDICINE

## 2024-05-20 PROCEDURE — 1123F ACP DISCUSS/DSCN MKR DOCD: CPT | Performed by: INTERNAL MEDICINE

## 2024-05-20 PROCEDURE — 99213 OFFICE O/P EST LOW 20 MIN: CPT | Performed by: INTERNAL MEDICINE

## 2024-05-20 PROCEDURE — 3077F SYST BP >= 140 MM HG: CPT | Performed by: INTERNAL MEDICINE

## 2024-05-20 PROCEDURE — 3017F COLORECTAL CA SCREEN DOC REV: CPT | Performed by: INTERNAL MEDICINE

## 2024-05-20 PROCEDURE — 1036F TOBACCO NON-USER: CPT | Performed by: INTERNAL MEDICINE

## 2024-05-20 PROCEDURE — 3078F DIAST BP <80 MM HG: CPT | Performed by: INTERNAL MEDICINE

## 2024-05-20 PROCEDURE — G8427 DOCREV CUR MEDS BY ELIG CLIN: HCPCS | Performed by: INTERNAL MEDICINE

## 2024-05-20 PROCEDURE — 99211 OFF/OP EST MAY X REQ PHY/QHP: CPT

## 2024-05-20 ASSESSMENT — PATIENT HEALTH QUESTIONNAIRE - PHQ9
2. FEELING DOWN, DEPRESSED OR HOPELESS: NOT AT ALL
SUM OF ALL RESPONSES TO PHQ QUESTIONS 1-9: 0
SUM OF ALL RESPONSES TO PHQ QUESTIONS 1-9: 0
SUM OF ALL RESPONSES TO PHQ9 QUESTIONS 1 & 2: 0
SUM OF ALL RESPONSES TO PHQ QUESTIONS 1-9: 0
1. LITTLE INTEREST OR PLEASURE IN DOING THINGS: NOT AT ALL
SUM OF ALL RESPONSES TO PHQ QUESTIONS 1-9: 0

## 2024-05-20 NOTE — PROGRESS NOTES
MA Rooming Questions  Patient: Francisco Olvera  MRN: [id number]    Date: 5/20/2024        1. Do you have any new issues?   no         2. Do you need any refills on medications?    no    3. Have you had any imaging done since your last visit?   Yes, MRI Cat scan.     4. Have you been hospitalized or seen in the emergency room since your last visit here?   no    5. Did the patient have a depression screening completed today? Yes    PHQ-9 Total Score: 0 (5/20/2024  3:42 PM)       PHQ-9 Given to (if applicable):               PHQ-9 Score (if applicable):                     [] Positive     []  Negative              Does question #9 need addressed (if applicable)                     [] Yes    []  No               Monik Marley MA      
2.7 05/28/2021     Lab Results   Component Value Date    TSHHS 3.950 03/14/2019     Immunology:  Lab Results   Component Value Date    SPEP INTERPRETATION - NEGATIVE FOR MONOCLONAL BANDS. MM 03/19/2019    ALBUMINELP 2.8 (L) 03/19/2019    LABALPH 0.3 03/19/2019    LABALPH 0.9 03/19/2019    GAMGLOB 1.1 03/19/2019     Lab Results   Component Value Date    KAPPAUVOL 1.82 03/19/2019    LAMBDAUVOL 1.51 03/19/2019    KLFLCR 1.21 03/19/2019    KLFLCR  03/19/2019     (NOTE)  Performed by [facility],  [address] [place] [address],[place] [address] [phone]  [email or web address], Sj Parra MD, Lab. Director       Coagulation Panel:  Lab Results   Component Value Date    PROTIME 11.4 07/05/2019    INR 1.00 07/05/2019    APTT 25.2 07/05/2019     Tumor Markers:  Lab Results   Component Value Date    PSA 0.77 06/13/2023      Observations:  PHQ-9 Total Score: 0 (5/20/2024  3:42 PM)      Assessment & Plan:  1. He has a biopsy of the T-8 vertebral body in March 2019 which was negative. MRI of T-spine was reviewed. 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 Active surveillance was recomemended.  CT and MRI of thoracic spine in November 2019 at [facility] were reviewed.   MRI of spine in April 2020 was reviewed.  CT and MRI of the spine in November 2020 showed stable findings.  Follow-up MRI of the spine in May 2021 was stable.  MRI of thoracic spine in April 2022 was reviewed.  Neurosurgeon at [facility] recommended follow-up MRI in 2 years.    He was seen by spine clinic at [facility] in 5/2024 who recommended RTC in 2 years with MRI/CT T spine   He is active. He has recent labs by PCP.  He is agreeable to FU in 2 years after MRI.    2. He has elevated PSA which could be related to prostatitis.  He had PSA  on December 7, 2020.  He has been seen by urologist.    3. I advised him to keep age-appropriate cancer screening up-to-date.     4. He saw dermatologist on July 23, 2019.    We

## 2024-05-21 LAB
EST. AVERAGE GLUCOSE BLD GHB EST-MCNC: 131.2 MG/DL
HBA1C MFR BLD: 6.2 %

## 2024-06-20 RX ORDER — AMLODIPINE BESYLATE 5 MG/1
5 TABLET ORAL DAILY
Qty: 90 TABLET | Refills: 1 | Status: SHIPPED | OUTPATIENT
Start: 2024-06-20

## 2024-08-06 RX ORDER — ATORVASTATIN CALCIUM 40 MG/1
40 TABLET, FILM COATED ORAL DAILY
Qty: 90 TABLET | Refills: 0 | Status: SHIPPED | OUTPATIENT
Start: 2024-08-06

## 2024-08-06 NOTE — TELEPHONE ENCOUNTER
Appt 9/24/24  Lv 4/9/24    Requested Prescriptions     Signed Prescriptions Disp Refills    atorvastatin (LIPITOR) 40 MG tablet 90 tablet 0     Sig: Take 1 tablet by mouth daily     Authorizing Provider: AJITH CARTY     Ordering User: SARA HIGGINS

## 2024-09-12 RX ORDER — OMEPRAZOLE 40 MG/1
CAPSULE, DELAYED RELEASE ORAL
Qty: 30 CAPSULE | Refills: 5 | Status: SHIPPED | OUTPATIENT
Start: 2024-09-12

## 2024-09-12 RX ORDER — ATORVASTATIN CALCIUM 40 MG/1
40 TABLET, FILM COATED ORAL DAILY
Qty: 90 TABLET | Refills: 0 | Status: SHIPPED | OUTPATIENT
Start: 2024-09-12

## 2024-10-03 RX ORDER — AMLODIPINE BESYLATE 5 MG/1
5 TABLET ORAL DAILY
Qty: 90 TABLET | Refills: 1 | Status: SHIPPED | OUTPATIENT
Start: 2024-10-03

## 2024-10-09 ENCOUNTER — OFFICE VISIT (OUTPATIENT)
Dept: FAMILY MEDICINE CLINIC | Age: 74
End: 2024-10-09

## 2024-10-09 VITALS
BODY MASS INDEX: 30.68 KG/M2 | RESPIRATION RATE: 20 BRPM | OXYGEN SATURATION: 96 % | DIASTOLIC BLOOD PRESSURE: 70 MMHG | HEART RATE: 60 BPM | WEIGHT: 190.9 LBS | HEIGHT: 66 IN | SYSTOLIC BLOOD PRESSURE: 140 MMHG

## 2024-10-09 DIAGNOSIS — I25.10 CORONARY ARTERY DISEASE INVOLVING NATIVE CORONARY ARTERY OF NATIVE HEART WITHOUT ANGINA PECTORIS: Primary | ICD-10-CM

## 2024-10-09 DIAGNOSIS — G25.81 RESTLESS LEG SYNDROME: ICD-10-CM

## 2024-10-09 DIAGNOSIS — D49.9 TUMOR: ICD-10-CM

## 2024-10-09 DIAGNOSIS — R73.9 HYPERGLYCEMIA: ICD-10-CM

## 2024-10-09 DIAGNOSIS — I10 PRIMARY HYPERTENSION: ICD-10-CM

## 2024-10-09 DIAGNOSIS — F41.9 ANXIETY: ICD-10-CM

## 2024-10-09 DIAGNOSIS — J42 CHRONIC BRONCHITIS, UNSPECIFIED CHRONIC BRONCHITIS TYPE (HCC): ICD-10-CM

## 2024-10-09 DIAGNOSIS — R41.3 MEMORY CHANGE: ICD-10-CM

## 2024-10-09 RX ORDER — ATORVASTATIN CALCIUM 40 MG/1
40 TABLET, FILM COATED ORAL DAILY
Qty: 90 TABLET | Refills: 0 | Status: SHIPPED | OUTPATIENT
Start: 2024-10-09

## 2024-10-09 SDOH — ECONOMIC STABILITY: INCOME INSECURITY: HOW HARD IS IT FOR YOU TO PAY FOR THE VERY BASICS LIKE FOOD, HOUSING, MEDICAL CARE, AND HEATING?: SOMEWHAT HARD

## 2024-10-09 SDOH — ECONOMIC STABILITY: FOOD INSECURITY: WITHIN THE PAST 12 MONTHS, YOU WORRIED THAT YOUR FOOD WOULD RUN OUT BEFORE YOU GOT MONEY TO BUY MORE.: NEVER TRUE

## 2024-10-09 SDOH — ECONOMIC STABILITY: FOOD INSECURITY: WITHIN THE PAST 12 MONTHS, THE FOOD YOU BOUGHT JUST DIDN'T LAST AND YOU DIDN'T HAVE MONEY TO GET MORE.: NEVER TRUE

## 2024-10-09 ASSESSMENT — ENCOUNTER SYMPTOMS
NAUSEA: 0
BLOOD IN STOOL: 0
VOMITING: 0
TROUBLE SWALLOWING: 0
CHEST TIGHTNESS: 0
SHORTNESS OF BREATH: 0
ABDOMINAL PAIN: 0
EYE PAIN: 0
WHEEZING: 0
DIARRHEA: 0

## 2024-10-16 RX ORDER — AMLODIPINE BESYLATE 2.5 MG/1
2.5 TABLET ORAL DAILY
Qty: 90 TABLET | Refills: 1 | Status: SHIPPED | OUTPATIENT
Start: 2024-10-16

## 2024-10-22 ENCOUNTER — OFFICE VISIT (OUTPATIENT)
Dept: CARDIOLOGY CLINIC | Age: 74
End: 2024-10-22

## 2024-10-22 VITALS
DIASTOLIC BLOOD PRESSURE: 70 MMHG | BODY MASS INDEX: 30.82 KG/M2 | HEART RATE: 61 BPM | WEIGHT: 191.8 LBS | HEIGHT: 66 IN | SYSTOLIC BLOOD PRESSURE: 128 MMHG

## 2024-10-22 DIAGNOSIS — G25.81 RESTLESS LEG SYNDROME: ICD-10-CM

## 2024-10-22 DIAGNOSIS — I10 PRIMARY HYPERTENSION: ICD-10-CM

## 2024-10-22 DIAGNOSIS — J42 CHRONIC BRONCHITIS, UNSPECIFIED CHRONIC BRONCHITIS TYPE (HCC): ICD-10-CM

## 2024-10-22 DIAGNOSIS — Z95.1 S/P CABG X 1: ICD-10-CM

## 2024-10-22 DIAGNOSIS — I25.10 CORONARY ARTERY DISEASE INVOLVING NATIVE CORONARY ARTERY OF NATIVE HEART WITHOUT ANGINA PECTORIS: Primary | ICD-10-CM

## 2024-10-22 DIAGNOSIS — Z87.891 FORMER SMOKER: ICD-10-CM

## 2024-10-22 DIAGNOSIS — E78.5 HYPERLIPIDEMIA, UNSPECIFIED HYPERLIPIDEMIA TYPE: ICD-10-CM

## 2024-10-22 NOTE — PROGRESS NOTES
OFFICE PROGRESS NOTES      Francisco is a 73 y.o. male who has    CHIEF COMPLAINT AS FOLLOWS:  CHEST PAIN: Patient  C/O chest discomfort without aggravating or relieving factors.      SOB:  C/O SOB with exertion. Has COPD.                LEG EDEMA: No leg edema   PALPITATIONS: Denies any C/O Palpitations   DIZZINESS: No C/O Dizziness   SYNCOPE: None   OTHER/ ADDITIONAL COMPLAINTS:                                     HPI: Patient is here for F/U on his CAD, HTN & Dyslipidemia problems.   CAD: Patient has known CAD. Had CABG in the past.  HTN: Patient has known essential HTN. Has been treated with guideline recommended medical / physical/ diet therapy as stated below.  Dyslipidemia: Patient has known mixed dyslipidemia. Has been treated with guideline recommended medical / physical/ diet therapy as stated below.                Current Outpatient Medications   Medication Sig Dispense Refill    amLODIPine (NORVASC) 2.5 MG tablet Take 1 tablet by mouth daily 90 tablet 1    atorvastatin (LIPITOR) 40 MG tablet Take 1 tablet by mouth daily 90 tablet 0    amLODIPine (NORVASC) 5 MG tablet Take 1 tablet by mouth daily 90 tablet 1    omeprazole (PRILOSEC) 40 MG delayed release capsule TAKE ONE (1) CAPSULE BY MOUTH ONCE DAILY 30 capsule 5    sertraline (ZOLOFT) 50 MG tablet TAKE ONE (1) TABLET BY MOUTH  ONCE DAILY 30 tablet 5    ezetimibe (ZETIA) 10 MG tablet Take 1 tablet by mouth daily 90 tablet 3    pramipexole (MIRAPEX) 0.25 MG tablet Take 1 tablet by mouth nightly 30 tablet 5    Multiple Vitamin (MULTIVITAMIN ADULT PO) Take by mouth      nitroGLYCERIN (NITROSTAT) 0.4 MG SL tablet Place 1 tablet under the tongue every 5 minutes as needed for Chest pain up to max of 3 total doses. If no relief after 1 dose, call 911. 25 tablet 3    aspirin 81 MG EC tablet Take 1 tablet by mouth daily       No current facility-administered medications for this visit.     Allergies: Patient has no known allergies.  Review of Systems:

## 2024-10-22 NOTE — PATIENT INSTRUCTIONS
CORONARY ARTERY DISEASE:Yes, had CABG with .   CORONARY ARTERY BYPASS GRAFT   03-     x 1 - LIMA to LAD - Dr. Burks       clinically stable. Patient is on optimal medical regimen ( see medication list above )  - Patient is currently  asymptomatic from CAD.           - changes in  treatment:   no, on ASA           - Testing ordered: yes  [place] classification: 1   2/16/2022    Normal study.    Good exercise capacity. 10 METs work load.    Physiological BP response to exercise.    Normal perfusion study with normal distribution in all coronal, short, and    horizontal axis.    The observed defect is consistent with diaphragmatic attenuation.    Normal LV function. LVEF is 58 %.      EKG: NSR 59/min, first degree AV Block , non specific ST - T abnormalities     3/13/2024  This is a normal perfusion stress Cardiolite study.     Patient baseline EKG reveals sinus bradycardia at 51 bpm and there is poor R wave progression noted.     Patient exercised for a total of 9 minutes on Nguyễn protocol achieving a total workload of 10 METS.  Exercise was terminated once a target heart rate was achieved.  Patient did not complain of any chest pain symptoms or EKG tracings did not reveal any diagnostic ischemic changes there were some PVCs noted with 1 couplet in recovery.  Mildly hypertensive blood pressure response to exercise noted maximum blood pressure was recorded at 170/90 mmHg.  Flores treadmill score is 9 suggesting low risk stress test.     Cardiolite perfusion imaging imaging was obtained in standard short axis, vertical and horizontal long axis views reveal normal Cardiolite uptake in all myocardial cardial territories indicating no significant epicardial coronary artery disease.Reverse redistribution noted in the anterior wall and possibly in the inferior wall also his probably due to technical artifact and images are worse in delayed imaging.  There are no findings suggestive of reversible ischemia or

## 2024-11-01 RX ORDER — EZETIMIBE 10 MG/1
10 TABLET ORAL DAILY
Qty: 90 TABLET | Refills: 1 | Status: SHIPPED | OUTPATIENT
Start: 2024-11-01

## 2024-11-02 DIAGNOSIS — G25.81 RESTLESS LEG SYNDROME: ICD-10-CM

## 2024-11-04 RX ORDER — PRAMIPEXOLE DIHYDROCHLORIDE 0.25 MG/1
0.25 TABLET ORAL NIGHTLY
Qty: 30 TABLET | Refills: 5 | Status: SHIPPED | OUTPATIENT
Start: 2024-11-04

## 2024-12-03 ENCOUNTER — OFFICE VISIT (OUTPATIENT)
Dept: FAMILY MEDICINE CLINIC | Age: 74
End: 2024-12-03

## 2024-12-03 VITALS
BODY MASS INDEX: 30.95 KG/M2 | OXYGEN SATURATION: 98 % | DIASTOLIC BLOOD PRESSURE: 62 MMHG | SYSTOLIC BLOOD PRESSURE: 132 MMHG | HEIGHT: 66 IN | WEIGHT: 192.6 LBS | HEART RATE: 57 BPM

## 2024-12-03 DIAGNOSIS — N40.0 BENIGN PROSTATIC HYPERPLASIA WITHOUT LOWER URINARY TRACT SYMPTOMS: ICD-10-CM

## 2024-12-03 DIAGNOSIS — R73.9 CHRONIC HYPERGLYCEMIA: ICD-10-CM

## 2024-12-03 DIAGNOSIS — N20.0 NEPHROLITHIASIS: Primary | ICD-10-CM

## 2024-12-03 RX ORDER — TAMSULOSIN HYDROCHLORIDE 0.4 MG/1
CAPSULE ORAL
COMMUNITY
Start: 2024-12-01 | End: 2024-12-03 | Stop reason: SDUPTHER

## 2024-12-03 RX ORDER — TAMSULOSIN HYDROCHLORIDE 0.4 MG/1
0.4 CAPSULE ORAL DAILY
Qty: 90 CAPSULE | Refills: 1 | Status: SHIPPED | OUTPATIENT
Start: 2024-12-03

## 2024-12-03 ASSESSMENT — ENCOUNTER SYMPTOMS: RESPIRATORY NEGATIVE: 1

## 2024-12-03 NOTE — PROGRESS NOTES
12/3/2024    Francisco Olvera    Chief Complaint   Patient presents with    Follow-Up from Hospital     Pt states to thought he had a kidney infection. Hospital said there was a small kidney stone was given flomax.       HPI  History was obtained from patient.  Francisco is a pleasant 74 y.o. male who presents today for ED follow up.     Presented to [facility] ED with left flank pain, nephrolithiasis, prostate enlargement and hyperglycemia.    Recommended outpatient follow-up with urology regarding prostate enlargement, he was started on Flomax.    CT abdomen and pelvis showed:  1.  No acute intra-abdominal/pelvic findings.  2.  Nonobstructive punctate left nephrolithiasis. No discrete ureteral calculi are identified.  3.  Prostatomegaly. Mild urinary bladder wall thickening may relate to chronic bladder outlet obstruction versus underdistention.  4.  Partially visualized expansile mixed lytic and sclerotic lesion involving the T8 pedicles, transverse processes, and spinous process. This was previously detailed on outside imaging reports dated 5/2/2024.    PSA was normal in May. He reports that he is tolerating the Fosamax well without side effects. Left flank pain persists but does not seem to be as bad. He did report urinary frequency before this episode and this has resolved with Flomax.     He has a history of pre-diabetes with A1C in May of 6.2 which has been his average for 5 years.     1. Nephrolithiasis    2. Benign prostatic hyperplasia without lower urinary tract symptoms    3. Chronic hyperglycemia       REVIEW OF SYMPTOMS    Review of Systems   Respiratory: Negative.     Cardiovascular: Negative.    Genitourinary:  Positive for flank pain. Negative for decreased urine volume, difficulty urinating, dysuria, hematuria and urgency.       No data recorded    The ASCVD Risk score ([place] DK, et al., 2019) failed to calculate for the following reasons:    The valid total cholesterol range is 130 to 320 mg/dL    Last

## 2024-12-20 ENCOUNTER — OFFICE VISIT (OUTPATIENT)
Dept: FAMILY MEDICINE CLINIC | Age: 74
End: 2024-12-20

## 2024-12-20 VITALS
WEIGHT: 194 LBS | HEART RATE: 76 BPM | OXYGEN SATURATION: 97 % | SYSTOLIC BLOOD PRESSURE: 130 MMHG | DIASTOLIC BLOOD PRESSURE: 78 MMHG | HEIGHT: 66 IN | BODY MASS INDEX: 31.18 KG/M2

## 2024-12-20 DIAGNOSIS — I10 PRIMARY HYPERTENSION: ICD-10-CM

## 2024-12-20 DIAGNOSIS — R04.0 EPISTAXIS: Primary | ICD-10-CM

## 2024-12-20 NOTE — PROGRESS NOTES
no symptoms and no a fib or arrhythmia noted    History of complete ECG 04-    Koi (hard of hearing)     noé hearing aides    HTN (hypertension)     Hx of cardiovascular stress test 10/28/2015    cardiolite-mild ischemia apical,EF64%    Hx of echocardiogram 6/9/2016    EF 55-60%. LA is mildly dilated. RV is mildly dilated. Mild MR. Mild tricuspid insufficiency. Significant VHD is absent.     Hyperlipidemia     Narcolepsy     Nocturnal hypoxemia 4/5/2019    Obstructive sleep apnea 04/05/2019    \"had sleep study done and they said I do not have sleep apnea, put me on oxygen at night \"    On home oxygen therapy     2l/nc at night only    Periodic limb movements of sleep 5/30/2019    Polyp of colon     Reflux     RLS (restless legs syndrome)     S/P CABG x 1 03-    Stricture of esophagus     S/P Dilation (Dr. Yuan)    UTI (urinary tract infection) 04/2019    Wears glasses        Past Surgical History:  Past Surgical History:   Procedure Laterality Date    BONE BIOPSY      per old chart bx T 8 3/2019    BONE BIOPSY N/A 07/05/2019    IR T-8    COLONOSCOPY  last one 2016 2008    COLONOSCOPY N/A 7/29/2021    COLONOSCOPY POLYPECTOMY SNARE/COLD BIOPSY performed by Axel Yuan MD at [facility] OR    CORONARY ARTERY BYPASS GRAFT  03-    x 1 - LIMA to LAD - Dr. Burks    CT BIOPSY DEEP BONE PERCUTANEOUS  8/7/2019    CT BIOPSY PERCUTANEOUS DEEP BONE 8/7/2019    ROTATOR CUFF REPAIR Right 2019    TONSILLECTOMY  as a kid    VASECTOMY  1976       Home Medications:  Current Outpatient Medications   Medication Sig Dispense Refill    tamsulosin (FLOMAX) 0.4 MG capsule Take 1 capsule by mouth daily 90 capsule 1    pramipexole (MIRAPEX) 0.25 MG tablet Take 1 tablet by mouth nightly 30 tablet 5    ezetimibe (ZETIA) 10 MG tablet Take 1 tablet by mouth daily 90 tablet 1    amLODIPine (NORVASC) 2.5 MG tablet Take 1 tablet by mouth daily 90 tablet 1    atorvastatin (LIPITOR) 40 MG tablet Take 1 tablet by mouth

## 2024-12-26 RX ORDER — AMLODIPINE BESYLATE 5 MG/1
5 TABLET ORAL DAILY
Qty: 90 TABLET | Refills: 1 | Status: SHIPPED | OUTPATIENT
Start: 2024-12-26

## 2025-01-29 RX ORDER — ATORVASTATIN CALCIUM 40 MG/1
40 TABLET, FILM COATED ORAL DAILY
Qty: 90 TABLET | Refills: 0 | Status: SHIPPED | OUTPATIENT
Start: 2025-01-29

## 2025-02-05 ENCOUNTER — OFFICE VISIT (OUTPATIENT)
Dept: FAMILY MEDICINE CLINIC | Age: 75
End: 2025-02-05
Payer: MEDICARE

## 2025-02-05 VITALS
WEIGHT: 191 LBS | BODY MASS INDEX: 30.7 KG/M2 | TEMPERATURE: 97.2 F | DIASTOLIC BLOOD PRESSURE: 66 MMHG | HEART RATE: 90 BPM | SYSTOLIC BLOOD PRESSURE: 110 MMHG | HEIGHT: 66 IN | OXYGEN SATURATION: 94 %

## 2025-02-05 DIAGNOSIS — Z20.828 EXPOSURE TO INFLUENZA: ICD-10-CM

## 2025-02-05 DIAGNOSIS — R68.89 FLU-LIKE SYMPTOMS: Primary | ICD-10-CM

## 2025-02-05 PROCEDURE — 1160F RVW MEDS BY RX/DR IN RCRD: CPT | Performed by: PHYSICIAN ASSISTANT

## 2025-02-05 PROCEDURE — 99213 OFFICE O/P EST LOW 20 MIN: CPT | Performed by: PHYSICIAN ASSISTANT

## 2025-02-05 PROCEDURE — 3074F SYST BP LT 130 MM HG: CPT | Performed by: PHYSICIAN ASSISTANT

## 2025-02-05 PROCEDURE — 1159F MED LIST DOCD IN RCRD: CPT | Performed by: PHYSICIAN ASSISTANT

## 2025-02-05 PROCEDURE — G8417 CALC BMI ABV UP PARAM F/U: HCPCS | Performed by: PHYSICIAN ASSISTANT

## 2025-02-05 PROCEDURE — 3017F COLORECTAL CA SCREEN DOC REV: CPT | Performed by: PHYSICIAN ASSISTANT

## 2025-02-05 PROCEDURE — 3078F DIAST BP <80 MM HG: CPT | Performed by: PHYSICIAN ASSISTANT

## 2025-02-05 PROCEDURE — G8427 DOCREV CUR MEDS BY ELIG CLIN: HCPCS | Performed by: PHYSICIAN ASSISTANT

## 2025-02-05 PROCEDURE — 1123F ACP DISCUSS/DSCN MKR DOCD: CPT | Performed by: PHYSICIAN ASSISTANT

## 2025-02-05 PROCEDURE — 1036F TOBACCO NON-USER: CPT | Performed by: PHYSICIAN ASSISTANT

## 2025-02-05 RX ORDER — DEXAMETHASONE 6 MG/1
6 TABLET ORAL
Qty: 5 TABLET | Refills: 0 | Status: SHIPPED | OUTPATIENT
Start: 2025-02-05 | End: 2025-02-10

## 2025-02-05 RX ORDER — OSELTAMIVIR PHOSPHATE 75 MG/1
75 CAPSULE ORAL 2 TIMES DAILY
Qty: 10 CAPSULE | Refills: 0 | Status: SHIPPED | OUTPATIENT
Start: 2025-02-05 | End: 2025-02-10

## 2025-02-05 SDOH — ECONOMIC STABILITY: FOOD INSECURITY: WITHIN THE PAST 12 MONTHS, THE FOOD YOU BOUGHT JUST DIDN'T LAST AND YOU DIDN'T HAVE MONEY TO GET MORE.: NEVER TRUE

## 2025-02-05 SDOH — ECONOMIC STABILITY: FOOD INSECURITY: WITHIN THE PAST 12 MONTHS, YOU WORRIED THAT YOUR FOOD WOULD RUN OUT BEFORE YOU GOT MONEY TO BUY MORE.: NEVER TRUE

## 2025-02-05 ASSESSMENT — PATIENT HEALTH QUESTIONNAIRE - PHQ9
1. LITTLE INTEREST OR PLEASURE IN DOING THINGS: NOT AT ALL
SUM OF ALL RESPONSES TO PHQ QUESTIONS 1-9: 0
SUM OF ALL RESPONSES TO PHQ9 QUESTIONS 1 & 2: 0
2. FEELING DOWN, DEPRESSED OR HOPELESS: NOT AT ALL

## 2025-02-05 NOTE — PROGRESS NOTES
2/6/2025    Francisco Olvera    Chief Complaint   Patient presents with    Generalized Body Aches    Fever    Cough    Sweats    Influenza     Exposed to the virus via wife    all symptoms     Pt reports all symptoms started today, sudden onset     HPI  History was obtained from patient.   Francisco is a 74 y.o. male who presents today with complaints of sudden onset of fever, chills, body aches, cough, sore throat, and nasal congestion that started today.  Lives at home with wife who currently has influenza A.  He denies any shortness of breath, wheezing.  He denies any abdominal pain, nausea vomiting, diarrhea.  Good p.o. intake and urine output.  He needs a work note.      PAST MEDICAL HISTORY  Past Medical History:   Diagnosis Date    Abnormal CT scan     per pt \"did CT scan and found spot on spine( T 8 lesion) back in March( 2019) did bx but did not show anything and now ( 7/5/2019) going to try do another bx\"    BPH (benign prostatic hyperplasia)     CAD (coronary artery disease)     follows with Dr Leone    Chronic cough 2/18/2020    COPD (chronic obstructive pulmonary disease) ([facility]) 4/5/2019    follow with Dr Reyes    Coronary arteriosclerosis     Excessive daytime sleepiness 4/5/2019    Family history of coronary artery disease     Former smoker 8/17/2020    GERD (gastroesophageal reflux disease)     H/O cardiac catheterization 03-    ([facility]) Total occ. LAD w/mild disease of LM and RCA.     H/O cardiovascular stress test 02-    (Exercise) EF 58%. Normal. Exercise cap. 11.0 METS.    H/O cardiovascular stress test 05-    (Cardiolite) EF 63%. Good exercise capacity. Hypertensive blood pressure response tp exercise. ETT neg for ischemia or arrhythmia. Cardiolite perfusion imaging reveals Ant. wall soft tissue attenuation artifact and mild ischemia of Inf. wall.    H/O colonoscopy with polypectomy 02-    Polyp in Rectum    H/O Doppler ultrasound 03-    (Carotid) No anatomical

## 2025-03-18 RX ORDER — OMEPRAZOLE 40 MG/1
CAPSULE, DELAYED RELEASE ORAL
Qty: 30 CAPSULE | Refills: 5 | Status: SHIPPED | OUTPATIENT
Start: 2025-03-18

## 2025-03-25 ENCOUNTER — TELEMEDICINE (OUTPATIENT)
Dept: FAMILY MEDICINE CLINIC | Age: 75
End: 2025-03-25
Payer: MEDICARE

## 2025-03-25 DIAGNOSIS — Z00.00 MEDICARE ANNUAL WELLNESS VISIT, SUBSEQUENT: Primary | ICD-10-CM

## 2025-03-25 PROCEDURE — G0439 PPPS, SUBSEQ VISIT: HCPCS | Performed by: FAMILY MEDICINE

## 2025-03-25 PROCEDURE — 1159F MED LIST DOCD IN RCRD: CPT | Performed by: FAMILY MEDICINE

## 2025-03-25 PROCEDURE — 3017F COLORECTAL CA SCREEN DOC REV: CPT | Performed by: FAMILY MEDICINE

## 2025-03-25 PROCEDURE — 1123F ACP DISCUSS/DSCN MKR DOCD: CPT | Performed by: FAMILY MEDICINE

## 2025-03-25 ASSESSMENT — PATIENT HEALTH QUESTIONNAIRE - PHQ9
2. FEELING DOWN, DEPRESSED OR HOPELESS: NOT AT ALL
SUM OF ALL RESPONSES TO PHQ QUESTIONS 1-9: 0
1. LITTLE INTEREST OR PLEASURE IN DOING THINGS: NOT AT ALL
SUM OF ALL RESPONSES TO PHQ QUESTIONS 1-9: 0

## 2025-03-25 ASSESSMENT — LIFESTYLE VARIABLES
HOW OFTEN DO YOU HAVE A DRINK CONTAINING ALCOHOL: 2-3 TIMES A WEEK
HOW MANY STANDARD DRINKS CONTAINING ALCOHOL DO YOU HAVE ON A TYPICAL DAY: 1 OR 2

## 2025-03-25 NOTE — PROGRESS NOTES
Medicare Annual Wellness Visit    Francisco Olvera is here for Medicare AWV    Assessment & Plan   Medicare annual wellness visit, subsequent     No follow-ups on file.     Subjective       Patient's complete Health Risk Assessment and screening values have been reviewed and are found in Flowsheets. The following problems were reviewed today and where indicated follow up appointments were made and/or referrals ordered.    Positive Risk Factor Screenings with Interventions:    Fall Risk:  Do you feel unsteady or are you worried about falling? : no  2 or more falls in past year?: (!) yes  Fall with injury in past year?: no     Interventions:    Patient comments: patient states he does not worry about falling.  Reviewed medications, home hazards, visual acuity, and co-morbidities that can increase risk for falls  Patient declines any further evaluation or treatment               Abnormal BMI (obese):  There is no height or weight on file to calculate BMI. (!) Abnormal  Interventions:  Patient declines any further evaluation or treatment           Safety:  Do you have working smoke detectors?: (!) No (needs to buy new ones)  Interventions:  Patient comments: patient states he does need to buy new smoke alarms.  Patient declined any further interventions or treatment     Advanced Directives:  Do you have a Living Will?: (!) No    Intervention:  has NO advanced directive - information provided verbally. He states he has the forms, just needs to complete them.                     Objective    Patient-Reported Vitals  No data recorded     Unable to obtain 3 vital signs due to patient not having equipment to take blood pressure/temperature.              No Known Allergies  Prior to Visit Medications    Medication Sig Taking? Authorizing Provider   omeprazole (PRILOSEC) 40 MG delayed release capsule TAKE ONE (1) CAPSULE BY MOUTH ONCE DAILY Yes Eagle Kohli MD   atorvastatin (LIPITOR) 40 MG tablet Take 1 tablet by mouth

## 2025-03-25 NOTE — PATIENT INSTRUCTIONS
Personalized Preventive Plan for Francisco Olvera - 3/25/2025  Medicare offers a range of preventive health benefits. Some of the tests and screenings are paid in full while other may be subject to a deductible, co-insurance, and/or copay.  Some of these benefits include a comprehensive review of your medical history including lifestyle, illnesses that may run in your family, and various assessments and screenings as appropriate.  After reviewing your medical record and screening and assessments performed today your provider may have ordered immunizations, labs, imaging, and/or referrals for you.  A list of these orders (if applicable) as well as your Preventive Care list are included within your After Visit Summary for your review.

## 2025-04-15 ENCOUNTER — OFFICE VISIT (OUTPATIENT)
Dept: FAMILY MEDICINE CLINIC | Age: 75
End: 2025-04-15
Payer: MEDICARE

## 2025-04-15 ENCOUNTER — TELEPHONE (OUTPATIENT)
Dept: CARDIOLOGY CLINIC | Age: 75
End: 2025-04-15

## 2025-04-15 VITALS
SYSTOLIC BLOOD PRESSURE: 136 MMHG | TEMPERATURE: 97.2 F | DIASTOLIC BLOOD PRESSURE: 80 MMHG | BODY MASS INDEX: 31.1 KG/M2 | HEART RATE: 63 BPM | OXYGEN SATURATION: 97 % | WEIGHT: 192.7 LBS

## 2025-04-15 DIAGNOSIS — R73.9 HYPERGLYCEMIA: ICD-10-CM

## 2025-04-15 DIAGNOSIS — F41.9 ANXIETY: Primary | ICD-10-CM

## 2025-04-15 DIAGNOSIS — G25.81 RESTLESS LEG SYNDROME: ICD-10-CM

## 2025-04-15 DIAGNOSIS — I25.10 CORONARY ARTERY DISEASE INVOLVING NATIVE CORONARY ARTERY OF NATIVE HEART WITHOUT ANGINA PECTORIS: ICD-10-CM

## 2025-04-15 DIAGNOSIS — I11.0 HYPERTENSIVE HEART DISEASE WITH HEART FAILURE (HCC): ICD-10-CM

## 2025-04-15 DIAGNOSIS — R07.9 CHEST PAIN, UNSPECIFIED TYPE: ICD-10-CM

## 2025-04-15 DIAGNOSIS — I10 PRIMARY HYPERTENSION: ICD-10-CM

## 2025-04-15 DIAGNOSIS — Z12.5 PROSTATE CANCER SCREENING: ICD-10-CM

## 2025-04-15 DIAGNOSIS — J42 CHRONIC BRONCHITIS, UNSPECIFIED CHRONIC BRONCHITIS TYPE (HCC): ICD-10-CM

## 2025-04-15 PROCEDURE — 1036F TOBACCO NON-USER: CPT | Performed by: FAMILY MEDICINE

## 2025-04-15 PROCEDURE — 3017F COLORECTAL CA SCREEN DOC REV: CPT | Performed by: FAMILY MEDICINE

## 2025-04-15 PROCEDURE — 3023F SPIROM DOC REV: CPT | Performed by: FAMILY MEDICINE

## 2025-04-15 PROCEDURE — 1159F MED LIST DOCD IN RCRD: CPT | Performed by: FAMILY MEDICINE

## 2025-04-15 PROCEDURE — 3079F DIAST BP 80-89 MM HG: CPT | Performed by: FAMILY MEDICINE

## 2025-04-15 PROCEDURE — G8427 DOCREV CUR MEDS BY ELIG CLIN: HCPCS | Performed by: FAMILY MEDICINE

## 2025-04-15 PROCEDURE — 99214 OFFICE O/P EST MOD 30 MIN: CPT | Performed by: FAMILY MEDICINE

## 2025-04-15 PROCEDURE — G8417 CALC BMI ABV UP PARAM F/U: HCPCS | Performed by: FAMILY MEDICINE

## 2025-04-15 PROCEDURE — 1123F ACP DISCUSS/DSCN MKR DOCD: CPT | Performed by: FAMILY MEDICINE

## 2025-04-15 PROCEDURE — 3075F SYST BP GE 130 - 139MM HG: CPT | Performed by: FAMILY MEDICINE

## 2025-04-15 RX ORDER — AMLODIPINE BESYLATE 2.5 MG/1
2.5 TABLET ORAL DAILY
Qty: 90 TABLET | Refills: 1 | Status: SHIPPED | OUTPATIENT
Start: 2025-04-15

## 2025-04-15 RX ORDER — PRAMIPEXOLE DIHYDROCHLORIDE 0.25 MG/1
0.25 TABLET ORAL 2 TIMES DAILY
Qty: 60 TABLET | Refills: 5 | Status: SHIPPED | OUTPATIENT
Start: 2025-04-15

## 2025-04-15 RX ORDER — ATORVASTATIN CALCIUM 40 MG/1
40 TABLET, FILM COATED ORAL DAILY
Qty: 90 TABLET | Refills: 0 | Status: SHIPPED | OUTPATIENT
Start: 2025-04-15

## 2025-04-15 RX ORDER — NITROGLYCERIN 0.4 MG/1
0.4 TABLET SUBLINGUAL EVERY 5 MIN PRN
Qty: 25 TABLET | Refills: 3 | Status: SHIPPED | OUTPATIENT
Start: 2025-04-15

## 2025-04-15 ASSESSMENT — ENCOUNTER SYMPTOMS
NAUSEA: 0
BLOOD IN STOOL: 0
TROUBLE SWALLOWING: 0
CHEST TIGHTNESS: 0
VOMITING: 0
DIARRHEA: 0
WHEEZING: 0
EYE PAIN: 0
SHORTNESS OF BREATH: 1
ABDOMINAL PAIN: 0

## 2025-04-15 NOTE — PROGRESS NOTES
4/15/2025    Francisco Olvera    Chief Complaint   Patient presents with    6 Month Follow-Up       HPI  History was obtained from the patient.  Francisco is a 74 y.o. male who presents today with recheck.  He has history of coronary disease with previous bypass grafting, hypertension, COPD, anxiety, restless leg syndrome, and history of recent epistaxis.  Trend is getting better he has any further episodes I advised the see ENT for and do endoscopic nasal exam.  Has had a couple episodes of shortness of breath and atypical chest pressure over may be chest pain-will be seeing cardiology in 2 days.  Last stress test was about a year ago.  Chest pain is not necessarily exertional.  Discomfort is increased with deep breathing at times.  No real chest wall tenderness noted.  On review he is not taking nitroglycerin-I instructed him to get some fresh nitroglycerin and take it sit down and see if that makes a difference in this discomfort if present.  This would be both therapeutic and diagnostic before he sees cardiology if symptoms become markedly worse and this does not help she is to go to the emergency room.  He is due for lab work and refills also..    REVIEW OF SYMPTOMS    Review of Systems   Constitutional:  Negative for activity change and fatigue.   HENT:  Negative for congestion, hearing loss, mouth sores and trouble swallowing.    Eyes:  Negative for pain and visual disturbance.   Respiratory:  Positive for shortness of breath. Negative for chest tightness and wheezing.    Cardiovascular:  Positive for chest pain. Negative for palpitations.        Patient with history of coronary disease as well as hypertension.   Gastrointestinal:  Negative for abdominal pain, blood in stool, diarrhea, nausea and vomiting.   Endocrine: Negative for polydipsia and polyuria.   Genitourinary:  Negative for dysuria, frequency and urgency.   Musculoskeletal:  Negative for arthralgias, gait problem and neck stiffness.   Skin:  Negative for

## 2025-04-16 DIAGNOSIS — I11.0 HYPERTENSIVE HEART DISEASE WITH HEART FAILURE (HCC): ICD-10-CM

## 2025-04-16 DIAGNOSIS — I25.10 CORONARY ARTERY DISEASE INVOLVING NATIVE CORONARY ARTERY OF NATIVE HEART WITHOUT ANGINA PECTORIS: ICD-10-CM

## 2025-04-16 DIAGNOSIS — Z12.5 PROSTATE CANCER SCREENING: ICD-10-CM

## 2025-04-16 DIAGNOSIS — R73.9 HYPERGLYCEMIA: ICD-10-CM

## 2025-04-16 LAB
ALBUMIN SERPL-MCNC: 4.3 G/DL (ref 3.4–5)
ALBUMIN/GLOB SERPL: 1.6 {RATIO} (ref 1.1–2.2)
ALP SERPL-CCNC: 68 U/L (ref 40–129)
ALT SERPL-CCNC: 41 U/L (ref 10–40)
ANION GAP SERPL CALCULATED.3IONS-SCNC: 11 MMOL/L (ref 3–16)
AST SERPL-CCNC: 29 U/L (ref 15–37)
BILIRUB SERPL-MCNC: 0.8 MG/DL (ref 0–1)
BUN SERPL-MCNC: 18 MG/DL (ref 7–20)
CALCIUM SERPL-MCNC: 9.3 MG/DL (ref 8.3–10.6)
CHLORIDE SERPL-SCNC: 105 MMOL/L (ref 99–110)
CHOLEST SERPL-MCNC: 120 MG/DL (ref 0–199)
CO2 SERPL-SCNC: 25 MMOL/L (ref 21–32)
CREAT SERPL-MCNC: 0.9 MG/DL (ref 0.8–1.3)
DEPRECATED RDW RBC AUTO: 13.5 % (ref 12.4–15.4)
GFR SERPLBLD CREATININE-BSD FMLA CKD-EPI: 89 ML/MIN/{1.73_M2}
GLUCOSE SERPL-MCNC: 112 MG/DL (ref 70–99)
HCT VFR BLD AUTO: 47.6 % (ref 40.5–52.5)
HDLC SERPL-MCNC: 45 MG/DL (ref 40–60)
HGB BLD-MCNC: 16 G/DL (ref 13.5–17.5)
LDLC SERPL CALC-MCNC: 58 MG/DL
MCH RBC QN AUTO: 28.9 PG (ref 26–34)
MCHC RBC AUTO-ENTMCNC: 33.7 G/DL (ref 31–36)
MCV RBC AUTO: 85.9 FL (ref 80–100)
PLATELET # BLD AUTO: 204 K/UL (ref 135–450)
PMV BLD AUTO: 9.6 FL (ref 5–10.5)
POTASSIUM SERPL-SCNC: 4.4 MMOL/L (ref 3.5–5.1)
PROT SERPL-MCNC: 7 G/DL (ref 6.4–8.2)
PSA SERPL DL<=0.01 NG/ML-MCNC: 1.67 NG/ML (ref 0–4)
RBC # BLD AUTO: 5.54 M/UL (ref 4.2–5.9)
SODIUM SERPL-SCNC: 141 MMOL/L (ref 136–145)
TRIGL SERPL-MCNC: 84 MG/DL (ref 0–150)
VLDLC SERPL CALC-MCNC: 17 MG/DL
WBC # BLD AUTO: 7.4 K/UL (ref 4–11)

## 2025-04-16 NOTE — PROGRESS NOTES
Patient Name: Francisco Olvera  : 1950  MRN# [id number]    REASON FOR VISIT: 6 month follow  Patient Active Problem List    Diagnosis Date Noted    Hypertensive heart disease with heart failure (HCC) 04/15/2025    Memory change 2021    Benign neoplasm of transverse colon     Hyperglycemia 2021    Former smoker 2020    Restless leg syndrome 07/15/2020    Abnormal findings on diagnostic imaging of musculoskeletal system 2020    Chronic cough 2020    Tumor T8 (hyaline cartilage) 2019    Anxiety 2019    Periodic limb movements of sleep 2019    Excessive daytime sleepiness 2019    COPD (chronic obstructive pulmonary disease) (HCC) 2019    Dyspnea     Community acquired pneumonia 2019    Iron deficiency anemia due to chronic blood loss 2016    Family history of coronary artery disease     HTN (hypertension)     CAD (coronary artery disease)     Hyperlipidemia     GERD (gastroesophageal reflux disease)     S/P CABG x 1        LABS:  Lab Results   Component Value Date    CHOL 118 2024    TRIG 102 2024    HDL 48 2024    LDLDIRECT 67 2012    TSH 2.3 10/14/2016     Hemoglobin A1C   Date Value Ref Range Status   2024 6.2 See comment % Final     Comment:     Comment:  Diagnosis of Diabetes: > or = 6.5%  Increased risk of diabetes (Prediabetes): 5.7-6.4%  Glycemic Control: Nonpregnant Adults: <7.0%                    Pregnant: <6.0%

## 2025-04-17 ENCOUNTER — OFFICE VISIT (OUTPATIENT)
Dept: CARDIOLOGY CLINIC | Age: 75
End: 2025-04-17
Payer: MEDICARE

## 2025-04-17 VITALS
HEART RATE: 65 BPM | SYSTOLIC BLOOD PRESSURE: 138 MMHG | BODY MASS INDEX: 31.18 KG/M2 | HEIGHT: 66 IN | DIASTOLIC BLOOD PRESSURE: 68 MMHG | WEIGHT: 194 LBS

## 2025-04-17 DIAGNOSIS — Z87.891 FORMER SMOKER: ICD-10-CM

## 2025-04-17 DIAGNOSIS — E78.5 HYPERLIPIDEMIA, UNSPECIFIED HYPERLIPIDEMIA TYPE: ICD-10-CM

## 2025-04-17 DIAGNOSIS — I10 PRIMARY HYPERTENSION: ICD-10-CM

## 2025-04-17 DIAGNOSIS — I11.0 HYPERTENSIVE HEART DISEASE WITH HEART FAILURE (HCC): ICD-10-CM

## 2025-04-17 DIAGNOSIS — I25.10 CORONARY ARTERY DISEASE INVOLVING NATIVE CORONARY ARTERY OF NATIVE HEART WITHOUT ANGINA PECTORIS: Primary | ICD-10-CM

## 2025-04-17 DIAGNOSIS — Z95.1 S/P CABG X 1: ICD-10-CM

## 2025-04-17 LAB
EST. AVERAGE GLUCOSE BLD GHB EST-MCNC: 148.5 MG/DL
HBA1C MFR BLD: 6.8 %

## 2025-04-17 PROCEDURE — G8427 DOCREV CUR MEDS BY ELIG CLIN: HCPCS | Performed by: INTERNAL MEDICINE

## 2025-04-17 PROCEDURE — 3017F COLORECTAL CA SCREEN DOC REV: CPT | Performed by: INTERNAL MEDICINE

## 2025-04-17 PROCEDURE — 3075F SYST BP GE 130 - 139MM HG: CPT | Performed by: INTERNAL MEDICINE

## 2025-04-17 PROCEDURE — 1159F MED LIST DOCD IN RCRD: CPT | Performed by: INTERNAL MEDICINE

## 2025-04-17 PROCEDURE — G8417 CALC BMI ABV UP PARAM F/U: HCPCS | Performed by: INTERNAL MEDICINE

## 2025-04-17 PROCEDURE — 99214 OFFICE O/P EST MOD 30 MIN: CPT | Performed by: INTERNAL MEDICINE

## 2025-04-17 PROCEDURE — 93000 ELECTROCARDIOGRAM COMPLETE: CPT | Performed by: INTERNAL MEDICINE

## 2025-04-17 PROCEDURE — 1160F RVW MEDS BY RX/DR IN RCRD: CPT | Performed by: INTERNAL MEDICINE

## 2025-04-17 PROCEDURE — 1123F ACP DISCUSS/DSCN MKR DOCD: CPT | Performed by: INTERNAL MEDICINE

## 2025-04-17 PROCEDURE — 3078F DIAST BP <80 MM HG: CPT | Performed by: INTERNAL MEDICINE

## 2025-04-17 PROCEDURE — 1036F TOBACCO NON-USER: CPT | Performed by: INTERNAL MEDICINE

## 2025-04-17 RX ORDER — EZETIMIBE 10 MG/1
10 TABLET ORAL DAILY
Qty: 90 TABLET | Refills: 1 | Status: SHIPPED | OUTPATIENT
Start: 2025-04-17

## 2025-04-17 RX ORDER — AMLODIPINE BESYLATE 5 MG/1
5 TABLET ORAL DAILY
Qty: 90 TABLET | Refills: 1 | Status: SHIPPED | OUTPATIENT
Start: 2025-04-17

## 2025-04-17 NOTE — PATIENT INSTRUCTIONS
Thank you for allowing us to care for you today!   We want to ensure we can follow your treatment plan and we strive to give you the best outcomes and experience possible.   If you ever have a life threatening emergency and call 911 - for an ambulance (EMS)  REMEMBER  Our providers can only care for you at:   [facility] or [facility]   Even if you have someone take you or you drive yourself we can only care for you in a [facility] facility. Our providers are not setup at the other healthcare locations!    PLEASE CALL OUR OFFICE DURING NORMAL BUSINESS HOURS  Monday through Friday 8 am to 5 pm  AFTER HOURS the physician on-call cannot help with scheduling, rescheduling, procedure instruction questions or any type of medication need or issue.  [facility] P:[phone] - [facility] P:[phone] - [facility] P:[phone]      If you receive a survey:  We would appreciate you taking the time to share your experience concerning your provider visit in the office.    These surveys are confidential!  We are eager to improve and are counting on you to share your feedback so we can ensure you get the best care possible.  CORONARY ARTERY DISEASE:Yes, had CABG with .   CORONARY ARTERY BYPASS GRAFT   03-     x 1 - LIMA to LAD - Dr. Burks       clinically stable. Patient is on optimal medical regimen ( see medication list above )  - Patient is currently  asymptomatic from CAD.           - changes in  treatment:   no, on ASA           - Testing ordered: yes  [place] classification: 1   2/16/2022    Normal study.    Good exercise capacity. 10 METs work load.    Physiological BP response to exercise.    Normal perfusion study with normal distribution in all coronal, short, and    horizontal axis.    The observed defect is consistent with diaphragmatic attenuation.    Normal LV function. LVEF is 58 %.      EKG: NSR 65/min, first degree AV Block

## 2025-04-17 NOTE — PROGRESS NOTES
OFFICE PROGRESS NOTES      Francisco is a 74 y.o. male who has    CHIEF COMPLAINT AS FOLLOWS:  CHEST PAIN:  Patient continues to C/O chest discomfort without aggravating or relieving factors.      SOB:  C/O SOB with exertion. Has COPD.                LEG EDEMA: No leg edema   PALPITATIONS: Denies any C/O Palpitations   DIZZINESS: No C/O Dizziness   SYNCOPE: None   OTHER/ ADDITIONAL COMPLAINTS:                                     HPI: Patient is here for F/U on his CAD, HTN & Dyslipidemia problems.   CAD: Patient has known CAD. Had CABG in the past.  HTN: Patient has known essential HTN. Has been treated with guideline recommended medical / physical/ diet therapy as stated below.  Dyslipidemia: Patient has known mixed dyslipidemia. Has been treated with guideline recommended medical / physical/ diet therapy as stated below.                Current Outpatient Medications   Medication Sig Dispense Refill    amLODIPine (NORVASC) 5 MG tablet Take 1 tablet by mouth daily 90 tablet 1    ezetimibe (ZETIA) 10 MG tablet Take 1 tablet by mouth daily 90 tablet 1    amLODIPine (NORVASC) 2.5 MG tablet Take 1 tablet by mouth daily 90 tablet 1    sertraline (ZOLOFT) 50 MG tablet TAKE ONE (1) TABLET BY MOUTH  ONCE DAILY 30 tablet 5    pramipexole (MIRAPEX) 0.25 MG tablet Take 1 tablet by mouth in the morning and 1 tablet in the evening. 60 tablet 5    atorvastatin (LIPITOR) 40 MG tablet Take 1 tablet by mouth daily 90 tablet 0    nitroGLYCERIN (NITROSTAT) 0.4 MG SL tablet Place 1 tablet under the tongue every 5 minutes as needed for Chest pain up to max of 3 total doses. If no relief after 1 dose, call 911. 25 tablet 3    omeprazole (PRILOSEC) 40 MG delayed release capsule TAKE ONE (1) CAPSULE BY MOUTH ONCE DAILY 30 capsule 5    tamsulosin (FLOMAX) 0.4 MG capsule Take 1 capsule by mouth daily 90 capsule 1    Multiple Vitamin (MULTIVITAMIN ADULT PO) Take by mouth      aspirin 81 MG EC tablet Take 1 tablet by mouth daily       No current

## 2025-04-17 NOTE — PROGRESS NOTES
CLINICAL STAFF DOCUMENTATION    Dr. Reymundo Mcclendon     Francisco Mccallumfabby  1950  [id number]    Have you had any Chest Pain recently? - Yes  If Yes DO EKG   When did the pain begin? - Weeks   What type of pain is it? -  Tightness and Heaviness    Tender to palpate (touch)? Yes  Does the pain radiate or stay in one place? -  stays in center of chest.   How long does the pain last? - 1 hours    How Severe is the pain? - 4  Is there anything that aggravates or triggers the pain?  No  Did you take any medication?  No    Is there anything that relieves it?  No  Do you have any other symptoms at the same time? SOB            Have you had any Shortness of Breath - No        Have you had any dizziness - No        Have you had any palpitations recently? - No        Do you have any edema - swelling in No          When did you have your last labs drawn 4/16  What doctor ordered Kohli  Do we have the labs in their chart Yes      Do you need any prescriptions refilled? - Yes    Do you have a surgery or procedure scheduled in the near future - No      Do use tobacco products? - No  Do you drink alcohol? - Yes  Do you use any illicit drugs? - No  Caffeine? - Yes  How much caffeine? .1  cups daily.

## 2025-04-18 ENCOUNTER — RESULTS FOLLOW-UP (OUTPATIENT)
Dept: FAMILY MEDICINE CLINIC | Age: 75
End: 2025-04-18

## 2025-04-18 DIAGNOSIS — E11.9 TYPE 2 DIABETES MELLITUS WITHOUT COMPLICATION, WITHOUT LONG-TERM CURRENT USE OF INSULIN (HCC): Primary | ICD-10-CM

## 2025-04-25 ENCOUNTER — TELEPHONE (OUTPATIENT)
Dept: FAMILY MEDICINE CLINIC | Age: 75
End: 2025-04-25

## 2025-04-28 RX ORDER — IBUPROFEN 800 MG/1
800 TABLET, FILM COATED ORAL 2 TIMES DAILY PRN
Qty: 60 TABLET | Refills: 1 | Status: SHIPPED | OUTPATIENT
Start: 2025-04-28

## 2025-04-28 NOTE — TELEPHONE ENCOUNTER
Spoke with pt per provider note. Pt agreed & voiced understanding    Requested Prescriptions     Signed Prescriptions Disp Refills    ibuprofen (ADVIL;MOTRIN) 800 MG tablet 60 tablet 1     Sig: Take 1 tablet by mouth 2 times daily as needed for Pain     Authorizing Provider: AJITH CARTY     Ordering User: SARA HIGGINS

## 2025-04-29 RX ORDER — ATORVASTATIN CALCIUM 40 MG/1
40 TABLET, FILM COATED ORAL DAILY
Qty: 90 TABLET | Refills: 0 | OUTPATIENT
Start: 2025-04-29

## 2025-06-23 DIAGNOSIS — N40.0 BENIGN PROSTATIC HYPERPLASIA WITHOUT LOWER URINARY TRACT SYMPTOMS: ICD-10-CM

## 2025-06-23 DIAGNOSIS — N20.0 NEPHROLITHIASIS: ICD-10-CM

## 2025-06-24 RX ORDER — TAMSULOSIN HYDROCHLORIDE 0.4 MG/1
0.4 CAPSULE ORAL DAILY
Qty: 90 CAPSULE | Refills: 1 | Status: SHIPPED | OUTPATIENT
Start: 2025-06-24

## 2025-07-15 RX ORDER — ATORVASTATIN CALCIUM 40 MG/1
40 TABLET, FILM COATED ORAL DAILY
Qty: 90 TABLET | Refills: 0 | Status: SHIPPED | OUTPATIENT
Start: 2025-07-15

## 2025-07-21 DIAGNOSIS — E11.9 TYPE 2 DIABETES MELLITUS WITHOUT COMPLICATION, WITHOUT LONG-TERM CURRENT USE OF INSULIN (HCC): ICD-10-CM

## 2025-07-22 LAB
ALBUMIN SERPL-MCNC: 4.2 G/DL (ref 3.4–5)
ALBUMIN/GLOB SERPL: 1.7 {RATIO} (ref 1.1–2.2)
ALP SERPL-CCNC: 61 U/L (ref 40–129)
ALT SERPL-CCNC: 39 U/L (ref 10–40)
ANION GAP SERPL CALCULATED.3IONS-SCNC: 10 MMOL/L (ref 3–16)
AST SERPL-CCNC: 26 U/L (ref 15–37)
BILIRUB SERPL-MCNC: 0.9 MG/DL (ref 0–1)
BUN SERPL-MCNC: 19 MG/DL (ref 7–20)
CALCIUM SERPL-MCNC: 9.5 MG/DL (ref 8.3–10.6)
CHLORIDE SERPL-SCNC: 106 MMOL/L (ref 99–110)
CO2 SERPL-SCNC: 24 MMOL/L (ref 21–32)
CREAT SERPL-MCNC: 0.9 MG/DL (ref 0.8–1.3)
EST. AVERAGE GLUCOSE BLD GHB EST-MCNC: 128.4 MG/DL
GFR SERPLBLD CREATININE-BSD FMLA CKD-EPI: 89 ML/MIN/{1.73_M2}
GLUCOSE SERPL-MCNC: 106 MG/DL (ref 70–99)
HBA1C MFR BLD: 6.1 %
POTASSIUM SERPL-SCNC: 4.2 MMOL/L (ref 3.5–5.1)
PROT SERPL-MCNC: 6.7 G/DL (ref 6.4–8.2)
SODIUM SERPL-SCNC: 140 MMOL/L (ref 136–145)

## 2025-08-06 ENCOUNTER — OFFICE VISIT (OUTPATIENT)
Dept: FAMILY MEDICINE CLINIC | Age: 75
End: 2025-08-06

## 2025-08-06 VITALS
HEART RATE: 56 BPM | OXYGEN SATURATION: 95 % | BODY MASS INDEX: 30.53 KG/M2 | WEIGHT: 190 LBS | HEIGHT: 66 IN | DIASTOLIC BLOOD PRESSURE: 74 MMHG | SYSTOLIC BLOOD PRESSURE: 138 MMHG

## 2025-08-06 DIAGNOSIS — R60.9 EDEMA, UNSPECIFIED TYPE: ICD-10-CM

## 2025-08-06 DIAGNOSIS — M25.562 PAIN AND SWELLING OF LEFT KNEE: Primary | ICD-10-CM

## 2025-08-06 DIAGNOSIS — M25.462 PAIN AND SWELLING OF LEFT KNEE: Primary | ICD-10-CM

## 2025-08-06 DIAGNOSIS — M79.652 PAIN IN LEFT THIGH: ICD-10-CM

## 2025-08-06 ASSESSMENT — ENCOUNTER SYMPTOMS: SHORTNESS OF BREATH: 0

## 2025-08-08 ENCOUNTER — HOSPITAL ENCOUNTER (OUTPATIENT)
Dept: ULTRASOUND IMAGING | Age: 75
Discharge: HOME | End: 2025-08-08
Payer: MEDICARE

## 2025-08-08 DIAGNOSIS — R60.9 EDEMA, UNSPECIFIED TYPE: ICD-10-CM

## 2025-08-08 DIAGNOSIS — M79.652 PAIN IN LEFT THIGH: ICD-10-CM

## 2025-08-08 PROCEDURE — 93971 EXTREMITY STUDY: CPT

## 2025-08-13 ENCOUNTER — OFFICE VISIT (OUTPATIENT)
Dept: FAMILY MEDICINE CLINIC | Age: 75
End: 2025-08-13

## 2025-08-13 VITALS
SYSTOLIC BLOOD PRESSURE: 126 MMHG | WEIGHT: 185 LBS | BODY MASS INDEX: 29.73 KG/M2 | OXYGEN SATURATION: 94 % | HEART RATE: 56 BPM | HEIGHT: 66 IN | DIASTOLIC BLOOD PRESSURE: 72 MMHG

## 2025-08-13 DIAGNOSIS — R21 RASH: ICD-10-CM

## 2025-08-13 DIAGNOSIS — M79.89 SWELLING OF THIGH: Primary | ICD-10-CM

## 2025-08-13 DIAGNOSIS — E11.9 TYPE 2 DIABETES MELLITUS WITHOUT COMPLICATION, WITHOUT LONG-TERM CURRENT USE OF INSULIN (HCC): ICD-10-CM

## 2025-08-13 RX ORDER — TRIAMCINOLONE ACETONIDE 1 MG/G
CREAM TOPICAL
Qty: 30 G | Refills: 1 | Status: SHIPPED | OUTPATIENT
Start: 2025-08-13

## 2025-08-14 ASSESSMENT — ENCOUNTER SYMPTOMS
VOMITING: 0
TROUBLE SWALLOWING: 0
DIARRHEA: 0
SHORTNESS OF BREATH: 0
NAUSEA: 0
BLOOD IN STOOL: 0
ABDOMINAL PAIN: 0
WHEEZING: 0
EYE PAIN: 0
CHEST TIGHTNESS: 0

## 2025-08-25 ENCOUNTER — HOSPITAL ENCOUNTER (OUTPATIENT)
Age: 75
Setting detail: SPECIMEN
Discharge: HOME | End: 2025-08-25
Payer: MEDICARE

## 2025-08-25 ENCOUNTER — OFFICE VISIT (OUTPATIENT)
Dept: ORTHOPEDIC SURGERY | Age: 75
End: 2025-08-25

## 2025-08-25 VITALS — RESPIRATION RATE: 15 BRPM | BODY MASS INDEX: 29.86 KG/M2 | HEART RATE: 57 BPM | OXYGEN SATURATION: 97 % | HEIGHT: 66 IN

## 2025-08-25 DIAGNOSIS — M70.42 PREPATELLAR BURSITIS OF LEFT KNEE: Primary | ICD-10-CM

## 2025-08-25 LAB
APPEARANCE: ABNORMAL
BODY FLD TYPE: ABNORMAL
CLOT CHECK: ABNORMAL
COLOR FLUID: ABNORMAL
CRYSTALS FLD MICRO: NORMAL
MONO/MACROPHAGE FLUID: 69 %
NEUTROPHIL, FLUID: 31 %
RBC, SYNOVIAL FLUID: ABNORMAL CELLS/UL
SPECIMEN TYPE: NORMAL
WBC COUNT, SYNOVIAL FLUID: 986 CELLS/UL

## 2025-08-25 PROCEDURE — 87075 CULTR BACTERIA EXCEPT BLOOD: CPT

## 2025-08-25 PROCEDURE — 87205 SMEAR GRAM STAIN: CPT

## 2025-08-25 PROCEDURE — 89051 BODY FLUID CELL COUNT: CPT

## 2025-08-25 PROCEDURE — 87070 CULTURE OTHR SPECIMN AEROBIC: CPT

## 2025-08-25 PROCEDURE — 89060 EXAM SYNOVIAL FLUID CRYSTALS: CPT

## 2025-08-25 RX ORDER — CEPHALEXIN 500 MG/1
500 CAPSULE ORAL 2 TIMES DAILY
Qty: 20 CAPSULE | Refills: 0 | Status: SHIPPED | OUTPATIENT
Start: 2025-08-25 | End: 2025-09-04

## 2025-08-30 LAB
MICROORGANISM SPEC CULT: NORMAL
MICROORGANISM/AGENT SPEC: NORMAL
SPECIMEN DESCRIPTION: NORMAL

## 2025-09-01 LAB
MICROORGANISM SPEC CULT: NORMAL
MICROORGANISM/AGENT SPEC: NORMAL
SPECIMEN DESCRIPTION: NORMAL

## (undated) DEVICE — LINE SAMP O2 6.5FT W/FEMALE CONN F/ADULT CAPNOLINE PLUS

## (undated) DEVICE — LINER SUCT CANSTR 1500CC SEMI RIG W/ POR HYDROPHOBIC SHUT

## (undated) DEVICE — SNARE ENDOSCP L240CM SHTH DIA24MM LOOP W10MM POLYP RND REINF

## (undated) DEVICE — JELLY LUBRICATING 3 GM BACTERIOSTATIC

## (undated) DEVICE — THE TORRENT IRRIGATION SCOPE CONNECTOR IS USED WITH THE TORRENT IRRIGATION TUBING TO PROVIDE IRRIGATION FLUIDS SUCH AS STERILE WATER DURING GASTROINTESTINAL ENDOSCOPIC PROCEDURES WHEN USED IN CONJUNCTION WITH AN IRRIGATION PUMP (OR ELECTROSURGICAL UNIT).: Brand: TORRENT

## (undated) DEVICE — TUBING, SUCTION, 3/16" X 6', STRAIGHT: Brand: MEDLINE

## (undated) DEVICE — KENDALL 500 SERIES DIAPHORETIC FOAM MONITORING ELECTRODE - TEAR DROP SHAPE ( 30/PK): Brand: KENDALL

## (undated) DEVICE — FORCEPS BX 240CM JAW 3.2MM L CAP NDL MIC MESH TTH M00513372

## (undated) DEVICE — BW-412T DISP COMBO CLEANING BRUSH: Brand: SINGLE USE COMBINATION CLEANING BRUSH